# Patient Record
Sex: FEMALE | Race: WHITE | NOT HISPANIC OR LATINO | Employment: FULL TIME | URBAN - METROPOLITAN AREA
[De-identification: names, ages, dates, MRNs, and addresses within clinical notes are randomized per-mention and may not be internally consistent; named-entity substitution may affect disease eponyms.]

---

## 2017-01-06 ENCOUNTER — OFFICE VISIT (OUTPATIENT)
Dept: URGENT CARE | Facility: CLINIC | Age: 37
End: 2017-01-06
Payer: COMMERCIAL

## 2017-01-06 PROCEDURE — G0382 LEV 3 HOSP TYPE B ED VISIT: HCPCS

## 2017-07-14 ENCOUNTER — ALLSCRIPTS OFFICE VISIT (OUTPATIENT)
Dept: OTHER | Facility: OTHER | Age: 37
End: 2017-07-14

## 2017-07-24 ENCOUNTER — ALLSCRIPTS OFFICE VISIT (OUTPATIENT)
Dept: OTHER | Facility: OTHER | Age: 37
End: 2017-07-24

## 2017-07-24 PROCEDURE — 87624 HPV HI-RISK TYP POOLED RSLT: CPT | Performed by: OBSTETRICS & GYNECOLOGY

## 2017-07-24 PROCEDURE — G0145 SCR C/V CYTO,THINLAYER,RESCR: HCPCS | Performed by: OBSTETRICS & GYNECOLOGY

## 2017-07-25 ENCOUNTER — LAB REQUISITION (OUTPATIENT)
Dept: LAB | Facility: HOSPITAL | Age: 37
End: 2017-07-25
Payer: COMMERCIAL

## 2017-07-25 DIAGNOSIS — Z11.51 ENCOUNTER FOR SCREENING FOR HUMAN PAPILLOMAVIRUS (HPV): ICD-10-CM

## 2017-07-25 DIAGNOSIS — Z12.4 ENCOUNTER FOR SCREENING FOR MALIGNANT NEOPLASM OF CERVIX: ICD-10-CM

## 2017-07-27 LAB — HPV RRNA GENITAL QL NAA+PROBE: ABNORMAL

## 2017-08-01 LAB
LAB AP GYN PRIMARY INTERPRETATION: NORMAL
Lab: NORMAL
PATH INTERP SPEC-IMP: NORMAL

## 2017-08-23 ENCOUNTER — ALLSCRIPTS OFFICE VISIT (OUTPATIENT)
Dept: OTHER | Facility: OTHER | Age: 37
End: 2017-08-23

## 2017-08-23 PROCEDURE — 88305 TISSUE EXAM BY PATHOLOGIST: CPT | Performed by: OBSTETRICS & GYNECOLOGY

## 2017-08-23 PROCEDURE — 88341 IMHCHEM/IMCYTCHM EA ADD ANTB: CPT | Performed by: OBSTETRICS & GYNECOLOGY

## 2017-08-23 PROCEDURE — 88342 IMHCHEM/IMCYTCHM 1ST ANTB: CPT | Performed by: OBSTETRICS & GYNECOLOGY

## 2017-08-24 ENCOUNTER — LAB REQUISITION (OUTPATIENT)
Dept: LAB | Facility: HOSPITAL | Age: 37
End: 2017-08-24
Payer: COMMERCIAL

## 2017-08-24 DIAGNOSIS — R87.610 ATYPICAL SQUAMOUS CELLS OF UNDETERMINED SIGNIFICANCE ON CYTOLOGIC SMEAR OF CERVIX (ASC-US): ICD-10-CM

## 2017-09-01 ENCOUNTER — GENERIC CONVERSION - ENCOUNTER (OUTPATIENT)
Dept: OTHER | Facility: OTHER | Age: 37
End: 2017-09-01

## 2017-09-06 ENCOUNTER — LAB REQUISITION (OUTPATIENT)
Dept: LAB | Facility: HOSPITAL | Age: 37
End: 2017-09-06
Payer: COMMERCIAL

## 2017-09-06 ENCOUNTER — GENERIC CONVERSION - ENCOUNTER (OUTPATIENT)
Dept: OTHER | Facility: OTHER | Age: 37
End: 2017-09-06

## 2017-09-06 DIAGNOSIS — R87.610 ATYPICAL SQUAMOUS CELLS OF UNDETERMINED SIGNIFICANCE ON CYTOLOGIC SMEAR OF CERVIX (ASC-US): ICD-10-CM

## 2017-09-06 PROCEDURE — 88344 IMHCHEM/IMCYTCHM EA MLT ANTB: CPT | Performed by: OBSTETRICS & GYNECOLOGY

## 2017-09-06 PROCEDURE — 88307 TISSUE EXAM BY PATHOLOGIST: CPT | Performed by: OBSTETRICS & GYNECOLOGY

## 2017-09-20 ENCOUNTER — GENERIC CONVERSION - ENCOUNTER (OUTPATIENT)
Dept: OTHER | Facility: OTHER | Age: 37
End: 2017-09-20

## 2017-10-02 ENCOUNTER — GENERIC CONVERSION - ENCOUNTER (OUTPATIENT)
Dept: OTHER | Facility: OTHER | Age: 37
End: 2017-10-02

## 2017-10-02 DIAGNOSIS — N92.6 IRREGULAR MENSTRUATION: ICD-10-CM

## 2017-10-07 ENCOUNTER — LAB CONVERSION - ENCOUNTER (OUTPATIENT)
Dept: OTHER | Facility: OTHER | Age: 37
End: 2017-10-07

## 2017-10-07 LAB — TSH SERPL DL<=0.05 MIU/L-ACNC: 1.28 MIU/L

## 2017-10-23 ENCOUNTER — LAB REQUISITION (OUTPATIENT)
Dept: LAB | Facility: HOSPITAL | Age: 37
End: 2017-10-23
Payer: COMMERCIAL

## 2017-10-23 ENCOUNTER — GENERIC CONVERSION - ENCOUNTER (OUTPATIENT)
Dept: OTHER | Facility: OTHER | Age: 37
End: 2017-10-23

## 2017-10-23 DIAGNOSIS — N92.6 IRREGULAR MENSTRUATION: ICD-10-CM

## 2017-10-23 PROCEDURE — 88305 TISSUE EXAM BY PATHOLOGIST: CPT | Performed by: OBSTETRICS & GYNECOLOGY

## 2017-10-23 PROCEDURE — 88342 IMHCHEM/IMCYTCHM 1ST ANTB: CPT | Performed by: OBSTETRICS & GYNECOLOGY

## 2017-10-31 ENCOUNTER — ALLSCRIPTS OFFICE VISIT (OUTPATIENT)
Dept: OTHER | Facility: OTHER | Age: 37
End: 2017-10-31

## 2017-11-01 ENCOUNTER — GENERIC CONVERSION - ENCOUNTER (OUTPATIENT)
Dept: OTHER | Facility: OTHER | Age: 37
End: 2017-11-01

## 2017-11-01 NOTE — PROGRESS NOTES
Assessment  1  Contact dermatitis (692 9) (L25 9)   2  Paronychia of finger of right hand (681 02) (L03 011)   3  Acne, unspecified acne type (706 1) (L70 9)   4  Allergy to other foods (V15 05) (Z91 018)    Plan  Acne, unspecified acne type    · Clindamycin Phos-Benzoyl Perox 1-5 % External Gel; apply twice daily after  washing  Allergy to other foods    · Clark Kid  (Allergy/Immunology) Co-Management  *  Status: Active  Requested  for: 00VPA4024  Care Summary provided  : Yes  Contact dermatitis    · PredniSONE 10 MG Oral Tablet; TAKE 3 TABLETS FOR 3 DAYS, 2 TABLETS FOR 3  DAYS, 1 TABLET FOR 4 DAYS AND THEN STOP  Paronychia of finger of right hand    · Cephalexin 500 MG Oral Capsule; TAKE 1 CAPSULE 3 TIMES DAILY UNTIL  GONE    Discussion/Summary    Patient needs to keep a symptom and food diary and see allergist For her rash on her hands she will take the prednisone I explained the area by her right index finger is an infection likely from scrathinc She will take the antibitoic Her acne responded well in the past to the topical ane ointment and so we will continue with that  Chief Complaint  1  Rash  rash b/l hands ? allergies and also acne      History of Present Illness  HPI: Alfonso made appt for rash on her hands for last 4-5 days She has 4 raised red spots on her right hand and also 2 on her left On her right hand near the index finger the area is also red and swollen into the nail bed Patient is having a lot of itch She also relates that after eating at times she gets a cough and an itching in her throat Patient states the types of foods she is not sure about and she wants to see an allergist Lastly she wants to go back on [facility] sof hre acne She was on it in the past and did well   Rash:   Abran Flaherty presents with complaints of sudden onset of constant episodes of moderate bilateral hand rash  Episodes started about 5 days ago   Symptoms are improved by antihistamines and cold compresses, but not by barrier creams, proper skin care and topical medications, OTC  Symptoms are made worse by itch-scratch cycle  Symptoms are unchanged  Risk Factors: environmental exposure  Pertinent Medical History: allergic rhinitis, but not bronchial asthma, eczema, psychogenic factors, skin allergies, coagulopathy, collagen vascular disease and impaired immunity  Family History: allergic rhinitis and eczema, but not asthma, psoriasis, food allergies, hay fever and skin reactions  Associated symptoms include pain,-- pruritus-- and-- skin redness, but-- no skin blistering,-- no skin bumps,-- no cracking,-- no crusting,-- no draining,-- no skin dryness,-- no skin oiliness,-- no skin scaling,-- no skin swelling,-- no skin ulceration,-- no skin weeping,-- no excoriations,-- no fever,-- no fissuring,-- no nausea,-- no pustules,-- no purulent drainage-- and-- no serous discharge  Review of Systems    Constitutional: No fever, no chills, feels well, no tiredness, no recent weight gain or loss  ENT: as noted in HPI  Cardiovascular: no chest pain-- and-- no palpitations  Respiratory: no complaints of shortness of breath, no wheezing, no dyspnea on exertion, no orthopnea or PND  Integumentary: as noted in HPI  Active Problems  1  Cervical cancer screening (V76 2) (Z12 4)   2  LEN III (cervical intraepithelial neoplasia grade III) with severe dysplasia (233 1) (D06 9)   3  Genital herpes simplex (054 10) (A60 00)   4  Denied: History of self breast exam   5  Irregular bleeding (626 4) (N92 6)   6  Screening for HPV (human papillomavirus) (V73 81) (Z11 51)    Past Medical History  1  History of ASCUS with positive high risk HPV cervical (795 01,795 05)   (R87 610,R87 810)   2  History of Bacterial vaginosis (616 10,041 9) (N76 0,B96 89)   3  History of Cellulitis of foot, right (682 7) (L03 115)   4  History of  5 (V22 2) (Z33 1)   5  History of Hirsutism (704 1) (L68 0)   6   Denied: History of self breast exam   7  History of spontaneous  (V13 29) (Z87 59)   8  History of urinary tract infection (V13 02) (Z87 440)   9  History of Pap smear abnormality of cervix with ASCUS favoring benign (795 01)   (R87 610)   10  History of Pelvic and perineal pain (625 9) (R10 2)   11  History of Right wrist pain (719 43) (M25 531)   12  History of Vaginal candidiasis (112 1) (B37 3)  Active Problems And Past Medical History Reviewed: The active problems and past medical history were reviewed and updated today  Family History  Mother    1  Family history of diabetes mellitus (V18 0) (Z83 3)   2  Family history of hypertension (V17 49) (Z82 49)  Father    3  No pertinent family history  Paternal Grandmother    3  Family history of Malignant neoplasm of breast, unspecified laterality  Paternal Grandfather    5  Family history of Alzheimer disease  Paternal Uncle    10  Family history of Colon cancer    Social History   · Denied: Alcohol Use (History)   · Caffeine Use   ·    · Never A Smoker   · [address] ([address]   · Three children   · Tubal sterilization (V26 51) (Z98 51)   · Uses Safety Equipment - Seatbelts  The social history was reviewed and is unchanged  Surgical History  1  History of Ovarian Cystectomy Right   2  History of Tubal Ligation    Current Meds   1  ValACYclovir HCl - 500 MG Oral Tablet; Take b i d  X 3 days with outbreaks; Therapy: 96CMH9344 to (Evaluate:48Uld9201)  Requested for: 93ZTM2215; Last   Rx:46Cfd6709 Ordered    The medication list was reviewed and updated today  Allergies  1  No Known Drug Allergies  2  No Known Environmental Allergies   3  No Known Food Allergies    Vitals   Recorded: 21NDX4055 15:46XM   Systolic 294   Diastolic 78   Height 5 ft 4 in   Weight 160 lb    BMI Calculated 27 46   BSA Calculated 1 78     Physical Exam    Constitutional   General appearance: No acute distress, well appearing and well nourished  Eyes   Conjunctiva and lids: No swelling, erythema or discharge  Ears, Nose, Mouth, and Throat   External inspection of ears and nose: Normal     Otoscopic examination: Tympanic membranes translucent with normal light reflex  Canals patent without erythema  Nasal mucosa, septum, and turbinates: Normal without edema or erythema  Oropharynx: Normal with no erythema, edema, exudate or lesions  Pulmonary   Respiratory effort: No increased work of breathing or signs of respiratory distress  Auscultation of lungs: Clear to auscultation  Cardiovascular   Auscultation of heart: Normal rate and rhythm, normal S1 and S2, without murmurs  Skin open and closed comedones on the face There are papules on the dorsal surface of both hands that brittani there is also right index paronychia noted  Neurologic   Cranial nerves: Cranial nerves 2-12 intact      Psychiatric   Orientation to person, place, and time: Normal     Mood and affect: Normal          Future Appointments    Date/Time Provider Specialty Site   11/28/2017 09:00 AM Zhen Orozco DO Family Medicine [id number]   12/18/2017 09:30 AM Migue Quiñonez [facility]     Signatures   Electronically signed by : Christa Underwood DO; Oct 31 2017  4:59PM EST                       (Author)

## 2017-12-01 ENCOUNTER — GENERIC CONVERSION - ENCOUNTER (OUTPATIENT)
Dept: FAMILY MEDICINE CLINIC | Facility: CLINIC | Age: 37
End: 2017-12-01

## 2017-12-04 ENCOUNTER — GENERIC CONVERSION - ENCOUNTER (OUTPATIENT)
Dept: OTHER | Facility: OTHER | Age: 37
End: 2017-12-04

## 2017-12-19 ENCOUNTER — ALLSCRIPTS OFFICE VISIT (OUTPATIENT)
Dept: OTHER | Facility: OTHER | Age: 37
End: 2017-12-19

## 2017-12-19 ENCOUNTER — GENERIC CONVERSION - ENCOUNTER (OUTPATIENT)
Dept: OTHER | Facility: OTHER | Age: 37
End: 2017-12-19

## 2017-12-20 NOTE — PROCEDURES
Active Problems  1  Acne, unspecified acne type (706 1) (L70 9)   2  Cervical cancer screening (V76 2) (Z12 4)   3  LEN III (cervical intraepithelial neoplasia grade III) with severe dysplasia (233 1) (D06 9)   4  Genital herpes simplex (054 10) (A60 00)   5  GERD without esophagitis (530 81) (K21 9)   6  Denied: History of self breast exam   7  Infestation by bed bug (134 8) (B88 8)   8  Irregular bleeding (626 4) (N92 6)   9  Screening for HPV (human papillomavirus) (V73 81) (Z11 51)    Current Meds  1  Benzoyl Peroxide-Erythromycin 5-3 % External Gel; APPLY  AND RUB  IN A THIN FILM TO AFFECTED AREAS TWICE DAILY  (AM AND PM); Therapy: 22REU9644 to (Last Rx:01Nov2017)  Requested for: 99NZU8593 Ordered  2  Ibuprofen 600 MG Oral Tablet; Therapy: 14MAY9408 to Recorded   3  Omeprazole 40 MG Oral Capsule Delayed Release; Therapy: 29TUO1470 to Recorded   4  Oxycodone-Acetaminophen 5-325 MG Oral Tablet; Therapy: 74TGE4136 to Recorded   5  Triamcinolone Acetonide 0 1 % External Cream; Therapy: 90EQZ9123 to Recorded    Allergies  1  No Known Drug Allergies  2  No Known Environmental Allergies   3  No Known Food Allergies    Results/Data  Transvaginal Ultrasound:  Procedure: Transvaginal Pelvic Sonogram -- The study was done today in the office  Indication: Ovarian Cyst   Procedure Note:  Patient placed in dorsal lithotomy position with legs in stirrups  Ultrasound probe was covered wtih a condom, lubricated with water soluable gel  The ultrasound study was then performed  Findings:  Uterus:  7 8 x 5 4 x 6 8cm  Ovaries:  RT 2 6 x 1 3 x 1 4cm LT 3 4 x 1 7 x 2 2cm  Endometrium:  7 8mm  Impression:  Multi-position uterus demonstrates fluid within the endometrial canal, otherwise uterus and bilateral ovaries appear within normal limits  No free fluid  Patient Status: the patient tolerated the procedure well  Complications:  there were no complications        Signatures   Electronically signed by : Sita Bhatt, ; Dec 19 2017 8:44AM EST                       (Author)    Electronically signed by :  Beth Montague MD; Dec 19 2017  9:07AM EST

## 2017-12-20 NOTE — PROGRESS NOTES
Assessment  1  Herpes simplex virus (HSV) infection (054 9) (B00 9)   2  Simple ovarian cyst (620 2) (N86 774)    Plan  Herpes simplex virus (HSV) infection    · ValACYclovir HCl - 500 MG Oral Tablet; TAKE 1 TABLET DAILY   Rx By: Rosita Titus; Dispense: 90 Days ; #:90 Tablet; Refill: 3;For: Herpes simplex virus (HSV) infection; CHELSEA = N; Sent To: [id number]   Discussion/Summary  Discussion Summary:   Patient advised to call us should she have any further pelvic pain or wishes to pursue an ablation procedure for her abnormal uterine bleeding  In the meantime she will restart daily valacyclovir treatment for herpes  History of Present Illness  HPI: Rj Fraire is seen today for follow-up ultrasound because of a cyst on the ovary noted 8 weeks ago  Today's ultrasound is within normal limits  The patient is currently having no problems  She is requesting daily valacyclovir treatment for herpes outbreaks  She has used this medication before without any problems  She did recently use a cephalosporin and noticed hives  We will note this under allergies  Review of Systems  Focused-Female:  Constitutional: No fever, no chills, feels well, no tiredness, no recent weight gain or loss  ENT: no ear ache, no loss of hearing, no nosebleeds or nasal discharge, no sore throat or hoarseness  Cardiovascular: no complaints of slow or fast heart rate, no chest pain, no palpitations, no leg claudication or lower extremity edema  Respiratory: no complaints of shortness of breath, no wheezing, no dyspnea on exertion, no orthopnea or PND  Breasts: no complaints of breast pain, breast lump or nipple discharge  Gastrointestinal: no complaints of abdominal pain, no constipation, no nausea or diarrhea, no vomiting, no bloody stools  Genitourinary: no complaints of dysuria, no incontinence, no pelvic pain, no dysmenorrhea, no vaginal discharge or abnormal vaginal bleeding    Musculoskeletal: no complaints of arthralgia, no myalgia, no joint swelling or stiffness, no limb pain or swelling  Integumentary: no complaints of skin rash or lesion, no itching or dry skin, no skin wounds  Neurological: no complaints of headache, no confusion, no numbness or tingling, no dizziness or fainting  Active Problems  1  Acne, unspecified acne type (706 1) (L70 9)   2  Cervical cancer screening (V76 2) (Z12 4)   3  LEN III (cervical intraepithelial neoplasia grade III) with severe dysplasia (233 1) (D06 9)   4  Genital herpes simplex (054 10) (A60 00)   5  GERD without esophagitis (530 81) (K21 9)   6  Denied: History of self breast exam   7  Infestation by bed bug (134 8) (B88 8)   8  Irregular bleeding (626 4) (N92 6)   9  Screening for HPV (human papillomavirus) (V73 81) (Z11 51)    Past Medical History  1  History of ASCUS with positive high risk HPV cervical (795 01,795 05) (R87 610,R87 810)   2  History of Bacterial vaginosis (616 10,041 9) (N76 0,B96 89)   3  History of Cellulitis of foot, right (682 7) (L03 115)   4  History of  5 (V22 2) (Z33 1)   5  History of Hirsutism (704 1) (L68 0)   6  History of allergy to other foods (V15 05) (Z91 018)   7  History of contact dermatitis (V13 3) (Z87 2)   8  Denied: History of self breast exam   9  History of spontaneous  (V13 29) (Z87 59)   10  History of urinary tract infection (V13 02) (Z87 440)   11  History of Pap smear abnormality of cervix with ASCUS favoring benign (795 01)  (R87 610)   12  History of Paronychia of finger of right hand (681 02) (L03 011)   13  History of Pelvic and perineal pain (625 9) (R10 2)   14  History of Right wrist pain (719 43) (M25 531)   15  History of Vaginal candidiasis (112 1) (B37 3)    Surgical History  1  History of Ovarian Cystectomy Right   2  History of Tubal Ligation    Family History  Mother    1  Family history of diabetes mellitus (V18 0) (Z83 3)   2  Family history of hypertension (V17 49) (Z82 49)  Father    3   No pertinent family history  Paternal Grandmother    4  Family history of Malignant neoplasm of breast, unspecified laterality  Paternal Grandfather    5  Family history of Alzheimer disease  Paternal Uncle    10  Family history of Colon cancer    Social History   · Denied: Alcohol Use (History)   · Caffeine Use   ·    · Never A Smoker   · [address] ([address]   · Three children   · Tubal sterilization (V26 51) (Z98 51)   · Uses Safety Equipment - Seatbelts    Current Meds   1  Benzoyl Peroxide-Erythromycin 5-3 % External Gel; APPLY  AND RUB  IN A THIN FILM TO AFFECTED AREAS TWICE DAILY  (AM AND PM); Therapy: 73VKQ8820 to (Last Rx:01Nov2017)  Requested for: 24SXJ1382 Ordered   2  Ibuprofen 600 MG Oral Tablet; Therapy: 51YVY5484 to Recorded   3  Omeprazole 40 MG Oral Capsule Delayed Release; Therapy: 73QGM2090 to Recorded   4  Oxycodone-Acetaminophen 5-325 MG Oral Tablet; Therapy: 10MYM6395 to Recorded   5  Triamcinolone Acetonide 0 1 % External Cream; Therapy: 15ORX1730 to Recorded    Allergies  1  Cephalosporins  2  No Known Environmental Allergies   3  No Known Food Allergies    Signatures   Electronically signed by :  Jacob Peoples MD; Dec 19 2017  8:58AM EST                       (Author)

## 2018-01-05 ENCOUNTER — GENERIC CONVERSION - ENCOUNTER (OUTPATIENT)
Dept: FAMILY MEDICINE CLINIC | Facility: CLINIC | Age: 38
End: 2018-01-05

## 2018-01-11 NOTE — MISCELLANEOUS
Message   Recorded as Task   Date: 09/01/2017 10:59 AM, Created By: Nataliya Benito   Task Name: Go to Result   Assigned To: Jus Higgins   Regarding Patient: Portia Kanner, Status: Active   Comment:    Nataliya Benito - 01 Sep 2017 10:59 AM     TASK CREATED  Patient will need to have a LEEP cone procedure because of severe dysplasia noted at the 12 o'clock position   Amanda Shipley - 01 Sep 2017 12:19 PM     TASK EDITED  pt will schedule appt           Active Problems    1  Acne (706 1) (L70 9)   2  ASCUS with positive high risk HPV cervical (795 01,795 05) (R87 610,R87 810)   3  Cellulitis of foot, right (682 7) (L03 115)   4  Cervical cancer screening (V76 2) (Z12 4)   5  Genital herpes simplex (054 10) (A60 00)   6  Hirsutism (704 1) (L68 0)   7  Denied: History of self breast exam   8  Pap smear abnormality of cervix with ASCUS favoring benign (795 01) (R87 610)   9  Screening for HPV (human papillomavirus) (V73 81) (Z11 51)    Current Meds   1  ValACYclovir HCl - 500 MG Oral Tablet; Take b i d  X 3 days with outbreaks; Therapy: 64TTP8736 to (Evaluate:99Sjs8251)  Requested for: 76JCL4115; Last   Rx:80Raq0569 Ordered    Allergies    1  No Known Drug Allergies    2  No Known Environmental Allergies   3   No Known Food Allergies    Signatures   Electronically signed by : Shilo Patel, ; Sep  1 2017 12:19PM EST                       (Author)

## 2018-01-12 VITALS
DIASTOLIC BLOOD PRESSURE: 68 MMHG | TEMPERATURE: 98 F | BODY MASS INDEX: 27.02 KG/M2 | WEIGHT: 158.25 LBS | SYSTOLIC BLOOD PRESSURE: 110 MMHG | HEIGHT: 64 IN

## 2018-01-12 VITALS
DIASTOLIC BLOOD PRESSURE: 70 MMHG | WEIGHT: 159.5 LBS | SYSTOLIC BLOOD PRESSURE: 118 MMHG | BODY MASS INDEX: 27.23 KG/M2 | HEIGHT: 64 IN

## 2018-01-12 NOTE — MISCELLANEOUS
Message   Recorded as Task   Date: 09/20/2017 11:09 AM, Created By: Earnest Rolon   Task Name: Go to Result   Assigned To: Lester Alvarado   Regarding Patient: Tish Moon, Status: Active   Comment:    XiomaraHermes - 20 Sep 2017 11:09 AM     TASK CREATED  Pt has mild dysplasia with negative margins  Will only need yearly followup PAP smears  Amanda Shipley - 20 Sep 2017 11:44 AM     TASK EDITED  pt informed        Active Problems    1  Acne (706 1) (L70 9)   2  ASCUS with positive high risk HPV cervical (795 01,795 05) (R87 610,R87 810)   3  Cellulitis of foot, right (682 7) (L03 115)   4  Cervical cancer screening (V76 2) (Z12 4)   5  LEN III (cervical intraepithelial neoplasia grade III) with severe dysplasia (233 1) (D06 9)   6  Genital herpes simplex (054 10) (A60 00)   7  Hirsutism (704 1) (L68 0)   8  Denied: History of self breast exam   9  Pap smear abnormality of cervix with ASCUS favoring benign (795 01) (R87 610)   10  Screening for HPV (human papillomavirus) (V73 81) (Z11 51)    Current Meds   1  ValACYclovir HCl - 500 MG Oral Tablet; Take b i d  X 3 days with outbreaks; Therapy: 32TNA7521 to (Evaluate:64Fyh8873)  Requested for: 87HZJ9770; Last   Rx:41Fqs7689 Ordered    Allergies    1  No Known Drug Allergies    2  No Known Environmental Allergies   3   No Known Food Allergies    Signatures   Electronically signed by : Renee Zuluaga, ; Sep 20 2017 11:44AM EST                       (Author)

## 2018-01-13 VITALS
HEIGHT: 64 IN | WEIGHT: 160 LBS | DIASTOLIC BLOOD PRESSURE: 78 MMHG | BODY MASS INDEX: 27.31 KG/M2 | SYSTOLIC BLOOD PRESSURE: 118 MMHG

## 2018-01-14 VITALS
WEIGHT: 158 LBS | BODY MASS INDEX: 26.98 KG/M2 | DIASTOLIC BLOOD PRESSURE: 56 MMHG | SYSTOLIC BLOOD PRESSURE: 116 MMHG | HEIGHT: 64 IN

## 2018-01-17 NOTE — MISCELLANEOUS
Message   Recorded as Task   Date: 10/31/2017 03:15 PM, Created By: Marlon Manzanares   Task Name: Go to Result   Assigned To: Hermes Solano   Regarding Patient: Jennifer Burdick, Status: Active   Comment:    Hermes Solano - 31 Oct 2017 3:15 PM     TASK CREATED  Please till Irma Najera her biopsy results are normal  Also, does she want to further discuss, or schedule, an ablation procedure? Amanda Shipley - 01 Nov 2017 8:55 AM     TASK REPLIED TO: Previously Assigned To Lawrence Mishra  pt is not interested at this time    she is having an insurance issue    she knows that if things change, she can revisit the thought           Active Problems    1  Acne, unspecified acne type (706 1) (L70 9)   2  Allergy to other foods (V15 05) (Z91 018)   3  Cervical cancer screening (V76 2) (Z12 4)   4  LEN III (cervical intraepithelial neoplasia grade III) with severe dysplasia (233 1) (D06 9)   5  Contact dermatitis (692 9) (L25 9)   6  Genital herpes simplex (054 10) (A60 00)   7  Denied: History of self breast exam   8  Irregular bleeding (626 4) (N92 6)   9  Paronychia of finger of right hand (681 02) (L03 011)   10  Screening for HPV (human papillomavirus) (V73 81) (Z11 51)    Current Meds   1  Cephalexin 500 MG Oral Capsule; TAKE 1 CAPSULE 3 TIMES DAILY UNTIL GONE;   Therapy: 34VBB8355 to (Joyce Graham)  Requested for: 31Oct2017; Last   Rx:31Oct2017 Ordered   2  Clindamycin Phos-Benzoyl Perox 1-5 % External Gel; apply twice daily after washing; Therapy: 19BUO6099 to (Last Rx:31Oct2017)  Requested for: 31Oct2017 Ordered   3  PredniSONE 10 MG Oral Tablet; TAKE 3 TABLETS FOR 3 DAYS, 2 TABLETS FOR 3   DAYS, 1 TABLET FOR 4 DAYS AND THEN STOP; Therapy: 08IDK2270 to (Last Rx:31Oct2017)  Requested for: 31Oct2017 Ordered   4  ValACYclovir HCl - 500 MG Oral Tablet; Take b i d  X 3 days with outbreaks; Therapy: 45ASH3086 to (Evaluate:33Vnc9815)  Requested for: 06SGT9023; Last   Rx:84Pug7712 Ordered    Allergies    1   No Known Drug Allergies    2  No Known Environmental Allergies   3   No Known Food Allergies    Signatures   Electronically signed by : Marysol Carlson, ; Nov 1 2017  8:56AM EST                       (Author)

## 2018-01-22 VITALS — HEIGHT: 64 IN | BODY MASS INDEX: 26.8 KG/M2 | WEIGHT: 157 LBS

## 2018-01-22 VITALS
WEIGHT: 159.13 LBS | BODY MASS INDEX: 27.17 KG/M2 | HEIGHT: 64 IN | DIASTOLIC BLOOD PRESSURE: 66 MMHG | SYSTOLIC BLOOD PRESSURE: 116 MMHG

## 2018-01-22 VITALS
HEIGHT: 64 IN | DIASTOLIC BLOOD PRESSURE: 64 MMHG | SYSTOLIC BLOOD PRESSURE: 116 MMHG | BODY MASS INDEX: 26.89 KG/M2 | WEIGHT: 157.5 LBS

## 2018-01-24 VITALS
BODY MASS INDEX: 26.98 KG/M2 | HEIGHT: 64 IN | WEIGHT: 158 LBS | SYSTOLIC BLOOD PRESSURE: 120 MMHG | DIASTOLIC BLOOD PRESSURE: 64 MMHG

## 2018-01-24 VITALS
WEIGHT: 158 LBS | HEIGHT: 64 IN | SYSTOLIC BLOOD PRESSURE: 118 MMHG | BODY MASS INDEX: 26.98 KG/M2 | DIASTOLIC BLOOD PRESSURE: 78 MMHG

## 2018-02-01 ENCOUNTER — HOSPITAL ENCOUNTER (EMERGENCY)
Facility: HOSPITAL | Age: 38
Discharge: HOME | End: 2018-02-01
Payer: COMMERCIAL

## 2018-02-01 VITALS
DIASTOLIC BLOOD PRESSURE: 63 MMHG | OXYGEN SATURATION: 100 % | HEART RATE: 76 BPM | BODY MASS INDEX: 27.46 KG/M2 | SYSTOLIC BLOOD PRESSURE: 122 MMHG | TEMPERATURE: 98 F | WEIGHT: 160 LBS | RESPIRATION RATE: 18 BRPM
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
## 2018-02-01 DIAGNOSIS — M54.9 MID BACK PAIN ON RIGHT SIDE: ICD-10-CM

## 2018-02-01 DIAGNOSIS — M54.2 NECK PAIN ON RIGHT SIDE: Primary | ICD-10-CM

## 2018-02-01 PROCEDURE — 99283 EMERGENCY DEPT VISIT LOW MDM: CPT

## 2018-02-01 RX ORDER — NAPROXEN 500 MG/1
500 TABLET ORAL 2 TIMES DAILY WITH MEALS
Qty: 10 TABLET | Refills: 0 | Status: ON HOLD | OUTPATIENT
Start: 2018-02-01 | End: 2019-01-22 | Stop reason: ALTCHOICE

## 2018-02-01 RX ORDER — LIDOCAINE 50 MG/G
1 PATCH TOPICAL ONCE
Status: DISCONTINUED | OUTPATIENT
Start: 2018-02-01 | End: 2018-02-01 | Stop reason: HOSPADM

## 2018-02-01 RX ORDER — METHOCARBAMOL 500 MG/1
500 TABLET, FILM COATED ORAL
Qty: 20 TABLET | Refills: 0 | Status: ON HOLD | OUTPATIENT
Start: 2018-02-01 | End: 2019-01-22 | Stop reason: ALTCHOICE

## 2018-02-01 RX ADMIN — LIDOCAINE 1 PATCH: 50 PATCH CUTANEOUS at 18:11
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
## 2018-02-01 NOTE — ED NOTES
Pt tearful during triage  Multiple stressors at home  Hx anxiety        Gillian Hunter, TANI  02/01/18 8331

## 2018-07-23 DIAGNOSIS — L70.0 ACNE VULGARIS: Primary | ICD-10-CM

## 2018-07-24 RX ORDER — ERYTHROMYCIN AND BENZOYL PEROXIDE 30; 50 MG/G; MG/G
GEL TOPICAL
Qty: 23.3 G | Refills: 3 | Status: SHIPPED | OUTPATIENT
Start: 2018-07-24 | End: 2018-10-08

## 2018-10-08 RX ORDER — VALACYCLOVIR HYDROCHLORIDE 500 MG/1
500 TABLET, FILM COATED ORAL DAILY
Refills: 3 | COMMUNITY
Start: 2018-08-31 | End: 2019-01-28 | Stop reason: SDUPTHER

## 2018-10-08 RX ORDER — OMEPRAZOLE 40 MG/1
CAPSULE, DELAYED RELEASE ORAL
COMMUNITY
Start: 2017-12-01 | End: 2018-10-10

## 2018-10-08 RX ORDER — ERYTHROMYCIN AND BENZOYL PEROXIDE 30; 50 MG/G; MG/G
GEL TOPICAL 2 TIMES DAILY
COMMUNITY
Start: 2017-11-01 | End: 2021-06-14

## 2018-10-08 RX ORDER — OXYCODONE HYDROCHLORIDE AND ACETAMINOPHEN 5; 325 MG/1; MG/1
TABLET ORAL
COMMUNITY
Start: 2017-11-17 | End: 2018-10-10

## 2018-10-08 RX ORDER — IBUPROFEN 600 MG/1
TABLET ORAL
COMMUNITY
Start: 2017-11-30 | End: 2018-10-10

## 2018-10-08 RX ORDER — FAMOTIDINE 40 MG/1
TABLET, FILM COATED ORAL
Refills: 5 | COMMUNITY
Start: 2018-09-10 | End: 2021-10-15

## 2018-10-08 RX ORDER — TRIAMCINOLONE ACETONIDE 1 MG/G
CREAM TOPICAL
COMMUNITY
Start: 2017-12-01 | End: 2019-01-07 | Stop reason: ALTCHOICE

## 2018-10-08 RX ORDER — VALACYCLOVIR HYDROCHLORIDE 500 MG/1
1 TABLET, FILM COATED ORAL DAILY
COMMUNITY
Start: 2017-12-19 | End: 2018-10-08

## 2018-10-10 ENCOUNTER — ANNUAL EXAM (OUTPATIENT)
Dept: OBGYN CLINIC | Facility: CLINIC | Age: 38
End: 2018-10-10
Payer: COMMERCIAL

## 2018-10-10 VITALS
SYSTOLIC BLOOD PRESSURE: 118 MMHG | DIASTOLIC BLOOD PRESSURE: 72 MMHG | HEIGHT: 64 IN | WEIGHT: 148.6 LBS | BODY MASS INDEX: 25.37 KG/M2

## 2018-10-10 DIAGNOSIS — Z12.4 SCREENING FOR CERVICAL CANCER: ICD-10-CM

## 2018-10-10 DIAGNOSIS — Z11.51 SCREENING FOR HUMAN PAPILLOMAVIRUS (HPV): Primary | ICD-10-CM

## 2018-10-10 PROBLEM — N92.6 IRREGULAR BLEEDING: Status: ACTIVE | Noted: 2017-10-02

## 2018-10-10 PROBLEM — D06.9 CIN III (CERVICAL INTRAEPITHELIAL NEOPLASIA GRADE III) WITH SEVERE DYSPLASIA: Status: ACTIVE | Noted: 2017-09-06

## 2018-10-10 PROCEDURE — G0145 SCR C/V CYTO,THINLAYER,RESCR: HCPCS | Performed by: OBSTETRICS & GYNECOLOGY

## 2018-10-10 PROCEDURE — 99395 PREV VISIT EST AGE 18-39: CPT | Performed by: OBSTETRICS & GYNECOLOGY

## 2018-10-10 PROCEDURE — 87624 HPV HI-RISK TYP POOLED RSLT: CPT | Performed by: OBSTETRICS & GYNECOLOGY

## 2018-10-10 NOTE — PROGRESS NOTES
CC:  Annual exam    HPI: Olya Higgins presents for routine annual visit  She is doing well with the exception of having very heavy, sometimes prolonged, irregular menstrual cycles  She did have an evaluation last year with normal endometrial sampling and ultrasound results  At the time last year we did discuss the ablation procedure  The patient and I again briefly discussed some of her options  Past Medical History:  Past Medical History:   Diagnosis Date    Abnormal Pap smear of cervix     Cancer (HCC)     pre-cancerous, cervical     GERD (gastroesophageal reflux disease)     Herpes     HPV (human papilloma virus) infection     Psychiatric disorder     anxiety       Past Surgical History:  Past Surgical History:   Procedure Laterality Date    OVARIAN CYST REMOVAL      TUBAL LIGATION         Past OB/Gyn History:  Menstrual cycles as discussed under HPI  Denies any history of sexually transmitted infection  No history of abnormal pap smears  Her last pap smear was 2017 with the finding of LEN 3 eventually found to have LEN 1 on cone biopsy  ALLERGIES:   Allergies   Allergen Reactions    Cephalosporins Hives       MEDS:   Current Outpatient Prescriptions:     benzoyl peroxide-erythromycin (BENZAMYCIN) gel    famotidine (PEPCID) 40 MG tablet    valACYclovir (VALTREX) 500 mg tablet    methocarbamol (ROBAXIN) 500 mg tablet    naproxen (NAPROSYN) 500 mg tablet    triamcinolone (KENALOG) 0 1 % cream    Family History:  Family History   Problem Relation Age of Onset    Hyperlipidemia Mother     Hypertension Mother        Social History:  Social History     Social History    Marital status: Single     Spouse name: N/A    Number of children: N/A    Years of education: N/A     Occupational History    Not on file       Social History Main Topics    Smoking status: Never Smoker    Smokeless tobacco: Never Used    Alcohol use No    Drug use: No    Sexual activity: Not Currently     Other Topics Concern    Not on file     Social History Narrative    No narrative on file         Review of Systems:  Gen:   Denies fatigue, chills, nausea, vomiting, fever  Skin: No rashes or discolorations of any concern  RESP: Denies SOB, no cough  CV: Denies chest pain or palpitations  Breasts: Denies masses, pain, skin changes and nipple discharge  GI: Denies abdominal pain, heartburn, nausea, vomiting, changes in bowel habits  : Denies dysuria, frequency, CVA tenderness, incontinence and hematuria  Genitalia: Denies abnormal vaginal discharge, external lesions, rashes, pelvic pain, pressure, but positive for abnormal bleeding  Rectal:  Denies pain, bleeding, hemorrhoids,    Physical Exam:  /72 (BP Location: Left arm, Patient Position: Sitting, Cuff Size: Standard)   Ht 5' 3 5" (1 613 m)   Wt 67 4 kg (148 lb 9 6 oz)   LMP 08/27/2018 (Exact Date)   Breastfeeding? No   BMI 25 91 kg/m²    Gen: The patient was alert and oriented x3, pleasant well-appearing female in no acute distress  Neck:  Unremarkable, no anterior or posterior lymphadenopathy, no thyromegaly  Breasts: Symmetric  No dominant, discrete, fixed  or suspicious masses are noted  No skin or nipple changes  No palpable axillary nodes  Abd:  Soft, nontender, nondistended, no masses or organomegaly  Back:  No CVA tenderness, no tenderness to palpation along spine  Pelvic  Normal appearing external female genitalia, no visible lesions, no rashes  Vagina is free of discharge, normal vaginal epithelium, no abnormal  lesions, no evidence of prolapse anteriorly or posteriorly  Normal appearing cervix, mobile and nontender  A thin prep pap smear was obtained  Uterus is normal size, mobile and, nontender  No palpable adnexal masses or tenderness  No anoperineal lesions  Skin:  No concerning lesions  Extremeties: No edema      Assessment & Plan:   1  Routine annual exam      RTO one year orPRN      2    History of LEN 3, Pap smear with viral markers obtained this year  3   Menometrorrhagia, encouraged patient to consider an ablation procedure

## 2018-10-15 LAB
HPV HR 12 DNA CVX QL NAA+PROBE: NEGATIVE
HPV16 DNA CVX QL NAA+PROBE: NEGATIVE
HPV18 DNA CVX QL NAA+PROBE: NEGATIVE
LAB AP GYN PRIMARY INTERPRETATION: NORMAL
Lab: NORMAL

## 2018-11-15 ENCOUNTER — OFFICE VISIT (OUTPATIENT)
Dept: FAMILY MEDICINE CLINIC | Facility: CLINIC | Age: 38
End: 2018-11-15
Payer: COMMERCIAL

## 2018-11-15 VITALS
SYSTOLIC BLOOD PRESSURE: 118 MMHG | WEIGHT: 145 LBS | DIASTOLIC BLOOD PRESSURE: 78 MMHG | HEIGHT: 64 IN | BODY MASS INDEX: 24.75 KG/M2

## 2018-11-15 DIAGNOSIS — J35.8 TONSIL STONE: ICD-10-CM

## 2018-11-15 DIAGNOSIS — L70.0 ACNE VULGARIS: ICD-10-CM

## 2018-11-15 DIAGNOSIS — K59.04 CHRONIC IDIOPATHIC CONSTIPATION: Primary | ICD-10-CM

## 2018-11-15 DIAGNOSIS — K21.9 GASTROESOPHAGEAL REFLUX DISEASE WITHOUT ESOPHAGITIS: ICD-10-CM

## 2018-11-15 DIAGNOSIS — K64.4 EXTERNAL HEMORRHOIDS: ICD-10-CM

## 2018-11-15 PROBLEM — A60.04 HERPES SIMPLEX VULVOVAGINITIS: Status: ACTIVE | Noted: 2018-11-15

## 2018-11-15 PROCEDURE — 3008F BODY MASS INDEX DOCD: CPT | Performed by: FAMILY MEDICINE

## 2018-11-15 PROCEDURE — 1036F TOBACCO NON-USER: CPT | Performed by: FAMILY MEDICINE

## 2018-11-15 PROCEDURE — 99214 OFFICE O/P EST MOD 30 MIN: CPT | Performed by: FAMILY MEDICINE

## 2018-11-15 RX ORDER — POLYETHYLENE GLYCOL 3350 17 G/17G
POWDER, FOR SOLUTION ORAL
Qty: 500 G | Refills: 1 | Status: SHIPPED | OUTPATIENT
Start: 2018-11-15

## 2018-11-15 RX ORDER — DOCUSATE SODIUM 100 MG/1
100 CAPSULE, LIQUID FILLED ORAL 2 TIMES DAILY
Qty: 30 CAPSULE | Refills: 3 | Status: SHIPPED | OUTPATIENT
Start: 2018-11-15 | End: 2019-03-05

## 2018-11-15 NOTE — PROGRESS NOTES
Assessment/Plan:    Gastroesophageal reflux disease without esophagitis  Continue with the famotidine Patient will continue to watch diet    Acne vulgaris  Will refer to dermatology and continue current care    Chronic idiopathic constipation  Patient to use the colace and also the miralax Patient will see dr Vicky Lopez    External hemorrhoids  Refer dr Robert jauregui  Refer to ent       Diagnoses and all orders for this visit:    Chronic idiopathic constipation  -     docusate sodium (COLACE) 100 mg capsule; Take 1 capsule (100 mg total) by mouth 2 (two) times a day  -     Ambulatory referral to General Surgery; Future  -     polyethylene glycol (GLYCOLAX) powder; 2 tablespoons in 8 oz water daily    Gastroesophageal reflux disease without esophagitis    Acne vulgaris  -     Ambulatory referral to Dermatology; Future    External hemorrhoids  -     Ambulatory referral to General Surgery; Future    Tonsil stone  -     Ambulatory Referral to Otolaryngology; Future          Subjective:   Chief Complaint   Patient presents with    Constipation    Rash     face           Patient ID: Radha Munoz is a 41 y o  female      Patient is here for issues with constipation, tonsillar stones, acne and hemorrhoids Patient notes she has constipation all the time She will have BM's once or twice per week Patient has had bulging of hemorrhoids and having bleeding  Patient is not taking anything for this Patient was referred to Dr Rohini Arellano but did not get there patient is having tonsillar stones She notes there are large stones and issues  with the tonsils Patient has not seen ENT yet Patient acne is still an issue She is taking benzamycin and is still having greasy skin and oily areas patient is interested in seeing dermatology         The following portions of the patient's history were reviewed and updated as appropriate: allergies, current medications, past social history and problem list     Review of Systems Constitutional: Negative for fatigue, fever and unexpected weight change  HENT: Negative for congestion, sinus pain and trouble swallowing  Eyes: Negative for discharge and visual disturbance  Respiratory: Negative for cough, chest tightness, shortness of breath and wheezing  Cardiovascular: Negative for chest pain, palpitations and leg swelling  Gastrointestinal: Positive for anal bleeding and constipation  Negative for abdominal pain, blood in stool, diarrhea, nausea and vomiting  Hemorrhoids   Genitourinary: Negative for difficulty urinating, dysuria, frequency and hematuria  Musculoskeletal: Negative for arthralgias, gait problem and joint swelling  Skin: Negative for rash and wound  Allergic/Immunologic: Negative for environmental allergies and food allergies  Neurological: Negative for dizziness, syncope, weakness, numbness and headaches  Hematological: Negative for adenopathy  Does not bruise/bleed easily  Psychiatric/Behavioral: Negative for confusion, decreased concentration and sleep disturbance  The patient is not nervous/anxious  Objective:      /78   Ht 5' 3 5" (1 613 m)   Wt 65 8 kg (145 lb)   BMI 25 28 kg/m²          Physical Exam   Constitutional: She is oriented to person, place, and time  She appears well-developed and well-nourished  HENT:   Head: Normocephalic and atraumatic  Right Ear: External ear normal    Left Ear: External ear normal    Nose: Nose normal    Mouth/Throat: Oropharynx is clear and moist    Eyes: Pupils are equal, round, and reactive to light  Conjunctivae and EOM are normal  Right eye exhibits no discharge  Left eye exhibits no discharge  Neck: Normal range of motion  Neck supple  No JVD present  No tracheal deviation present  No thyromegaly present  Cardiovascular: Normal rate, normal heart sounds and intact distal pulses  Pulmonary/Chest: Effort normal and breath sounds normal  She has no wheezes  She has no rales  Abdominal: Soft  Bowel sounds are normal  She exhibits no distension and no mass  There is no tenderness  There is no rebound  Musculoskeletal: Normal range of motion  Lymphadenopathy:     She has no cervical adenopathy  Neurological: She is alert and oriented to person, place, and time  She has normal reflexes  No cranial nerve deficit  Coordination normal    Skin: Skin is warm and dry  Rash noted  Open comedones with greasy skin   Psychiatric: She has a normal mood and affect  Her behavior is normal  Judgment and thought content normal    Nursing note and vitals reviewed

## 2018-11-15 NOTE — PATIENT INSTRUCTIONS
Constipation   AMBULATORY CARE:   Constipation  is when you have hard, dry bowel movements, or you go longer than usual between bowel movements  Constipation may be caused by a lack of water or high-fiber foods  Medicines used to treat pain or depression, or a lack of physical activity may also cause constipation  Common symptoms include the following:   · Trouble pushing out your bowel movement    · Pain or bleeding during your bowel movement    · A feeling that you did not finish having your bowel movement    · Nausea    · Bloating    · Headache  Seek immediate care for the following symptoms:   · Blood in your bowel movement    · A fever and abdominal pain with the constipation  Contact your healthcare provider if:   · Your constipation gets worse  · You start to vomit  · You have questions or concerns about your condition or care  Medicines:   · Medicine or a fiber supplement  may help make your bowel movement softer  A laxative may help relax and loosen your intestines to help you have a bowel movement  You may also be given medicine to increase fluid in your intestines  The fluid may help move bowel movements through your intestines  · Take your medicine as directed  Contact your healthcare provider if you think your medicine is not helping or if you have side effects  Tell him of her if you are allergic to any medicine  Keep a list of the medicines, vitamins, and herbs you take  Include the amounts, and when and why you take them  Bring the list or the pill bottles to follow-up visits  Carry your medicine list with you in case of an emergency  Manage or prevent constipation:   · Drink liquids as directed  You may need to drink extra liquids to help soften and move your bowels  Ask how much liquid to drink each day and which liquids are best for you  · Eat high-fiber foods  This may help decrease constipation by adding bulk to your bowel movements   High-fiber foods include fruit, vegetables, whole-grain breads and cereals, and beans  Your healthcare provider or dietitian can help you create a high-fiber meal plan  · Exercise regularly  Regular physical activity can help stimulate your intestines  Ask which exercises are best for you  · Schedule a time each day to have a bowel movement  This may help train your body to have regular bowel movements  Bend forward while you are on the toilet to help move the bowel movement out  Sit on the toilet for at least 10 minutes, even if you do not have a bowel movement  Follow up with your healthcare provider as directed:  Write down your questions so you remember to ask them during your visits  © 2017 [facility] Information is for End User's use only and may not be sold, redistributed or otherwise used for commercial purposes  All illustrations and images included in CareNotes® are the copyrighted property of [employer]  or Jared Braun  The above information is an  only  It is not intended as medical advice for individual conditions or treatments  Talk to your doctor, nurse or pharmacist before following any medical regimen to see if it is safe and effective for you

## 2019-01-07 ENCOUNTER — OFFICE VISIT (OUTPATIENT)
Dept: SURGERY | Facility: CLINIC | Age: 39
End: 2019-01-07
Payer: COMMERCIAL

## 2019-01-07 VITALS
TEMPERATURE: 98.2 F | HEART RATE: 73 BPM | WEIGHT: 139 LBS | HEIGHT: 64 IN | SYSTOLIC BLOOD PRESSURE: 110 MMHG | DIASTOLIC BLOOD PRESSURE: 78 MMHG | BODY MASS INDEX: 23.73 KG/M2

## 2019-01-07 DIAGNOSIS — K64.4 EXTERNAL HEMORRHOIDS: ICD-10-CM

## 2019-01-07 DIAGNOSIS — K59.04 CHRONIC IDIOPATHIC CONSTIPATION: Primary | ICD-10-CM

## 2019-01-07 PROCEDURE — 99243 OFF/OP CNSLTJ NEW/EST LOW 30: CPT | Performed by: SURGERY

## 2019-01-07 NOTE — LETTER
January 7, 2019     [facility]  [address]    Patient: Caroline Tabor   YOB: 1980   Date of Visit: 1/7/2019       Dear Dr Crystal Wisdom:    Thank you for referring Caroline Tabor to me for evaluation  Below are my notes for this consultation  If you have questions, please do not hesitate to call me  I look forward to following your patient along with you  Sincerely,        Rakesh Flaherty MD        CC: No Recipients  Rakesh Flaherty MD  1/7/2019  3:29 PM  Sign at close encounter  Assessment/Plan:   Caroline Tabor is a 41 y  o female who is here for a:  Diagnostic colonoscopy for bright red blood per rectum 3 to 4 times a month  Significant constipation moving her bowels anywhere from 3-7 days at a time which are hard  She was started on Colace and MiraLax a month ago by her primary care physician, but has not started these yet  She does not take anything else except occasional steroid cream for the hemorrhoids  Plan: Colonoscopy for: Blood in stools     Also recommend a combination shot glass of mineral oil and prune juice daily in the evening prior to bedtime  This will usually produce a bowel movement by the morning after several days of use  She can also have a Colace or MiraLax as prescribed by her primary care physician  However we recommended trying the mineral oil/prune juice combination  She can also add Metamucil and more fiber to her diet  Recommend more exercises well to improve her bowel habits  Previous Colonoscopy and  Location of polyps if any:   none        Preoperative Clearance: None        ______________________________________________________    HPI:  Caroline Tabor is a 41 y  o female who was referred for evaluation of    Diagnostic   Currently:     Symptoms include:  no abdominal pain, but does have a occasional bright red blood per rectum      Also has significant abnormal bowel habits with bowel movement about every 3 days  No weight loss  Has not taken anything for this  Occasionally uses a steroid cream for her hemorrhoids  Family history of colon cancer: None reported             Anticoagulation: none    LABS:      Lab Results   Component Value Date    WBC 6 30 03/28/2014    HGB 13 3 03/28/2014    HCT 40 3 03/28/2014    MCV 89 03/28/2014     03/28/2014     Lab Results   Component Value Date     03/28/2014    K 3 6 03/28/2014     03/28/2014    CO2 28 03/28/2014    ANIONGAP 4 03/28/2014    BUN 8 03/28/2014    CREATININE 0 66 03/28/2014    GLUCOSE 90 03/28/2014    CALCIUM 9 0 03/28/2014    AST 21 03/28/2014    ALT 26 03/28/2014    ALKPHOS 66 03/28/2014    PROT 7 6 03/28/2014    BILITOT 0 8 03/28/2014     No results found for: HGBA1C  Lab Results   Component Value Date    INR 1 09 03/28/2014    PROTIME 13 6 03/28/2014         No orders to display           ROS:  General ROS: negative for - chills, fatigue, fever or night sweats, weight loss  Respiratory ROS: no cough, shortness of breath, or wheezing  Cardiovascular ROS: no chest pain or dyspnea on exertion  Genito-Urinary ROS: no dysuria, trouble voiding, or hematuria  Musculoskeletal ROS: negative for - gait disturbance, joint pain or muscle pain  Neurological ROS: no TIA or stroke symptoms  GI ROS: see HPI  Skin ROS: no new rashes or lesions   Lymphatic ROS: no new adenopathy noted by pt  GYN ROS: see HPI, no new GYN history or bleeding noted  Psy ROS: no new mental or behavioral disturbances           Imaging: I personally reviewed the radiology studies, my impression is as follows:  None          Patient Active Problem List   Diagnosis    LEN III (cervical intraepithelial neoplasia grade III) with severe dysplasia    Irregular bleeding    Chronic idiopathic constipation    Gastroesophageal reflux disease without esophagitis    Acne vulgaris    Herpes simplex vulvovaginitis    External hemorrhoids    Tonsil stone         Allergies:  Cephalosporins      Current Outpatient Prescriptions:     benzoyl peroxide-erythromycin (BENZAMYCIN) gel, Apply topically Twice daily, Disp: , Rfl:     famotidine (PEPCID) 40 MG tablet, TAKE 1 TABLET PRIOR TO BREAKFAST AND PRIOR TO DINNER, Disp: , Rfl: 5    valACYclovir (VALTREX) 500 mg tablet, Take 500 mg by mouth daily, Disp: , Rfl: 3    docusate sodium (COLACE) 100 mg capsule, Take 1 capsule (100 mg total) by mouth 2 (two) times a day (Patient not taking: Reported on 1/7/2019 ), Disp: 30 capsule, Rfl: 3    methocarbamol (ROBAXIN) 500 mg tablet, Take 1 tablet (500 mg total) by mouth daily at bedtime as needed for muscle spasms for up to 5 days, Disp: 20 tablet, Rfl: 0    naproxen (NAPROSYN) 500 mg tablet, Take 1 tablet (500 mg total) by mouth 2 (two) times a day with meals for 5 days, Disp: 10 tablet, Rfl: 0    polyethylene glycol (GLYCOLAX) powder, 2 tablespoons in 8 oz water daily (Patient not taking: Reported on 1/7/2019 ), Disp: 500 g, Rfl: 1    Past Medical History:   Diagnosis Date    Abnormal Pap smear of cervix     ASCUS with positive high risk HPV cervical     Resolved: 10/23/2017    Cancer (HCC)     pre-cancerous, cervical     Cellulitis of right foot 2017    GERD (gastroesophageal reflux disease)     Herpes     Hirsutism     HPV (human papilloma virus) infection     Psychiatric disorder     anxiety    Urinary tract infection        Past Surgical History:   Procedure Laterality Date    OVARIAN CYST REMOVAL      TUBAL LIGATION         Family History   Problem Relation Age of Onset    Hyperlipidemia Mother     Hypertension Mother     Diabetes Mother     No Known Problems Father     Breast cancer Paternal Grandmother     Alzheimer's disease Paternal Grandfather     Colon cancer Paternal Uncle        Social History     Social History    Marital status: Single     Spouse name: N/A    Number of children: 3    Years of education: N/A     Social History Main Topics    Smoking status: Never Smoker    Smokeless tobacco: Never Used    Alcohol use No    Drug use: No    Sexual activity: Not Currently     Other Topics Concern    None     Social History Narrative    Caffeine Use    Uses Safety Equipment-seatbelts       Exam:   Vitals:    01/07/19 1510   BP: 110/78   Pulse: 73   Temp: 98 2 °F (36 8 °C)           ______________________________________________________    PHYSICAL EXAM  General Appearance:    Alert, cooperative, no distress,      Head:    Normocephalic without obvious abnormality   Eyes:    PERRL, conjunctiva/corneas clear, EOM's intact        Neck:   Supple, no adenopathy, no JVD   Back:     Symmetric, no spinal or CVA tenderness   Lungs:     Clear to auscultation bilaterally, no wheezing or rhonchi   Heart:    Regular rate and rhythm, S1 and S2 normal, no murmur   Abdomen:     Soft, nontender, nondistended  External rectal exam shows some hemorrhoidal skin tags of moderate size  Extremities:   Extremities normal  No clubbing, cyanosis or edema   Psych:   Normal Affect   Neurologic:   CNII-XII intact  Strength symmetric, speech intact                 Santos Arellano MD  Date: 1/7/2019 Time: 3:27 PM       This report has been generated by a voice recognition software system  Therefore, there may be syntax, spelling, and/or grammatical errors  Please call if you've any questions  This  encounter has been billed by a non certified Coder  Informed consent for procedure was personally discussed, reviewed, and signed by Dr Shahana Reeves  Discussion by Dr Shahana Reeves was carried out regarding risks, benefits, and alternatives with the patient  Risks include but are not limited to:  bleeding, infection, and delayed wound healing or an open wound, pulmonary embolus, leaks from bowel or bile ducts or other viscus, transfusions, death  Discussed in further detail the more common complications and their rates of occurrence     was used if necessary  Patient expressed understanding of the issues discussed and wished/consented to proceed  All questions were answered by Dr Debbi Mascorro  Discussion performed between patient and the provider signing below  Signature:   Yanelis Naylor MD  Date: 1/7/2019 Time: 3:27 PM                                                                                                                                        Informed consent for procedure was personally discussed, reviewed, and signed by Dr Debbi Mascorro  Discussion by Dr Debbi Mascorro was carried out regarding risks, benefits, and alternatives with the patient  Risks include but are not limited to:  bleeding, infection, and delayed wound healing or an open wound, pulmonary embolus, leaks from bowel or bile ducts or other viscus, transfusions, death  Discussed in further detail the more common complications and their rates of occurrence   was used if necessary  Patient expressed understanding of the issues discussed and wished/consented to proceed  All questions were answered by Dr Debbi Mascorro  Discussion performed between patient and the provider signing below  Signature:   Yanelis Naylor MD    Date: 1/7/2019 Time: 3:27 PM           Some portions of this record may have been generated with voice recognition software  There may be translation, syntax,  or grammatical errors  Occasional wrong word or "sound-a-like" substitutions may have occurred due to the inherent limitations of the voice recognition software  Read the chart carefully and recognize, using context, where substitutions may have occurred  If you have any questions, please contact the dictating provider for clarification or correction, as needed  This encounter has been coded by a non-certified coder

## 2019-01-07 NOTE — LETTER
January 7, 2019     Patient: Radha Munoz   YOB: 1980   Date of Visit: 1/7/2019       To Whom it May Concern:    Radha Munoz is under my professional care  She was seen in my office on 1/7/2019  She is schedule for a     procedure on Tuesday 1/22/2019  If you have any questions or concerns, please don't hesitate to call           Sincerely,          Terra Bcekett MD        CC: Radha Espinozao

## 2019-01-07 NOTE — PROGRESS NOTES
Assessment/Plan:   Olya Higgins is a 41 y  o female who is here for a:  Diagnostic colonoscopy for bright red blood per rectum 3 to 4 times a month  Significant constipation moving her bowels anywhere from 3-7 days at a time which are hard  She was started on Colace and MiraLax a month ago by her primary care physician, but has not started these yet  She does not take anything else except occasional steroid cream for the hemorrhoids  Plan: Colonoscopy for: Blood in stools     Also recommend a combination shot glass of mineral oil and prune juice daily in the evening prior to bedtime  This will usually produce a bowel movement by the morning after several days of use  She can also have a Colace or MiraLax as prescribed by her primary care physician  However we recommended trying the mineral oil/prune juice combination  She can also add Metamucil and more fiber to her diet  Recommend more exercises well to improve her bowel habits  Previous Colonoscopy and  Location of polyps if any:   none        Preoperative Clearance: None        ______________________________________________________    HPI:  Olya Higgins is a 41 y  o female who was referred for evaluation of    Diagnostic   Currently:     Symptoms include:  no abdominal pain, but does have a occasional bright red blood per rectum  Also has significant abnormal bowel habits with bowel movement about every 3 days  No weight loss  Has not taken anything for this  Occasionally uses a steroid cream for her hemorrhoids            Family history of colon cancer: None reported             Anticoagulation: none    LABS:      Lab Results   Component Value Date    WBC 6 30 03/28/2014    HGB 13 3 03/28/2014    HCT 40 3 03/28/2014    MCV 89 03/28/2014     03/28/2014     Lab Results   Component Value Date     03/28/2014    K 3 6 03/28/2014     03/28/2014    CO2 28 03/28/2014    ANIONGAP 4 03/28/2014    BUN 8 03/28/2014 CREATININE 0 66 03/28/2014    GLUCOSE 90 03/28/2014    CALCIUM 9 0 03/28/2014    AST 21 03/28/2014    ALT 26 03/28/2014    ALKPHOS 66 03/28/2014    PROT 7 6 03/28/2014    BILITOT 0 8 03/28/2014     No results found for: HGBA1C  Lab Results   Component Value Date    INR 1 09 03/28/2014    PROTIME 13 6 03/28/2014         No orders to display           ROS:  General ROS: negative for - chills, fatigue, fever or night sweats, weight loss  Respiratory ROS: no cough, shortness of breath, or wheezing  Cardiovascular ROS: no chest pain or dyspnea on exertion  Genito-Urinary ROS: no dysuria, trouble voiding, or hematuria  Musculoskeletal ROS: negative for - gait disturbance, joint pain or muscle pain  Neurological ROS: no TIA or stroke symptoms  GI ROS: see HPI  Skin ROS: no new rashes or lesions   Lymphatic ROS: no new adenopathy noted by pt  GYN ROS: see HPI, no new GYN history or bleeding noted  Psy ROS: no new mental or behavioral disturbances           Imaging: I personally reviewed the radiology studies, my impression is as follows:  None          Patient Active Problem List   Diagnosis    LEN III (cervical intraepithelial neoplasia grade III) with severe dysplasia    Irregular bleeding    Chronic idiopathic constipation    Gastroesophageal reflux disease without esophagitis    Acne vulgaris    Herpes simplex vulvovaginitis    External hemorrhoids    Tonsil stone         Allergies:  Cephalosporins      Current Outpatient Prescriptions:     benzoyl peroxide-erythromycin (BENZAMYCIN) gel, Apply topically Twice daily, Disp: , Rfl:     famotidine (PEPCID) 40 MG tablet, TAKE 1 TABLET PRIOR TO BREAKFAST AND PRIOR TO DINNER, Disp: , Rfl: 5    valACYclovir (VALTREX) 500 mg tablet, Take 500 mg by mouth daily, Disp: , Rfl: 3    docusate sodium (COLACE) 100 mg capsule, Take 1 capsule (100 mg total) by mouth 2 (two) times a day (Patient not taking: Reported on 1/7/2019 ), Disp: 30 capsule, Rfl: 3    methocarbamol (ROBAXIN) 500 mg tablet, Take 1 tablet (500 mg total) by mouth daily at bedtime as needed for muscle spasms for up to 5 days, Disp: 20 tablet, Rfl: 0    naproxen (NAPROSYN) 500 mg tablet, Take 1 tablet (500 mg total) by mouth 2 (two) times a day with meals for 5 days, Disp: 10 tablet, Rfl: 0    polyethylene glycol (GLYCOLAX) powder, 2 tablespoons in 8 oz water daily (Patient not taking: Reported on 1/7/2019 ), Disp: 500 g, Rfl: 1    Past Medical History:   Diagnosis Date    Abnormal Pap smear of cervix     ASCUS with positive high risk HPV cervical     Resolved: 10/23/2017    Cancer (HCC)     pre-cancerous, cervical     Cellulitis of right foot 2017    GERD (gastroesophageal reflux disease)     Herpes     Hirsutism     HPV (human papilloma virus) infection     Psychiatric disorder     anxiety    Urinary tract infection        Past Surgical History:   Procedure Laterality Date    OVARIAN CYST REMOVAL      TUBAL LIGATION         Family History   Problem Relation Age of Onset    Hyperlipidemia Mother     Hypertension Mother     Diabetes Mother     No Known Problems Father     Breast cancer Paternal Grandmother     Alzheimer's disease Paternal Grandfather     Colon cancer Paternal Uncle        Social History     Social History    Marital status: Single     Spouse name: N/A    Number of children: 3    Years of education: N/A     Social History Main Topics    Smoking status: Never Smoker    Smokeless tobacco: Never Used    Alcohol use No    Drug use: No    Sexual activity: Not Currently     Other Topics Concern    None     Social History Narrative    Caffeine Use    Uses Safety Equipment-seatbelts       Exam:   Vitals:    01/07/19 1510   BP: 110/78   Pulse: 73   Temp: 98 2 °F (36 8 °C)           ______________________________________________________    PHYSICAL EXAM  General Appearance:    Alert, cooperative, no distress,      Head:    Normocephalic without obvious abnormality   Eyes: PERRL, conjunctiva/corneas clear, EOM's intact        Neck:   Supple, no adenopathy, no JVD   Back:     Symmetric, no spinal or CVA tenderness   Lungs:     Clear to auscultation bilaterally, no wheezing or rhonchi   Heart:    Regular rate and rhythm, S1 and S2 normal, no murmur   Abdomen:     Soft, nontender, nondistended  External rectal exam shows some hemorrhoidal skin tags of moderate size  Extremities:   Extremities normal  No clubbing, cyanosis or edema   Psych:   Normal Affect   Neurologic:   CNII-XII intact  Strength symmetric, speech intact                 Caro Gutierrez MD  Date: 1/7/2019 Time: 3:27 PM       This report has been generated by a voice recognition software system  Therefore, there may be syntax, spelling, and/or grammatical errors  Please call if you've any questions  This  encounter has been billed by a non certified Coder  Informed consent for procedure was personally discussed, reviewed, and signed by Dr Goyo Carbajal  Discussion by Dr Goyo Carbajal was carried out regarding risks, benefits, and alternatives with the patient  Risks include but are not limited to:  bleeding, infection, and delayed wound healing or an open wound, pulmonary embolus, leaks from bowel or bile ducts or other viscus, transfusions, death  Discussed in further detail the more common complications and their rates of occurrence   was used if necessary  Patient expressed understanding of the issues discussed and wished/consented to proceed  All questions were answered by Dr Goyo Carbajal  Discussion performed between patient and the provider signing below  Signature:   Caro Gutierrez MD  Date: 1/7/2019 Time: 3:27 PM                                                                                                                                        Informed consent for procedure was personally discussed, reviewed, and signed by Dr Goyo Carbajal   Discussion by Dr Goyo Carbajal was carried out regarding risks, benefits, and alternatives with the patient  Risks include but are not limited to:  bleeding, infection, and delayed wound healing or an open wound, pulmonary embolus, leaks from bowel or bile ducts or other viscus, transfusions, death  Discussed in further detail the more common complications and their rates of occurrence   was used if necessary  Patient expressed understanding of the issues discussed and wished/consented to proceed  All questions were answered by Dr Nupur Shannon  Discussion performed between patient and the provider signing below  Signature:   Neeraj Jasso MD    Date: 1/7/2019 Time: 3:27 PM           Some portions of this record may have been generated with voice recognition software  There may be translation, syntax,  or grammatical errors  Occasional wrong word or "sound-a-like" substitutions may have occurred due to the inherent limitations of the voice recognition software  Read the chart carefully and recognize, using context, where substitutions may have occurred  If you have any questions, please contact the dictating provider for clarification or correction, as needed  This encounter has been coded by a non-certified coder

## 2019-01-09 PROBLEM — R19.5 OCCULT BLOOD IN STOOLS: Status: ACTIVE | Noted: 2019-01-09

## 2019-01-21 ENCOUNTER — ANESTHESIA EVENT (OUTPATIENT)
Dept: GASTROENTEROLOGY | Facility: HOSPITAL | Age: 39
End: 2019-01-21
Payer: COMMERCIAL

## 2019-01-22 ENCOUNTER — HOSPITAL ENCOUNTER (OUTPATIENT)
Facility: HOSPITAL | Age: 39
Setting detail: OUTPATIENT SURGERY
Discharge: HOME | End: 2019-01-22
Attending: SURGERY | Admitting: SURGERY
Payer: COMMERCIAL

## 2019-01-22 ENCOUNTER — ANESTHESIA (OUTPATIENT)
Dept: GASTROENTEROLOGY | Facility: HOSPITAL | Age: 39
End: 2019-01-22
Payer: COMMERCIAL

## 2019-01-22 VITALS
RESPIRATION RATE: 20 BRPM | SYSTOLIC BLOOD PRESSURE: 95 MMHG | DIASTOLIC BLOOD PRESSURE: 50 MMHG | HEIGHT: 64 IN | OXYGEN SATURATION: 100 % | TEMPERATURE: 98.9 F | WEIGHT: 139 LBS | BODY MASS INDEX: 23.73 KG/M2 | HEART RATE: 67 BPM

## 2019-01-22 DIAGNOSIS — R19.5 OCCULT BLOOD IN STOOLS: ICD-10-CM

## 2019-01-22 LAB — EXT PREGNANCY TEST URINE: NEGATIVE

## 2019-01-22 PROCEDURE — 88305 TISSUE EXAM BY PATHOLOGIST: CPT | Performed by: PATHOLOGY

## 2019-01-22 PROCEDURE — 81025 URINE PREGNANCY TEST: CPT | Performed by: ANESTHESIOLOGY

## 2019-01-22 PROCEDURE — 45380 COLONOSCOPY AND BIOPSY: CPT | Performed by: SURGERY

## 2019-01-22 RX ORDER — SODIUM CHLORIDE 9 MG/ML
125 INJECTION, SOLUTION INTRAVENOUS CONTINUOUS
Status: DISCONTINUED | OUTPATIENT
Start: 2019-01-22 | End: 2019-01-22 | Stop reason: HOSPADM

## 2019-01-22 RX ORDER — PROPOFOL 10 MG/ML
INJECTION, EMULSION INTRAVENOUS AS NEEDED
Status: DISCONTINUED | OUTPATIENT
Start: 2019-01-22 | End: 2019-01-22 | Stop reason: SURG

## 2019-01-22 RX ADMIN — PROPOFOL 20 MG: 10 INJECTION, EMULSION INTRAVENOUS at 10:40

## 2019-01-22 RX ADMIN — PROPOFOL 100 MG: 10 INJECTION, EMULSION INTRAVENOUS at 10:31

## 2019-01-22 RX ADMIN — PROPOFOL 30 MG: 10 INJECTION, EMULSION INTRAVENOUS at 10:34

## 2019-01-22 RX ADMIN — PROPOFOL 20 MG: 10 INJECTION, EMULSION INTRAVENOUS at 10:38

## 2019-01-22 RX ADMIN — PROPOFOL 20 MG: 10 INJECTION, EMULSION INTRAVENOUS at 10:35

## 2019-01-22 RX ADMIN — PROPOFOL 20 MG: 10 INJECTION, EMULSION INTRAVENOUS at 10:37

## 2019-01-22 RX ADMIN — PROPOFOL 30 MG: 10 INJECTION, EMULSION INTRAVENOUS at 10:36

## 2019-01-22 RX ADMIN — SODIUM CHLORIDE 125 ML/HR: 0.9 INJECTION, SOLUTION INTRAVENOUS at 09:23

## 2019-01-22 RX ADMIN — LIDOCAINE HYDROCHLORIDE 20 MG: 20 INJECTION, SOLUTION INTRAVENOUS at 10:31

## 2019-01-22 RX ADMIN — PROPOFOL 20 MG: 10 INJECTION, EMULSION INTRAVENOUS at 10:42

## 2019-01-22 RX ADMIN — PROPOFOL 10 MG: 10 INJECTION, EMULSION INTRAVENOUS at 10:43

## 2019-01-22 RX ADMIN — PROPOFOL 20 MG: 10 INJECTION, EMULSION INTRAVENOUS at 10:44

## 2019-01-22 NOTE — H&P (VIEW-ONLY)
Assessment/Plan:   Yong Adames is a 41 y  o female who is here for a:  Diagnostic colonoscopy for bright red blood per rectum 3 to 4 times a month  Significant constipation moving her bowels anywhere from 3-7 days at a time which are hard  She was started on Colace and MiraLax a month ago by her primary care physician, but has not started these yet  She does not take anything else except occasional steroid cream for the hemorrhoids  Plan: Colonoscopy for: Blood in stools     Also recommend a combination shot glass of mineral oil and prune juice daily in the evening prior to bedtime  This will usually produce a bowel movement by the morning after several days of use  She can also have a Colace or MiraLax as prescribed by her primary care physician  However we recommended trying the mineral oil/prune juice combination  She can also add Metamucil and more fiber to her diet  Recommend more exercises well to improve her bowel habits  Previous Colonoscopy and  Location of polyps if any:   none        Preoperative Clearance: None        ______________________________________________________    HPI:  Yong Adames is a 41 y  o female who was referred for evaluation of    Diagnostic   Currently:     Symptoms include:  no abdominal pain, but does have a occasional bright red blood per rectum  Also has significant abnormal bowel habits with bowel movement about every 3 days  No weight loss  Has not taken anything for this  Occasionally uses a steroid cream for her hemorrhoids            Family history of colon cancer: None reported             Anticoagulation: none    LABS:      Lab Results   Component Value Date    WBC 6 30 03/28/2014    HGB 13 3 03/28/2014    HCT 40 3 03/28/2014    MCV 89 03/28/2014     03/28/2014     Lab Results   Component Value Date     03/28/2014    K 3 6 03/28/2014     03/28/2014    CO2 28 03/28/2014    ANIONGAP 4 03/28/2014    BUN 8 03/28/2014 CREATININE 0 66 03/28/2014    GLUCOSE 90 03/28/2014    CALCIUM 9 0 03/28/2014    AST 21 03/28/2014    ALT 26 03/28/2014    ALKPHOS 66 03/28/2014    PROT 7 6 03/28/2014    BILITOT 0 8 03/28/2014     No results found for: HGBA1C  Lab Results   Component Value Date    INR 1 09 03/28/2014    PROTIME 13 6 03/28/2014         No orders to display           ROS:  General ROS: negative for - chills, fatigue, fever or night sweats, weight loss  Respiratory ROS: no cough, shortness of breath, or wheezing  Cardiovascular ROS: no chest pain or dyspnea on exertion  Genito-Urinary ROS: no dysuria, trouble voiding, or hematuria  Musculoskeletal ROS: negative for - gait disturbance, joint pain or muscle pain  Neurological ROS: no TIA or stroke symptoms  GI ROS: see HPI  Skin ROS: no new rashes or lesions   Lymphatic ROS: no new adenopathy noted by pt  GYN ROS: see HPI, no new GYN history or bleeding noted  Psy ROS: no new mental or behavioral disturbances           Imaging: I personally reviewed the radiology studies, my impression is as follows:  None          Patient Active Problem List   Diagnosis    LEN III (cervical intraepithelial neoplasia grade III) with severe dysplasia    Irregular bleeding    Chronic idiopathic constipation    Gastroesophageal reflux disease without esophagitis    Acne vulgaris    Herpes simplex vulvovaginitis    External hemorrhoids    Tonsil stone         Allergies:  Cephalosporins      Current Outpatient Prescriptions:     benzoyl peroxide-erythromycin (BENZAMYCIN) gel, Apply topically Twice daily, Disp: , Rfl:     famotidine (PEPCID) 40 MG tablet, TAKE 1 TABLET PRIOR TO BREAKFAST AND PRIOR TO DINNER, Disp: , Rfl: 5    valACYclovir (VALTREX) 500 mg tablet, Take 500 mg by mouth daily, Disp: , Rfl: 3    docusate sodium (COLACE) 100 mg capsule, Take 1 capsule (100 mg total) by mouth 2 (two) times a day (Patient not taking: Reported on 1/7/2019 ), Disp: 30 capsule, Rfl: 3    methocarbamol (ROBAXIN) 500 mg tablet, Take 1 tablet (500 mg total) by mouth daily at bedtime as needed for muscle spasms for up to 5 days, Disp: 20 tablet, Rfl: 0    naproxen (NAPROSYN) 500 mg tablet, Take 1 tablet (500 mg total) by mouth 2 (two) times a day with meals for 5 days, Disp: 10 tablet, Rfl: 0    polyethylene glycol (GLYCOLAX) powder, 2 tablespoons in 8 oz water daily (Patient not taking: Reported on 1/7/2019 ), Disp: 500 g, Rfl: 1    Past Medical History:   Diagnosis Date    Abnormal Pap smear of cervix     ASCUS with positive high risk HPV cervical     Resolved: 10/23/2017    Cancer (HCC)     pre-cancerous, cervical     Cellulitis of right foot 2017    GERD (gastroesophageal reflux disease)     Herpes     Hirsutism     HPV (human papilloma virus) infection     Psychiatric disorder     anxiety    Urinary tract infection        Past Surgical History:   Procedure Laterality Date    OVARIAN CYST REMOVAL      TUBAL LIGATION         Family History   Problem Relation Age of Onset    Hyperlipidemia Mother     Hypertension Mother     Diabetes Mother     No Known Problems Father     Breast cancer Paternal Grandmother     Alzheimer's disease Paternal Grandfather     Colon cancer Paternal Uncle        Social History     Social History    Marital status: Single     Spouse name: N/A    Number of children: 3    Years of education: N/A     Social History Main Topics    Smoking status: Never Smoker    Smokeless tobacco: Never Used    Alcohol use No    Drug use: No    Sexual activity: Not Currently     Other Topics Concern    None     Social History Narrative    Caffeine Use    Uses Safety Equipment-seatbelts       Exam:   Vitals:    01/07/19 1510   BP: 110/78   Pulse: 73   Temp: 98 2 °F (36 8 °C)           ______________________________________________________    PHYSICAL EXAM  General Appearance:    Alert, cooperative, no distress,      Head:    Normocephalic without obvious abnormality   Eyes: PERRL, conjunctiva/corneas clear, EOM's intact        Neck:   Supple, no adenopathy, no JVD   Back:     Symmetric, no spinal or CVA tenderness   Lungs:     Clear to auscultation bilaterally, no wheezing or rhonchi   Heart:    Regular rate and rhythm, S1 and S2 normal, no murmur   Abdomen:     Soft, nontender, nondistended  External rectal exam shows some hemorrhoidal skin tags of moderate size  Extremities:   Extremities normal  No clubbing, cyanosis or edema   Psych:   Normal Affect   Neurologic:   CNII-XII intact  Strength symmetric, speech intact                 Rhonda Cleveland MD  Date: 1/7/2019 Time: 3:27 PM       This report has been generated by a voice recognition software system  Therefore, there may be syntax, spelling, and/or grammatical errors  Please call if you've any questions  This  encounter has been billed by a non certified Coder  Informed consent for procedure was personally discussed, reviewed, and signed by Dr Amy Joyce  Discussion by Dr Amy Joyce was carried out regarding risks, benefits, and alternatives with the patient  Risks include but are not limited to:  bleeding, infection, and delayed wound healing or an open wound, pulmonary embolus, leaks from bowel or bile ducts or other viscus, transfusions, death  Discussed in further detail the more common complications and their rates of occurrence   was used if necessary  Patient expressed understanding of the issues discussed and wished/consented to proceed  All questions were answered by Dr Amy Joyce  Discussion performed between patient and the provider signing below  Signature:   Rhonda Cleveland MD  Date: 1/7/2019 Time: 3:27 PM                                                                                                                                        Informed consent for procedure was personally discussed, reviewed, and signed by Dr Amy Joyce   Discussion by Dr Amy Joyce was carried out regarding risks, benefits, and alternatives with the patient  Risks include but are not limited to:  bleeding, infection, and delayed wound healing or an open wound, pulmonary embolus, leaks from bowel or bile ducts or other viscus, transfusions, death  Discussed in further detail the more common complications and their rates of occurrence   was used if necessary  Patient expressed understanding of the issues discussed and wished/consented to proceed  All questions were answered by Dr Debbi Mascorro  Discussion performed between patient and the provider signing below  Signature:   Yanelis Naylor MD    Date: 1/7/2019 Time: 3:27 PM           Some portions of this record may have been generated with voice recognition software  There may be translation, syntax,  or grammatical errors  Occasional wrong word or "sound-a-like" substitutions may have occurred due to the inherent limitations of the voice recognition software  Read the chart carefully and recognize, using context, where substitutions may have occurred  If you have any questions, please contact the dictating provider for clarification or correction, as needed  This encounter has been coded by a non-certified coder

## 2019-01-22 NOTE — OP NOTE
COLONOSCOPY  Postoperative Note  PATIENT NAME: Paul Lopes  : 1980  MRN: [id number]  [facility] ROOM 01    Surgery Date: 2019      Pre operative diagnosis:   Occult blood in stools [R19 5]    Operative Indications:  Due for Colonoscopy    Previous Colonoscopy and  Location of polyps if any:   none      Post-Operative Diagnosis and Findings:     Diverticulosis and Hemorrhoids     Single polyp seen in Cecum        Consent:  The risks, benefits, and alternatives to the surgery were discussed with the patient and with the family prior to surgery if available, personally by Dr Josefa Traylor  If the consent was obtained by the physician assistant or other representative, the consent was reviewed once again personally by the operating physician  Common complications particular for this procedure as well as unusual complications were discussed, including but not limited to:  bleeding, wound infection, prolonged wound healing, open wounds, reoperation, leak from the bowel or viscus, leak from the bile duct or injury to adjacent or other organs or blood vessels in the abdomen, and possible surgery or reoperation  A  was used if necessary  The patient expressed understanding of the issues discussed and wished and consented to the procedure to proceed  All questions were answered  Dr Josefa Traylor personally discussed the informed consent with this patient  Procedure:    Procedure(s):  COLONOSCOPY with Cold forcep polypectomy      Surgeon(s) and Role:     * Husam Koroam MD - Primary    I was present for the entire procedure  Specimens:  ID Type Source Tests Collected by Time Destination   1 : 4mm polyp cecum Tissue Colon TISSUE EXAM Husam Koroma MD 2019 1038        Estimated Blood Loss:   Minimal    Anesthesia Type:   Choice     Procedure: The patient was seen in the Holding Room   The risks, benefits, complications, treatment options, and expected outcomes were discussed with the patient  The possibilities of reaction to medication, pulmonary aspiration, perforation of viscus, bleeding, recurrent infection, the need for additional procedures, failure to diagnose a condition, missed polyps, a complication requiring transfusion or operation were discussed with the patient  The patient concurred with the proposed plan, giving informed consent  The patient was taken to Endoscopy Suite, identified as [facility]  Staff confirmed patient name, , and procedure  A Time Out was held and the above information confirmed  A visual and digital exam was carried out of the anus and anal canal   Findings were normal unless specified below  The colonoscope was passed per anus and traversed to the cecum  The cecum was clearly visualized in the right lower quadrant by light reflex and palpation  The scope was withdrawn  There were mucosal lesion(s) and/or polyp(s) seen in the colon  These polyps were located at: cecum  The polyp(s) were addressed using polypectomy technique described above  There were diverticula scattered throughout the sigmoid colon and grade 1 and 2 hemorrhoids  A photograph was taken  The scope was retroflexed  There were no anorectal lesions other than the hemorrhoids  The scope was removed  The patient tolerated the procedure well  Withdrawal time was greater than 8 minutes  The bowel preparation was rated all according to the [facility] Bowel Preparation grading:     Cecum/Right colon: 3  Most /entire mucosa seen well  Transverse/Hepatic flexure/Splenic Flexure: 2  Minor residual staining or residual fragments but muscoa seen well  Left/Sigmoid/Rectum: 2  Minor residual staining or residual fragments but muscoa seen well      The bowel preparation was judged to be:  Good/Adequate    Complications: None    EBL:   None or < 0 5 cc cc    Condition: Stable to PACU    SIGNATURE: Neeraj Jasso MD   DATE: 2019   TIME: 10:48 AM            Follow-up endoscopy: Follow-up colonoscopy timing is difficult to predict without pathology and is not an exact science  Patients are told to follow up earlier than recommended if they have any symptoms or GI changes  However it is required that we predict possible endoscopy follow-up at the time of this procedure  Follow-up endoscopy is estimated to be recommended in:   10 years for normal screening follow-up, or very small hyperplastic appearing polyps  Some portions of this record may have been generated with voice recognition software  There may be translation, syntax,  or grammatical errors  Occasional wrong word or "sound-a-like" substitutions may have occurred due to the inherent limitations of the voice recognition software  Read the chart carefully and recognize, using context, where substations may have occurred  If you have any questions, please contact the dictating provider for clarification or correction, as needed

## 2019-01-22 NOTE — DISCHARGE SUMMARY
Discharge Summary - Paul Lopes 41 y o  female MRN: [id number]    Unit/Bed#: [facility] Encounter: [id number]      Pre-Operative Diagnosis: Pre-Op Diagnosis Codes:     * Occult blood in stools [R19 5]    Post-Operative Diagnosis: Post-Op Diagnosis Codes:     * Occult blood in stools [R19 5]     * Adenomatous polyps [D36 9]     * Hemorrhoids [455]    Procedures Performed:  Procedure(s):  COLONOSCOPY with polypectomy    Surgeon: Husam Koroma MD    See H & P for full details of admission and Operative Note for full details of operations performed  Patient was seen and examined prior to discharge  Provisions for Follow-Up Care:  See After Visit Summary for information related to follow-up care and home orders  Disposition: Home, in stable condition  Planned Readmission: No    Discharge Medications:  See after visit summary for reconciled discharge medications provided to patient and family  Post Operative instructions: Reviewed with patient and/or family  Some portions of this record may have been generated with voice recognition software  There may be translation, syntax,  or grammatical errors  Occasional wrong word or "sound-a-like" substitutions may have occurred due to the inherent limitations of the voice recognition software  Read the chart carefully and recognize, using context, where substitutions may have occurred  If you have any questions, please contact the dictating provider for clarification or correction, as needed  This encounter has been coded by non certified Coder      Signature:   Husam Koroma MD  Date: 1/22/2019 Time: 10:49 AM

## 2019-01-22 NOTE — ANESTHESIA PREPROCEDURE EVALUATION
Review of Systems/Medical History  Patient summary reviewed  Chart reviewed      Cardiovascular  Negative cardio ROS    Pulmonary  Negative pulmonary ROS        GI/Hepatic    GERD well controlled, Bowel prep       Negative  ROS        Endo/Other  Negative endo/other ROS      GYN  Negative gynecology ROS          Hematology  Negative hematology ROS      Musculoskeletal  Negative musculoskeletal ROS        Neurology  Negative neurology ROS      Psychology   Negative psychology ROS              Physical Exam    Airway    Mallampati score: II  TM Distance: >3 FB  Neck ROM: full     Dental   No notable dental hx     Cardiovascular  Comment: Negative ROS, Rhythm: regular, Rate: normal, Cardiovascular exam normal    Pulmonary  Pulmonary exam normal Breath sounds clear to auscultation,     Other Findings        Anesthesia Plan  ASA Score- 2     Anesthesia Type- IV sedation with anesthesia and general with ASA Monitors  Additional Monitors:   Airway Plan:         Plan Factors-    Induction- intravenous  Postoperative Plan-     Informed Consent- Anesthetic plan and risks discussed with patient

## 2019-01-22 NOTE — DISCHARGE INSTRUCTIONS
[facility]  Endoscopy Post-Operative Instructions  Dr Raleigh Haley MD, FACS    Procedure: Colonoscopy    Findings:  Hemorrhoids and Colon Polyp(s)    Follow-Up: You will need a repeat Endoscopy in (generally)10 years  Will await final pathology report for final determination of number of years until your follow up endoscopy, if you had polyps on this exam   Different types of polyps require different lengths of follow up surveillance  Please call our office or your primary doctor's office if you have any questions, once the report is returned  You should have an endoscopy sooner than recommended if you have any symptoms of bleeding or change in stools or other concerns  You will receive a call from our office with your results, in addition to the the preliminary results you received today  You will usually receive a follow-up letter from our office in 1-2 weeks  Call the office if you do not hear from us  You are welcome to also schedule an office visit if desired to discuss the results further  It is your responsibility to contact our office for results in 1- 2 weeks if you do not hear from us  If a follow up endoscopy is needed, you are responsible for arranging that follow up appointment at the appropriate time  The office may or may not issue a reminder at that future time  Please take responsibility for your own follow up healthcare  Diet: Eat a light snack first, and then resume your previous diet  Discharge Medications:  See after visit summary for reconciled discharge medications provided to patient and family        Current Facility-Administered Medications:     sodium chloride 0 9 % infusion, 125 mL/hr, Intravenous, Continuous, Zachariah Smiley DO, Stopped at 01/22/19 1046    Facility-Administered Medications Ordered in Other Encounters:     lidocaine (cardiac) injection, , Intravenous, PRN, Marilin Best CRNA, 20 mg at 01/22/19 1031   propofol (DIPRIVAN) 200 MG/20ML bolus injection, , Intravenous, PRN, Marilin Best CRNA, 20 mg at 01/22/19 1044  Do not take aspirin or blood thinning medications for 2 days following procedure  Tylenol is okay  You may have been given a new medication  Please take this (usually an anti-ulcer -type medication) and see your primary care doctor for refills and follow up medications if needed       After the test: Notify physician for arm swelling or pain at the intravenous site  You may have some abdominal discomfort following the procedure  Belching or passing flatus will help relieve it  Following upper endoscopy, you may experience a temporary sore throat  Saline gargles or lozenges can be taken to relieve sore throat Following lower endoscopy, minor rectal bleeding is not uncommon      Activity: Do not drive a car, operate machinery, or sign legal documents for 24 hours after your procedure  Normal activity may be resumed on the day following the procedure      Call the office at [phone] for any of the following: Severe abdominal pain, significant rectal bleeding, chills, or fever above 100°, new onset of persistent cough or persistent vomiting      [address] [facility]  Phone: [phone]                        Colorectal Polyps   WHAT YOU NEED TO KNOW:   Colorectal polyps are small growths of tissue in the lining of the colon and rectum  Most polyps are hyperplastic polyps and are usually benign (noncancerous)  Certain types of polyps, called adenomatous polyps, may turn into cancer  DISCHARGE INSTRUCTIONS:   Follow up with your healthcare provider or gastroenterologist as directed: You may need to return for more tests, such as another colonoscopy  Write down your questions so you remember to ask them during your visits    Reduce your risk for colorectal polyps:   · Eat a variety of healthy foods:  Healthy foods include fruit, vegetables, whole-grain breads, low-fat dairy products, beans, lean meat, and fish  Ask if you need to be on a special diet  · Maintain a healthy weight:  Ask your healthcare provider if you need to lose weight and how much you need to lose  Ask for help with a weight loss program     · Exercise:  Begin to exercise slowly and do more as you get stronger  Talk with your healthcare provider before you start an exercise program      · Limit alcohol:  Your risk for polyps increases the more you drink  · Do not smoke: If you smoke, it is never too late to quit  Ask for information about how to stop  For support and more information:   · Dhruv Chacko ([facility])  [address] [address]  Phone: [phone]  Web Address: www digestive  niddk nih gov  Contact your healthcare provider or gastroenterologist if:   · You have a fever  · You have chills, a cough, or feel weak and achy  · You have abdominal pain that does not go away or gets worse after you take medicine  · Your abdomen is swollen  · You are losing weight without trying  · You have questions or concerns about your condition or care  Seek care immediately or call 911 if:   · You have sudden shortness of breath  · You have a fast heart rate, fast breathing, or are too dizzy to stand up  · You have severe abdominal pain  · You see blood in your bowel movement  © 2017 [address] Information is for End User's use only and may not be sold, redistributed or otherwise used for commercial purposes  All illustrations and images included in CareNotes® are the copyrighted property of [employer]  or Jared Braun  The above information is an  only  It is not intended as medical advice for individual conditions or treatments   Talk to your doctor, nurse or pharmacist before following any medical regimen to see if it is safe and effective for you  Colonoscopy   WHAT YOU NEED TO KNOW:   A colonoscopy is a procedure to examine the inside of your colon (intestine) with a scope  Polyps or tissue growths may have been removed during your colonoscopy  It is normal to feel bloated and to have some abdominal discomfort  You should be passing gas  If you have hemorrhoids or you had polyps removed, you may have a small amount of bleeding  DISCHARGE INSTRUCTIONS:   Seek care immediately if:   · You have a large amount of bright red blood in your bowel movements  · Your abdomen is hard and firm and you have severe pain  · You have sudden trouble breathing  Contact your healthcare provider if:   · You develop a rash or hives  · You have a fever within 24 hours of your procedure  · You have not had a bowel movement for 3 days after your procedure  · You have questions or concerns about your condition or care  Activity:   · Do not lift, strain, or run  for 3 days after your procedure  · Rest after your procedure  You have been given medicine to relax you  Do not  drive or make important decisions until the day after your procedure  Return to your normal activity as directed  · Relieve gas and discomfort from bloating  by lying on your right side with a heating pad on your abdomen  You may need to take short walks to help the gas move out  Eat small meals until bloating is relieved  If you had polyps removed: For 7 days after your procedure:  · Do not  take aspirin  · Do not  go on long car rides  Help prevent constipation:   · Eat a variety of healthy foods  Healthy foods include fruit, vegetables, whole-grain breads, low-fat dairy products, beans, lean meat, and fish  Ask if you need to be on a special diet  Your healthcare provider may recommend that you eat high-fiber foods such as cooked beans  Fiber helps you have regular bowel movements  · Drink liquids as directed    Adults should drink between 9 and 13 eight-ounce cups of liquid every day  Ask what amount is best for you  For most people, good liquids to drink are water, juice, and milk  · Exercise as directed  Talk to your healthcare provider about the best exercise plan for you  Exercise can help prevent constipation, decrease your blood pressure and improve your health  Follow up with your healthcare provider as directed:  Write down your questions so you remember to ask them during your visits  © 2017 [facility] Information is for End User's use only and may not be sold, redistributed or otherwise used for commercial purposes  All illustrations and images included in CareNotes® are the copyrighted property of A D A M , Inc  or Jared Braun  The above information is an  only  It is not intended as medical advice for individual conditions or treatments  Talk to your doctor, nurse or pharmacist before following any medical regimen to see if it is safe and effective for you  Diverticulosis   WHAT YOU NEED TO KNOW:   Diverticulosis is a condition that causes small pockets called diverticula to form in your intestine  These pockets make it difficult for bowel movements to pass through your digestive system  DISCHARGE INSTRUCTIONS:   Seek care immediately if:   · You have severe pain on the left side of your lower abdomen  · Your bowel movements are bright or dark red  Contact your healthcare provider if:   · You have a fever and chills  · You feel dizzy or lightheaded  · You have nausea, or you are vomiting  · You have a change in your bowel movements  · You have questions or concerns about your condition or care  Medicines:   · Medicines  to soften your bowel movements may be given  You may also need medicines to treat symptoms such as bloating and pain  · Take your medicine as directed    Contact your healthcare provider if you think your medicine is not helping or if you have side effects  Tell him or her if you are allergic to any medicine  Keep a list of the medicines, vitamins, and herbs you take  Include the amounts, and when and why you take them  Bring the list or the pill bottles to follow-up visits  Carry your medicine list with you in case of an emergency  Self-care: The goal of treatment is to manage any symptoms you have and prevent other problems such as diverticulitis  Diverticulitis is swelling or infection of the diverticula  Your healthcare provider may recommend any of the following:  · Eat a variety of high-fiber foods  High-fiber foods help you have regular bowel movements  High-fiber foods include cooked beans, fruits, vegetables, and some cereals  Most adults need 25 to 35 grams of fiber each day  Your healthcare provider may recommend that you have more  Ask your healthcare provider how much fiber you need  Increase fiber slowly  You may have abdominal discomfort, bloating, and gas if you add fiber to your diet too quickly  You may need to take a fiber supplement if you are not getting enough fiber from food  · Drink liquids as directed  You may need to drink 2 to 3 liters (8 to 12 cups) of liquids every day  Ask your healthcare provider how much liquid to drink each day and which liquids are best for you  · Apply heat  on your abdomen for 20 to 30 minutes every 2 hours for as many days as directed  Heat helps decrease pain and muscle spasms  Help prevent diverticulitis or other symptoms: The following may help decrease your risk for diverticulitis or symptoms, such as bleeding  Talk to your provider about these or other things you can do to prevent problems that may occur with diverticulosis  · Exercise regularly  Ask your healthcare provider about the best exercise plan for you  Exercise can help you have regular bowel movements  Get 30 minutes of exercise on most days of the week  · Maintain a healthy weight    Ask your healthcare provider how much you should weigh  Ask him or her to help you create a weight loss plan if you are overweight  · Do not smoke  Nicotine and other chemicals in cigarettes increase your risk for diverticulitis  Ask your healthcare provider for information if you currently smoke and need help to quit  E-cigarettes or smokeless tobacco still contain nicotine  Talk to your healthcare provider before you use these products  · Ask your healthcare provider if it is safe to take NSAIDs  NSAIDs may increase your risk of diverticulitis  Follow up with your healthcare provider as directed:  Write down your questions so you remember to ask them during your visits  © 2017 [address] Information is for End User's use only and may not be sold, redistributed or otherwise used for commercial purposes  All illustrations and images included in CareNotes® are the copyrighted property of [employer]  or Jared Braun  The above information is an  only  It is not intended as medical advice for individual conditions or treatments  Talk to your doctor, nurse or pharmacist before following any medical regimen to see if it is safe and effective for you  Diverticulosis Diet   WHAT YOU NEED TO KNOW:   What is a diverticulosis diet? A diverticulosis diet includes high-fiber foods  High-fiber foods help you have regular bowel movements  Extra fiber may decrease your risk of forming new diverticula (small pockets) in your intestine  A high-fiber diet may also help prevent diverticulitis  Diverticulitis is a painful condition that occurs when diverticula become inflamed or infected  You do not need to avoid nuts, seeds, corn, or popcorn while you are on a diverticulosis diet  How much fiber do I need? You may need 25 to 35 grams of fiber each day  Ask your dietitian or healthcare provider how much fiber you should have  Increase your intake of fiber slowly   When you eat more fiber, you may have gas and feel bloated  You may need to take a fiber supplement if you do not get enough fiber from food  Drink plenty of liquids as you increase the fiber in your diet  Your dietitian or healthcare provider may recommend 8 eight-ounce cups or more each day  Ask which liquids are best for you  Which foods are high in fiber? · Foods with at least 4 grams of fiber per serving:      ¨ ? to ½ cup of high-fiber cereal (check the nutrition label on the box)    ¨ ½ cup of blackberries or raspberries    ¨ 4 dried prunes    ¨ 1 cooked artichoke    ¨ ½ cup of cooked legumes, such as lentils, or red, kidney, and gonzales beans    · Foods with 1 to 3 grams of fiber per serving:      ¨ 1 slice of whole-wheat, pumpernickel, or rye bread    ¨ 4 whole-wheat crackers    ¨ ½ cup of cereal with 1 to 3 grams of fiber per serving (check the nutrition label on the box)    ¨ 1 piece of fruit, such as an apple, banana, pear, kiwi, or orange    ¨ 3 dates    ¨ ½ cup of canned apricots, fruit cocktail, peaches, or pears    ¨ ½ cup of raw or cooked vegetables, such as carrots, cauliflower, cabbage, spinach, squash, or corn  When should I contact my healthcare provider? · You have questions about a high-fiber diet  · You have a change in your bowel movements  · You have an upset stomach  · You have a fever  · You have pain in your lower abdomen on the left side  · You have questions about your condition or care  CARE AGREEMENT:   You have the right to help plan your care  Learn about your health condition and how it may be treated  Discuss treatment options with your caregivers to decide what care you want to receive  You always have the right to refuse treatment  The above information is an  only  It is not intended as medical advice for individual conditions or treatments  Talk to your doctor, nurse or pharmacist before following any medical regimen to see if it is safe and effective for you    © 2017 [facility] [address] is for End User's use only and may not be sold, redistributed or otherwise used for commercial purposes  All illustrations and images included in CareNotes® are the copyrighted property of A D A M , Inc  or Jared Braun  Hemorrhdyana   WHAT YOU NEED TO KNOW:   Hemorrhoids are swollen blood vessels inside your rectum (internal hemorrhoids) or on your anus (external hemorrhoids)  Sometimes a hemorrhoid may prolapse  This means it extends out of your anus  DISCHARGE INSTRUCTIONS:   Seek care immediately if:   · You have severe pain in your rectum or around your anus  · You have severe pain in your abdomen and you are vomiting  · You have bleeding from your anus that soaks through your underwear  Contact your healthcare provider if:   · You have frequent and painful bowel movements  · Your hemorrhoid looks or feels more swollen than usual      · You do not have a bowel movement for 2 days or more  · You see or feel tissue coming through your anus  · You have questions or concerns about your condition or care  Medicines: You may  need any of the following:  · Medicine  may be given to decrease pain, swelling, and itching  The medicine may come as a pad, cream, or ointment  · Stool softeners  help treat or prevent constipation  · NSAIDs , such as ibuprofen, help decrease swelling, pain, and fever  NSAIDs can cause stomach bleeding or kidney problems in certain people  If you take blood thinner medicine, always ask your healthcare provider if NSAIDs are safe for you  Always read the medicine label and follow directions  · Take your medicine as directed  Contact your healthcare provider if you think your medicine is not helping or if you have side effects  Tell him or her if you are allergic to any medicine  Keep a list of the medicines, vitamins, and herbs you take  Include the amounts, and when and why you take them   Bring the list or the pill bottles to follow-up visits  Carry your medicine list with you in case of an emergency  Manage your symptoms:   · Apply ice on your anus for 15 to 20 minutes every hour or as directed  Use an ice pack, or put crushed ice in a plastic bag  Cover it with a towel before you apply it to your anus  Ice helps prevent tissue damage and decreases swelling and pain  · Take a sitz bath  Fill a bathtub with 4 to 6 inches of warm water  You may also use a sitz bath pan that fits inside a toilet bowl  Sit in the sitz bath for 15 minutes  Do this 3 times a day, and after each bowel movement  The warm water can help decrease pain and swelling  · Keep your anal area clean  Gently wash the area with warm water daily  Soap may irritate the area  After a bowel movement, wipe with moist towelettes or wet toilet paper  Dry toilet paper can irritate the area  Prevent hemorrhoids:   · Do not strain to have a bowel movement  Do not sit on the toilet too long  These actions can increase pressure on the tissues in your rectum and anus  · Drink plenty of liquids  Liquids can help prevent constipation  Ask how much liquid to drink each day and which liquids are best for you  · Eat a variety of high-fiber foods  Examples include fruits, vegetables, and whole grains  Ask your healthcare provider how much fiber you need each day  You may need to take a fiber supplement  · Exercise as directed  Exercise, such as walking, may make it easier to have a bowel movement  Ask your healthcare provider to help you create an exercise plan  · Do not have anal sex  Anal sex can weaken the skin around your rectum and anus  · Avoid heavy lifting  This can cause straining and increase your risk for another hemorrhoid  Follow up with your healthcare provider as directed:  Write down your questions so you remember to ask them during your visits     © 2017 [address] Information is for End User's use only and may not be sold, redistributed or otherwise used for commercial purposes  All illustrations and images included in CareNotes® are the copyrighted property of A D A M , Inc  or Jared Braun  The above information is an  only  It is not intended as medical advice for individual conditions or treatments  Talk to your doctor, nurse or pharmacist before following any medical regimen to see if it is safe and effective for you

## 2019-01-22 NOTE — PROGRESS NOTES
Pt  Given warm blanket  Pt  Informed Dr, is 45 minutes behind  Pt  Agreeable, reading book  I told her we would keep her posted on the timeframe

## 2019-01-23 ENCOUNTER — TELEPHONE (OUTPATIENT)
Dept: SURGERY | Facility: CLINIC | Age: 39
End: 2019-01-23

## 2019-01-23 NOTE — TELEPHONE ENCOUNTER
Called patient post colonoscopy 1/22/19  Patient states she is feeling well  No F/C/N/V  States she has not been able to have a BM but is aware that this may occur after procedure  Patient also experienced chronic constipation prior to procedure  Aware that pathology has been sent out and that she will receive a call with results  Patient aware that she will receive a letter in the mail with recommended follow up  Will call office with questions or concerns  Path pending

## 2019-01-28 DIAGNOSIS — B00.9 HERPES: Primary | ICD-10-CM

## 2019-01-29 RX ORDER — VALACYCLOVIR HYDROCHLORIDE 500 MG/1
500 TABLET, FILM COATED ORAL DAILY
Qty: 90 TABLET | Refills: 0 | Status: SHIPPED | OUTPATIENT
Start: 2019-01-29 | End: 2019-04-19 | Stop reason: ALTCHOICE

## 2019-02-26 ENCOUNTER — TELEPHONE (OUTPATIENT)
Dept: OBGYN CLINIC | Facility: CLINIC | Age: 39
End: 2019-02-26

## 2019-02-26 NOTE — TELEPHONE ENCOUNTER
Patient states that Doctor Cornelius Sheppard and her discuss the ablation procedure  Patient would like to go ahead with the surgery  Patient was aware she would need to come in and sign consent forms with the doctor  Appointment was made

## 2019-03-05 ENCOUNTER — OFFICE VISIT (OUTPATIENT)
Dept: OBGYN CLINIC | Facility: CLINIC | Age: 39
End: 2019-03-05
Payer: COMMERCIAL

## 2019-03-05 VITALS
HEIGHT: 64 IN | WEIGHT: 140 LBS | DIASTOLIC BLOOD PRESSURE: 62 MMHG | BODY MASS INDEX: 23.9 KG/M2 | SYSTOLIC BLOOD PRESSURE: 98 MMHG

## 2019-03-05 DIAGNOSIS — N92.1 MENORRHAGIA WITH IRREGULAR CYCLE: Primary | ICD-10-CM

## 2019-03-05 PROCEDURE — 88305 TISSUE EXAM BY PATHOLOGIST: CPT | Performed by: PATHOLOGY

## 2019-03-05 PROCEDURE — 99214 OFFICE O/P EST MOD 30 MIN: CPT | Performed by: OBSTETRICS & GYNECOLOGY

## 2019-03-05 PROCEDURE — 58100 BIOPSY OF UTERUS LINING: CPT | Performed by: OBSTETRICS & GYNECOLOGY

## 2019-03-05 NOTE — PROGRESS NOTES
CC:  Menorrhagia    HPI: Dung Melton presents for discussion on ablation therapy for her ongoing menorrhagia issues  This patient who has cycles that very both in number of days and amount of bleeding would like to proceed with a an ablation procedure  She did have a endometrial biopsy that was normal October of 2017 and did have a biopsy today to ensure that she has an up-to-date sample  We reviewed the Carlita procedure  Pros and cons and the least the most common risks were discussed  She would like to proceed and elicit was personally consented by myself  Past Medical History:  Past Medical History:   Diagnosis Date    Abnormal Pap smear of cervix     ASCUS with positive high risk HPV cervical     Resolved: 10/23/2017    BRBPR (bright red blood per rectum)     Cancer (HCC)     pre-cancerous, cervical     Cellulitis of right foot 2017    Constipation     "significant"    Family hx of colon cancer     Paternal uncle    GERD (gastroesophageal reflux disease)     Herpes     Hirsutism     HPV (human papilloma virus) infection     Psychiatric disorder     anxiety    Urinary tract infection        Past Surgical History:  Past Surgical History:   Procedure Laterality Date    OVARIAN CYST REMOVAL      DE COLONOSCOPY FLX DX W/COLLJ SPEC WHEN PFRMD N/A 1/22/2019    Procedure: COLONOSCOPY with polypectomy;  Surgeon: Russell Aguilar MD;  Location[facility]; Service: General    TUBAL LIGATION      WISDOM TOOTH EXTRACTION         Past OB/Gyn History:  Menstrual cycles as noted under the HPI  Denies any history of sexually transmitted infection  No history of abnormal pap smears   Her last pap smear was 2017 and normal     ALLERGIES:   Allergies   Allergen Reactions    Cephalosporins Hives       MEDS:   Current Outpatient Medications:     benzoyl peroxide-erythromycin (BENZAMYCIN) gel    famotidine (PEPCID) 40 MG tablet    polyethylene glycol (GLYCOLAX) powder    valACYclovir (VALTREX) 500 mg tablet    Family History:  Family History   Problem Relation Age of Onset   Ulicesna Marce Hyperlipidemia Mother     Hypertension Mother     Diabetes Mother     No Known Problems Father     Breast cancer Paternal Grandmother     Alzheimer's disease Paternal Grandfather     Colon cancer Paternal Uncle        Social History:  Social History     Socioeconomic History    Marital status: Single     Spouse name: Not on file    Number of children: 3    Years of education: Not on file    Highest education level: Not on file   Occupational History    Not on file   Social Needs    Financial resource strain: Not on file    Food insecurity:     Worry: Not on file     Inability: Not on file    Transportation needs:     Medical: Not on file     Non-medical: Not on file   Tobacco Use    Smoking status: Never Smoker    Smokeless tobacco: Never Used   Substance and Sexual Activity    Alcohol use: No    Drug use: No    Sexual activity: Not Currently   Lifestyle    Physical activity:     Days per week: Not on file     Minutes per session: Not on file    Stress: Not on file   Relationships    Social connections:     Talks on phone: Not on file     Gets together: Not on file     Attends Advent service: Not on file     Active member of club or organization: Not on file     Attends meetings of clubs or organizations: Not on file     Relationship status: Not on file    Intimate partner violence:     Fear of current or ex partner: Not on file     Emotionally abused: Not on file     Physically abused: Not on file     Forced sexual activity: Not on file   Other Topics Concern    Not on file   Social History Narrative    Caffeine Use    Uses Safety Equipment-seatbelts         Review of Systems:  Gen:   Denies fatigue, chills, nausea, vomiting, fever  Skin: No rashes or discolorations of any concern  RESP: Denies SOB, no cough  CV: Denies chest pain or palpitations     Breasts: Denies masses, pain, skin changes and nipple discharge  GI: Denies abdominal pain, heartburn, nausea, vomiting, changes in bowel habits  : Denies dysuria, frequency, CVA tenderness, incontinence and hematuria  Genitalia: Denies abnormal vaginal discharge, external lesions, rashes, pelvic pain, pressure, but positive for abnormal bleeding  Rectal:  Denies pain, bleeding, hemorrhoids,    Physical Exam:  BP 98/62 (BP Location: Left arm, Patient Position: Sitting, Cuff Size: Standard)   Ht 5' 3 5" (1 613 m)   Wt 63 5 kg (140 lb)   LMP 03/05/2019 (Exact Date)   BMI 24 41 kg/m²    Gen: The patient was alert and oriented x3, pleasant well-appearing female in no acute distress  Neck:  Unremarkable, no lymphadenopathy, no thyromegaly, or tenderness  CV:  RRR, no murmurs  Resp:  Clear to auscultation bilaterally, no wheezing  Abd:  Soft, nontender, nondistended, no masses or organomegaly  Back:  No CVA tenderness, no tenderness to palpation along spine  Pelvic  Normal appearing external female genitalia, no visible lesions, no rashes  Vagina is free of discharge, normal vaginal epithelium, no abnormal  lesions, no evidence of prolapse anteriorly or posteriorly  Normal appearing cervix, mobile and nontender  Uterus is normal size, mobile and, nontender  No palpable adnexal masses or tenderness  No anoperineal lesions  Skin:  No concerning lesions  Extremeties: No edema    Under aseptic technique, an aspiration endometrial sampling was performed with the uterus noted to be anteverted and sounded to 8 cm  A moderate amount of tissue was obtained and the patient tolerated the procedure well  The tissue will be submitted for surgical pathology  Assessment & Plan:   1  Menorrhagia, to proceed with hysteroscopy a Carlita ablation

## 2019-03-07 PROBLEM — N92.0 MENORRHAGIA WITH REGULAR CYCLE: Status: ACTIVE | Noted: 2019-03-07

## 2019-03-08 ENCOUNTER — TELEPHONE (OUTPATIENT)
Dept: OBGYN CLINIC | Facility: CLINIC | Age: 39
End: 2019-03-08

## 2019-03-08 NOTE — TELEPHONE ENCOUNTER
----- Message from Mera Cavanaugh MD sent at 3/7/2019 12:56 PM EST -----  Please inform patient her results are normal

## 2019-04-13 DIAGNOSIS — B00.9 HERPES: ICD-10-CM

## 2019-04-13 DIAGNOSIS — K59.04 CHRONIC IDIOPATHIC CONSTIPATION: ICD-10-CM

## 2019-04-15 RX ORDER — DOCUSATE SODIUM 100 MG/1
CAPSULE, LIQUID FILLED ORAL
Qty: 30 CAPSULE | Refills: 0 | Status: SHIPPED | OUTPATIENT
Start: 2019-04-15

## 2019-04-16 ENCOUNTER — TELEPHONE (OUTPATIENT)
Dept: OBGYN CLINIC | Facility: CLINIC | Age: 39
End: 2019-04-16

## 2019-04-16 ENCOUNTER — APPOINTMENT (OUTPATIENT)
Dept: LAB | Facility: HOSPITAL | Age: 39
End: 2019-04-16
Attending: OBSTETRICS & GYNECOLOGY
Payer: COMMERCIAL

## 2019-04-16 DIAGNOSIS — Z01.818 PRE-OP TESTING: ICD-10-CM

## 2019-04-16 LAB
ANION GAP SERPL CALCULATED.3IONS-SCNC: 8 MMOL/L (ref 4–13)
BUN SERPL-MCNC: 11 MG/DL (ref 5–25)
CALCIUM SERPL-MCNC: 9.2 MG/DL (ref 8.3–10.1)
CHLORIDE SERPL-SCNC: 105 MMOL/L (ref 100–108)
CO2 SERPL-SCNC: 29 MMOL/L (ref 21–32)
CREAT SERPL-MCNC: 0.7 MG/DL (ref 0.6–1.3)
ERYTHROCYTE [DISTWIDTH] IN BLOOD BY AUTOMATED COUNT: 16.4 % (ref 11.6–15.1)
GFR SERPL CREATININE-BSD FRML MDRD: 112 ML/MIN/1.73SQ M
GLUCOSE SERPL-MCNC: 85 MG/DL (ref 65–140)
HCT VFR BLD AUTO: 31.9 % (ref 34.8–46.1)
HGB BLD-MCNC: 9.2 G/DL (ref 11.5–15.4)
MCH RBC QN AUTO: 22.9 PG (ref 26.8–34.3)
MCHC RBC AUTO-ENTMCNC: 28.8 G/DL (ref 31.4–37.4)
MCV RBC AUTO: 80 FL (ref 82–98)
PLATELET # BLD AUTO: 431 THOUSANDS/UL (ref 149–390)
PMV BLD AUTO: 9.4 FL (ref 8.9–12.7)
POTASSIUM SERPL-SCNC: 3.8 MMOL/L (ref 3.5–5.3)
RBC # BLD AUTO: 4.01 MILLION/UL (ref 3.81–5.12)
SODIUM SERPL-SCNC: 142 MMOL/L (ref 136–145)
WBC # BLD AUTO: 5.82 THOUSAND/UL (ref 4.31–10.16)

## 2019-04-16 PROCEDURE — 85027 COMPLETE CBC AUTOMATED: CPT

## 2019-04-16 PROCEDURE — 36415 COLL VENOUS BLD VENIPUNCTURE: CPT

## 2019-04-16 PROCEDURE — 80048 BASIC METABOLIC PNL TOTAL CA: CPT

## 2019-04-19 ENCOUNTER — OFFICE VISIT (OUTPATIENT)
Dept: OBGYN CLINIC | Facility: CLINIC | Age: 39
End: 2019-04-19
Payer: COMMERCIAL

## 2019-04-19 VITALS — SYSTOLIC BLOOD PRESSURE: 102 MMHG | BODY MASS INDEX: 24.24 KG/M2 | WEIGHT: 139 LBS | DIASTOLIC BLOOD PRESSURE: 80 MMHG

## 2019-04-19 DIAGNOSIS — A60.04 HERPES SIMPLEX VULVOVAGINITIS: ICD-10-CM

## 2019-04-19 DIAGNOSIS — Z11.3 SCREENING FOR STD (SEXUALLY TRANSMITTED DISEASE): Primary | ICD-10-CM

## 2019-04-19 DIAGNOSIS — B37.3 VAGINAL CANDIDIASIS: ICD-10-CM

## 2019-04-19 DIAGNOSIS — B96.89 BACTERIAL VAGINOSIS: ICD-10-CM

## 2019-04-19 DIAGNOSIS — N92.0 MENORRHAGIA WITH REGULAR CYCLE: ICD-10-CM

## 2019-04-19 DIAGNOSIS — N76.0 BACTERIAL VAGINOSIS: ICD-10-CM

## 2019-04-19 LAB — SL AMB POCT WET MOUNT: 5

## 2019-04-19 PROCEDURE — 99214 OFFICE O/P EST MOD 30 MIN: CPT | Performed by: OBSTETRICS & GYNECOLOGY

## 2019-04-19 PROCEDURE — 87591 N.GONORRHOEAE DNA AMP PROB: CPT | Performed by: OBSTETRICS & GYNECOLOGY

## 2019-04-19 PROCEDURE — 87491 CHLMYD TRACH DNA AMP PROBE: CPT | Performed by: OBSTETRICS & GYNECOLOGY

## 2019-04-19 PROCEDURE — 87210 SMEAR WET MOUNT SALINE/INK: CPT | Performed by: OBSTETRICS & GYNECOLOGY

## 2019-04-19 RX ORDER — VALACYCLOVIR HYDROCHLORIDE 1 G/1
1000 TABLET, FILM COATED ORAL DAILY
Qty: 30 TABLET | Refills: 12 | Status: SHIPPED | OUTPATIENT
Start: 2019-04-19 | End: 2020-04-27

## 2019-04-19 RX ORDER — METRONIDAZOLE 500 MG/1
500 TABLET ORAL 2 TIMES DAILY
Qty: 14 TABLET | Refills: 0 | Status: SHIPPED | OUTPATIENT
Start: 2019-04-19 | End: 2019-04-26

## 2019-04-19 RX ORDER — FLUCONAZOLE 150 MG/1
150 TABLET ORAL ONCE
Qty: 2 TABLET | Refills: 0 | Status: SHIPPED | OUTPATIENT
Start: 2019-04-19 | End: 2019-04-19

## 2019-04-20 LAB
C TRACH DNA SPEC QL NAA+PROBE: NEGATIVE
N GONORRHOEA DNA SPEC QL NAA+PROBE: NEGATIVE

## 2019-04-22 ENCOUNTER — ANESTHESIA EVENT (OUTPATIENT)
Dept: PERIOP | Facility: HOSPITAL | Age: 39
End: 2019-04-22
Payer: COMMERCIAL

## 2019-04-22 ENCOUNTER — OFFICE VISIT (OUTPATIENT)
Dept: FAMILY MEDICINE CLINIC | Facility: CLINIC | Age: 39
End: 2019-04-22
Payer: COMMERCIAL

## 2019-04-22 VITALS
BODY MASS INDEX: 23.99 KG/M2 | HEIGHT: 64 IN | DIASTOLIC BLOOD PRESSURE: 60 MMHG | WEIGHT: 140.5 LBS | SYSTOLIC BLOOD PRESSURE: 102 MMHG

## 2019-04-22 DIAGNOSIS — B35.1 ONYCHOMYCOSIS OF TOENAIL: ICD-10-CM

## 2019-04-22 DIAGNOSIS — L20.84 INTRINSIC ECZEMA: Primary | ICD-10-CM

## 2019-04-22 PROBLEM — J35.8 TONSIL STONE: Status: RESOLVED | Noted: 2018-11-15 | Resolved: 2019-04-22

## 2019-04-22 PROCEDURE — 99213 OFFICE O/P EST LOW 20 MIN: CPT | Performed by: FAMILY MEDICINE

## 2019-04-22 PROCEDURE — NC001 PR NO CHARGE: Performed by: OBSTETRICS & GYNECOLOGY

## 2019-04-23 ENCOUNTER — ANESTHESIA (OUTPATIENT)
Dept: PERIOP | Facility: HOSPITAL | Age: 39
End: 2019-04-23
Payer: COMMERCIAL

## 2019-04-23 ENCOUNTER — HOSPITAL ENCOUNTER (OUTPATIENT)
Facility: HOSPITAL | Age: 39
Setting detail: OUTPATIENT SURGERY
Discharge: HOME | End: 2019-04-23
Attending: OBSTETRICS & GYNECOLOGY | Admitting: OBSTETRICS & GYNECOLOGY
Payer: COMMERCIAL

## 2019-04-23 VITALS
OXYGEN SATURATION: 100 % | RESPIRATION RATE: 13 BRPM | BODY MASS INDEX: 23.9 KG/M2 | HEART RATE: 58 BPM | TEMPERATURE: 97.6 F | WEIGHT: 140 LBS | DIASTOLIC BLOOD PRESSURE: 73 MMHG | HEIGHT: 64 IN | SYSTOLIC BLOOD PRESSURE: 124 MMHG

## 2019-04-23 DIAGNOSIS — Z98.890 S/P ENDOMETRIAL ABLATION: Primary | ICD-10-CM

## 2019-04-23 LAB — EXT PREGNANCY TEST URINE: NEGATIVE

## 2019-04-23 PROCEDURE — 81025 URINE PREGNANCY TEST: CPT | Performed by: ANESTHESIOLOGY

## 2019-04-23 PROCEDURE — 58563 HYSTEROSCOPY ABLATION: CPT | Performed by: OBSTETRICS & GYNECOLOGY

## 2019-04-23 RX ORDER — ONDANSETRON 2 MG/ML
INJECTION INTRAMUSCULAR; INTRAVENOUS AS NEEDED
Status: DISCONTINUED | OUTPATIENT
Start: 2019-04-23 | End: 2019-04-23 | Stop reason: SURG

## 2019-04-23 RX ORDER — ONDANSETRON 2 MG/ML
4 INJECTION INTRAMUSCULAR; INTRAVENOUS EVERY 6 HOURS PRN
Status: DISCONTINUED | OUTPATIENT
Start: 2019-04-23 | End: 2019-04-23 | Stop reason: HOSPADM

## 2019-04-23 RX ORDER — ONDANSETRON 2 MG/ML
4 INJECTION INTRAMUSCULAR; INTRAVENOUS ONCE AS NEEDED
Status: DISCONTINUED | OUTPATIENT
Start: 2019-04-23 | End: 2019-04-23 | Stop reason: HOSPADM

## 2019-04-23 RX ORDER — MAGNESIUM HYDROXIDE 1200 MG/15ML
LIQUID ORAL AS NEEDED
Status: DISCONTINUED | OUTPATIENT
Start: 2019-04-23 | End: 2019-04-23 | Stop reason: HOSPADM

## 2019-04-23 RX ORDER — FENTANYL CITRATE/PF 50 MCG/ML
50 SYRINGE (ML) INJECTION
Status: DISCONTINUED | OUTPATIENT
Start: 2019-04-23 | End: 2019-04-23 | Stop reason: HOSPADM

## 2019-04-23 RX ORDER — IBUPROFEN 600 MG/1
600 TABLET ORAL EVERY 6 HOURS PRN
Status: DISCONTINUED | OUTPATIENT
Start: 2019-04-23 | End: 2019-04-23 | Stop reason: HOSPADM

## 2019-04-23 RX ORDER — HYDROMORPHONE HCL/PF 1 MG/ML
0.5 SYRINGE (ML) INJECTION
Status: DISCONTINUED | OUTPATIENT
Start: 2019-04-23 | End: 2019-04-23 | Stop reason: HOSPADM

## 2019-04-23 RX ORDER — MIDAZOLAM HYDROCHLORIDE 1 MG/ML
INJECTION INTRAMUSCULAR; INTRAVENOUS AS NEEDED
Status: DISCONTINUED | OUTPATIENT
Start: 2019-04-23 | End: 2019-04-23 | Stop reason: SURG

## 2019-04-23 RX ORDER — PROPOFOL 10 MG/ML
INJECTION, EMULSION INTRAVENOUS AS NEEDED
Status: DISCONTINUED | OUTPATIENT
Start: 2019-04-23 | End: 2019-04-23 | Stop reason: SURG

## 2019-04-23 RX ORDER — MEPERIDINE HYDROCHLORIDE 50 MG/ML
12.5 INJECTION INTRAMUSCULAR; INTRAVENOUS; SUBCUTANEOUS ONCE AS NEEDED
Status: DISCONTINUED | OUTPATIENT
Start: 2019-04-23 | End: 2019-04-23 | Stop reason: HOSPADM

## 2019-04-23 RX ORDER — KETOROLAC TROMETHAMINE 10 MG/1
10 TABLET, FILM COATED ORAL EVERY 6 HOURS PRN
Qty: 20 TABLET | Refills: 0 | Status: SHIPPED | OUTPATIENT
Start: 2019-04-23 | End: 2021-06-14

## 2019-04-23 RX ORDER — KETOROLAC TROMETHAMINE 30 MG/ML
INJECTION, SOLUTION INTRAMUSCULAR; INTRAVENOUS AS NEEDED
Status: DISCONTINUED | OUTPATIENT
Start: 2019-04-23 | End: 2019-04-23 | Stop reason: SURG

## 2019-04-23 RX ORDER — FENTANYL CITRATE 50 UG/ML
INJECTION, SOLUTION INTRAMUSCULAR; INTRAVENOUS AS NEEDED
Status: DISCONTINUED | OUTPATIENT
Start: 2019-04-23 | End: 2019-04-23 | Stop reason: SURG

## 2019-04-23 RX ORDER — OXYCODONE HYDROCHLORIDE AND ACETAMINOPHEN 5; 325 MG/1; MG/1
1 TABLET ORAL EVERY 4 HOURS PRN
Status: DISCONTINUED | OUTPATIENT
Start: 2019-04-23 | End: 2019-04-23 | Stop reason: HOSPADM

## 2019-04-23 RX ORDER — ACETAMINOPHEN 325 MG/1
650 TABLET ORAL EVERY 6 HOURS PRN
Status: DISCONTINUED | OUTPATIENT
Start: 2019-04-23 | End: 2019-04-23 | Stop reason: HOSPADM

## 2019-04-23 RX ORDER — SODIUM CHLORIDE 9 MG/ML
125 INJECTION, SOLUTION INTRAVENOUS CONTINUOUS
Status: DISCONTINUED | OUTPATIENT
Start: 2019-04-23 | End: 2019-04-23 | Stop reason: HOSPADM

## 2019-04-23 RX ADMIN — PROPOFOL 200 MG: 10 INJECTION, EMULSION INTRAVENOUS at 07:25

## 2019-04-23 RX ADMIN — KETOROLAC TROMETHAMINE 30 MG: 30 INJECTION, SOLUTION INTRAMUSCULAR; INTRAVENOUS at 07:50

## 2019-04-23 RX ADMIN — MIDAZOLAM 2 MG: 1 INJECTION INTRAMUSCULAR; INTRAVENOUS at 07:23

## 2019-04-23 RX ADMIN — FENTANYL CITRATE 50 MCG: 50 INJECTION, SOLUTION INTRAMUSCULAR; INTRAVENOUS at 07:50

## 2019-04-23 RX ADMIN — SODIUM CHLORIDE 125 ML/HR: 0.9 INJECTION, SOLUTION INTRAVENOUS at 06:06

## 2019-04-23 RX ADMIN — SODIUM CHLORIDE: 0.9 INJECTION, SOLUTION INTRAVENOUS at 07:55

## 2019-04-23 RX ADMIN — ONDANSETRON 4 MG: 2 INJECTION INTRAMUSCULAR; INTRAVENOUS at 07:34

## 2019-04-23 RX ADMIN — FENTANYL CITRATE 50 MCG: 50 INJECTION, SOLUTION INTRAMUSCULAR; INTRAVENOUS at 07:25

## 2019-05-02 ENCOUNTER — TELEPHONE (OUTPATIENT)
Dept: OBGYN CLINIC | Facility: CLINIC | Age: 39
End: 2019-05-02

## 2019-05-02 ENCOUNTER — OFFICE VISIT (OUTPATIENT)
Dept: OBGYN CLINIC | Facility: CLINIC | Age: 39
End: 2019-05-02
Payer: COMMERCIAL

## 2019-05-02 VITALS
HEIGHT: 64 IN | DIASTOLIC BLOOD PRESSURE: 78 MMHG | SYSTOLIC BLOOD PRESSURE: 122 MMHG | BODY MASS INDEX: 24.01 KG/M2 | WEIGHT: 140.6 LBS

## 2019-05-02 DIAGNOSIS — N92.0 MENORRHAGIA WITH REGULAR CYCLE: Primary | ICD-10-CM

## 2019-05-02 PROCEDURE — 99213 OFFICE O/P EST LOW 20 MIN: CPT | Performed by: OBSTETRICS & GYNECOLOGY

## 2019-05-15 ENCOUNTER — TELEPHONE (OUTPATIENT)
Dept: OBGYN CLINIC | Facility: CLINIC | Age: 39
End: 2019-05-15

## 2019-05-21 RX ORDER — VALACYCLOVIR HYDROCHLORIDE 500 MG/1
500 TABLET, FILM COATED ORAL DAILY
Qty: 90 TABLET | Refills: 0 | OUTPATIENT
Start: 2019-05-21 | End: 2019-08-19

## 2019-07-29 ENCOUNTER — DOCUMENTATION (OUTPATIENT)
Dept: OBGYN CLINIC | Facility: CLINIC | Age: 39
End: 2019-07-29

## 2019-07-29 ENCOUNTER — OFFICE VISIT (OUTPATIENT)
Dept: OBGYN CLINIC | Facility: CLINIC | Age: 39
End: 2019-07-29
Payer: COMMERCIAL

## 2019-07-29 VITALS
WEIGHT: 144 LBS | BODY MASS INDEX: 24.59 KG/M2 | SYSTOLIC BLOOD PRESSURE: 104 MMHG | DIASTOLIC BLOOD PRESSURE: 70 MMHG | HEIGHT: 64 IN

## 2019-07-29 DIAGNOSIS — N92.1 MENORRHAGIA WITH IRREGULAR CYCLE: Primary | ICD-10-CM

## 2019-07-29 PROCEDURE — 99213 OFFICE O/P EST LOW 20 MIN: CPT | Performed by: OBSTETRICS & GYNECOLOGY

## 2019-07-29 RX ORDER — VALACYCLOVIR HYDROCHLORIDE 1 G/1
1000 TABLET, FILM COATED ORAL DAILY
Refills: 12 | COMMUNITY
Start: 2019-07-10 | End: 2020-10-20 | Stop reason: SDUPTHER

## 2019-07-29 RX ORDER — TRANEXAMIC ACID 650 1/1
TABLET ORAL
Qty: 15 TABLET | Refills: 0 | Status: SHIPPED | OUTPATIENT
Start: 2019-07-29 | End: 2021-06-14

## 2019-07-29 NOTE — PROGRESS NOTES
Milo Maurice is seen today to discuss her abnormal bleeding that has occurred ever since her ablation procedure  Apparently she has been having very irregular and at times very heavy bleeding episodes  She does feel only slightly lightheaded today but is requesting something due to the fact that her bleeding over the past several days has increased dramatically  She did have a Carlita ablation  And normal endometrial sampling  At the time of hysteroscopy no abnormalities were seen  I discussed with Milo Maurice that I will start her on Lysteda to help with the current ongoing bleeding at this time  Once the bleeding has ceased, she will be transitioned to a progesterone only pill so as to further suppress the bleeding  With respect to the long-term, I did suggest to Milo Maurice that she try the progesterone medication for at least 6 months  If she continued bleeding through this or afterwards then a hysterectomy would be recommended

## 2019-07-29 NOTE — PROGRESS NOTES
Patient called on 7/28/19 with episode heavy bleeding  She is status post endometrial ablation April 2019  She states that she has episodes where blood just pours out of her  She has used about 10 pads over the day  She denies any lightheadedness, dizziness, orthostatic symptoms  Suggested rest and ibuprofen  If the bleeding persists, would recommend follow-up with Dr Juan R Estrada in the near future

## 2019-07-30 ENCOUNTER — HOSPITAL ENCOUNTER (EMERGENCY)
Facility: HOSPITAL | Age: 39
Discharge: HOME | End: 2019-07-30
Attending: EMERGENCY MEDICINE | Admitting: EMERGENCY MEDICINE
Payer: COMMERCIAL

## 2019-07-30 VITALS
SYSTOLIC BLOOD PRESSURE: 118 MMHG | RESPIRATION RATE: 18 BRPM | WEIGHT: 146.83 LBS | HEART RATE: 71 BPM | BODY MASS INDEX: 25.6 KG/M2 | DIASTOLIC BLOOD PRESSURE: 70 MMHG | OXYGEN SATURATION: 100 % | TEMPERATURE: 98.3 F

## 2019-07-30 DIAGNOSIS — S13.4XXA WHIPLASH INJURIES, INITIAL ENCOUNTER: ICD-10-CM

## 2019-07-30 DIAGNOSIS — S39.012A STRAIN OF LUMBAR REGION, INITIAL ENCOUNTER: ICD-10-CM
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
## 2019-07-30 PROCEDURE — 99283 EMERGENCY DEPT VISIT LOW MDM: CPT

## 2019-07-30 PROCEDURE — 99283 EMERGENCY DEPT VISIT LOW MDM: CPT | Performed by: NURSE PRACTITIONER

## 2019-07-30 RX ORDER — ACETAMINOPHEN 325 MG/1
975 TABLET ORAL ONCE
Status: COMPLETED | OUTPATIENT
Start: 2019-07-30 | End: 2019-07-30

## 2019-07-30 RX ORDER — METHOCARBAMOL 500 MG/1
500 TABLET, FILM COATED ORAL 3 TIMES DAILY PRN
Qty: 20 TABLET | Refills: 0 | Status: SHIPPED | OUTPATIENT
Start: 2019-07-30 | End: 2021-06-14

## 2019-07-30 RX ORDER — LIDOCAINE 50 MG/G
1 PATCH TOPICAL ONCE
Status: DISCONTINUED | OUTPATIENT
Start: 2019-07-30 | End: 2019-07-31 | Stop reason: HOSPADM

## 2019-07-30 RX ADMIN — ACETAMINOPHEN 975 MG: 325 TABLET ORAL at 23:21

## 2019-07-30 RX ADMIN — LIDOCAINE 1 PATCH: 50 PATCH TOPICAL at 23:22
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
## 2019-07-31 NOTE — DISCHARGE INSTRUCTIONS
You are to take tylenol for pain  Alternate ice and heat for pain  You may take the Robaxin for muscle spasms as needed   Use over the counter lidoderm patches for pain  Follow up with your PCP

## 2019-08-13 DIAGNOSIS — N92.0 MENORRHAGIA WITH REGULAR CYCLE: Primary | ICD-10-CM

## 2019-08-13 RX ORDER — ACETAMINOPHEN AND CODEINE PHOSPHATE 120; 12 MG/5ML; MG/5ML
1 SOLUTION ORAL DAILY
Qty: 84 TABLET | Refills: 0 | Status: SHIPPED | OUTPATIENT
Start: 2019-08-13 | End: 2019-10-15 | Stop reason: SDUPTHER

## 2019-08-13 NOTE — TELEPHONE ENCOUNTER
Patient called to report her bleeding stopped, was taking Lysteda  She now wants the Rx Progesterone pill that Dr Mckenzie Guerra discussed with her at her appointment

## 2019-10-15 ENCOUNTER — ANNUAL EXAM (OUTPATIENT)
Dept: OBGYN CLINIC | Facility: CLINIC | Age: 39
End: 2019-10-15
Payer: COMMERCIAL

## 2019-10-15 VITALS
SYSTOLIC BLOOD PRESSURE: 120 MMHG | BODY MASS INDEX: 25.61 KG/M2 | HEIGHT: 64 IN | DIASTOLIC BLOOD PRESSURE: 70 MMHG | WEIGHT: 150 LBS

## 2019-10-15 DIAGNOSIS — Z01.419 ENCOUNTER FOR GYNECOLOGICAL EXAMINATION WITHOUT ABNORMAL FINDING: Primary | ICD-10-CM

## 2019-10-15 DIAGNOSIS — N92.0 MENORRHAGIA WITH REGULAR CYCLE: ICD-10-CM

## 2019-10-15 DIAGNOSIS — N92.6 IRREGULAR BLEEDING: ICD-10-CM

## 2019-10-15 PROCEDURE — 99395 PREV VISIT EST AGE 18-39: CPT | Performed by: OBSTETRICS & GYNECOLOGY

## 2019-10-15 RX ORDER — ACETAMINOPHEN AND CODEINE PHOSPHATE 120; 12 MG/5ML; MG/5ML
1 SOLUTION ORAL DAILY
Qty: 84 TABLET | Refills: 3 | Status: SHIPPED | OUTPATIENT
Start: 2019-10-15 | End: 2020-09-14

## 2019-10-15 NOTE — PROGRESS NOTES
CC:  Annual exam    HPI: Jose E Boswell presents for routine yearly visit  She is doing well but continues to have abnormal uterine bleeding despite Micronor and this following at a failed uterine ablation  The patient states she is still having prolonged bleeding and we briefly discussed the virtue use of her proceeding with a supracervical hysterectomy at this time  Past Medical History:  Past Medical History:   Diagnosis Date    Abnormal Pap smear of cervix     Acne     Anxiety     ASCUS with positive high risk HPV cervical     Resolved: 10/23/2017    BRBPR (bright red blood per rectum)     Cancer (HCC)     pre-cancerous, cervical     Cellulitis of right foot 2017    Constipation     "significant"    Diverticulosis     Family hx of colon cancer     Paternal uncle    GERD (gastroesophageal reflux disease)     Herpes     Hirsutism     HPV (human papilloma virus) infection     Hx of bleeding disorder     DUB    Psychiatric disorder     anxiety    Urinary tract infection        Past Surgical History:  Past Surgical History:   Procedure Laterality Date    COLPOSCOPY      OVARIAN CYST REMOVAL      MS COLONOSCOPY FLX DX W/COLLJ SPEC WHEN PFRMD N/A 1/22/2019    Procedure: COLONOSCOPY with polypectomy;  Surgeon: Jerry Choudhary MD;  [facility]; Service: General    MS HYSTEROSCOPY,DX,SEP [address] [facility] N/A 4/23/2019    Procedure: HYSTEROSCOPY;  Surgeon: Lamar Garcia MD;  [facility];  Service: Gynecology    MS HYSTEROSCOPY,W/ENDOMETRIAL ABLATION N/A 4/23/2019    Procedure: ABLATION ENDOMETRIAL;  Surgeon: Lamar Garcia MD;  [facility];  Service: Gynecology    TUBAL LIGATION      WISDOM TOOTH EXTRACTION         Past OB/Gyn History:    Menstrual cycles as discussed under the HPI  Ann Love Denies any history of sexually transmitted infection  No history of abnormal pap smears   Her last pap smear was  2018 and normal     ALLERGIES:   Allergies   Allergen Reactions    Cephalosporins Hives       MEDS:   Current Outpatient Medications:     benzoyl peroxide-erythromycin (BENZAMYCIN) gel    docusate sodium (COLACE) 100 mg capsule    famotidine (PEPCID) 40 MG tablet    Multiple Vitamins-Minerals (MULTIVITAMIN GUMMIES ADULT PO)    norethindrone (MICRONOR) 0 35 MG tablet    polyethylene glycol (GLYCOLAX) powder    valACYclovir (VALTREX) 1,000 mg tablet    hydrocortisone (WESTCORT) 0 2 % cream    ketorolac (TORADOL) 10 mg tablet    methocarbamol (ROBAXIN) 500 mg tablet    Tranexamic Acid 650 MG TABS    valACYclovir (VALTREX) 1,000 mg tablet    Family History:  Family History   Problem Relation Age of Onset    Hyperlipidemia Mother     Hypertension Mother     Diabetes Mother     No Known Problems Father     Breast cancer Paternal Grandmother     Alzheimer's disease Paternal Grandfather     Colon cancer Paternal Uncle        Social History:  Social History     Socioeconomic History    Marital status:      Spouse name: Not on file    Number of children: 3    Years of education: Not on file    Highest education level: Not on file   Occupational History    Not on file   Social Needs    Financial resource strain: Not on file    Food insecurity:     Worry: Not on file     Inability: Not on file    Transportation needs:     Medical: Not on file     Non-medical: Not on file   Tobacco Use    Smoking status: Never Smoker    Smokeless tobacco: Never Used   Substance and Sexual Activity    Alcohol use: Yes     Frequency: Monthly or less     Drinks per session: 1 or 2    Drug use: No    Sexual activity: Yes     Partners: Male   Lifestyle    Physical activity:     Days per week: Not on file     Minutes per session: Not on file    Stress: Not on file   Relationships    Social connections:     Talks on phone: Not on file     Gets together: Not on file     Attends Religion service: Not on file     Active member of club or organization: Not on file     Attends meetings of clubs or organizations: Not on file     Relationship status: Not on file    Intimate partner violence:     Fear of current or ex partner: Not on file     Emotionally abused: Not on file     Physically abused: Not on file     Forced sexual activity: Not on file   Other Topics Concern    Not on file   Social History Narrative    Caffeine Use    Uses Safety Equipment-seatbelts         Review of Systems:  Gen:   Denies fatigue, chills, nausea, vomiting, fever  Skin: No rashes or discolorations of any concern  RESP: Denies SOB, no cough  CV: Denies chest pain or palpitations  Breasts: Denies masses, pain, skin changes and nipple discharge  GI: Denies abdominal pain, heartburn, nausea, vomiting, changes in bowel habits  : Denies dysuria, frequency, CVA tenderness, incontinence and hematuria  Genitalia: Denies abnormal vaginal discharge, external lesions, rashes, pelvic pain, pressure, abnormal bleeding  Rectal:  Denies pain, bleeding, hemorrhoids,    Physical Exam:  /70 (BP Location: Left arm, Patient Position: Sitting, Cuff Size: Standard)   Ht 5' 3 5" (1 613 m)   Wt 68 kg (150 lb)   LMP 10/03/2019   BMI 26 15 kg/m²    Gen: The patient was alert and oriented x3, pleasant well-appearing female in no acute distress  Neck:  Unremarkable, no lymphadenopathy, no thyromegaly, or tenderness  Breasts: Symmetric  No dominant, discrete, fixed  or suspicious masses are noted  No skin or nipple changes  No palpable axillary nodes  No supraclavicular adenopathy  Abd:  Soft, nontender, nondistended, no masses or organomegaly  Back:  No CVA tenderness, no tenderness to palpation along spine  Pelvic  Normal appearing external female genitalia, no visible lesions, no rashes  Vagina is free of discharge, normal vaginal epithelium, no abnormal  lesions, no evidence of prolapse anteriorly or posteriorly  Normal appearing cervix, mobile and nontender  A thin prep pap smear was not obtained     Uterus is normal size, mobile and, nontender  No palpable adnexal masses or tenderness  No anoperineal lesions  Skin:  No concerning lesions  Extremeties: No edema      Assessment & Plan:   1  Routine annual exam      RTO one year orPRN  2   Continued heavy menstrual bleeding, patient wishes to continue Micronor for an additional 1-3 months pending her tolerance level  Patient knows she can call at any time to come in and schedule hysterectomy procedure

## 2020-03-20 ENCOUNTER — OFFICE VISIT (OUTPATIENT)
Dept: FAMILY MEDICINE CLINIC | Facility: CLINIC | Age: 40
End: 2020-03-20
Payer: COMMERCIAL

## 2020-03-20 VITALS
WEIGHT: 155.13 LBS | SYSTOLIC BLOOD PRESSURE: 114 MMHG | DIASTOLIC BLOOD PRESSURE: 80 MMHG | BODY MASS INDEX: 26.49 KG/M2 | HEIGHT: 64 IN

## 2020-03-20 DIAGNOSIS — M25.552 ACUTE HIP PAIN, LEFT: ICD-10-CM

## 2020-03-20 DIAGNOSIS — M67.432 GANGLION CYST OF DORSUM OF LEFT WRIST: Primary | ICD-10-CM

## 2020-03-20 DIAGNOSIS — D22.9 ATYPICAL NEVI: ICD-10-CM

## 2020-03-20 PROCEDURE — 3008F BODY MASS INDEX DOCD: CPT | Performed by: FAMILY MEDICINE

## 2020-03-20 PROCEDURE — 1036F TOBACCO NON-USER: CPT | Performed by: FAMILY MEDICINE

## 2020-03-20 PROCEDURE — 99214 OFFICE O/P EST MOD 30 MIN: CPT | Performed by: FAMILY MEDICINE

## 2020-03-20 RX ORDER — NAPROXEN 500 MG/1
TABLET ORAL
Qty: 30 TABLET | Refills: 0 | Status: SHIPPED | OUTPATIENT
Start: 2020-03-20 | End: 2021-06-14

## 2020-03-20 NOTE — PATIENT INSTRUCTIONS
Ganglion Cysts   WHAT YOU NEED TO KNOW:   What is a ganglion cyst?  A ganglion cyst is an abnormal buildup of fluid under the skin  They are most common on the wrists, feet, or ankles, but can be found anywhere on the body  The cause is not known  You may have a higher risk for a ganglion cyst if you injure your joint  What are the signs and symptoms of a ganglion cyst?   · A round, firm lump    · A lump that changes size and may disappear or reappear    · Numbness, swelling, or muscle weakness around the joint where you have the cyst    · Pain in a joint that has the cyst  How is a ganglion cyst diagnosed? Your healthcare provider will examine the cyst and ask how long you have had it  He will move your joint around to feel the cyst and see if it moves  Tell him if the cyst interferes with your daily activities  You may need any of the following:  · An ultrasound  uses sound waves to show pictures of your cyst on a monitor  · An MRI  takes pictures of your joint to show the cyst  You may be given dye to help the joint show up better  Tell the healthcare provider if you have ever had an allergic reaction to contrast dye  Do not enter the MRI room with anything metal  Metal can cause serious injury  Tell the healthcare provider if you have any metal in or on your body  How is a ganglion cyst treated? A ganglion cyst will usually go away on its own  Do not try to pop or break the cyst yourself  This can cause it to return  You may need any of the following:  · Steroid medicine  may be injected into the cyst to decrease inflammation  · Aspiration  may be done to drain the cyst with a needle  · Surgery  may be needed to remove the cyst   How can I manage my symptoms? · Go to hand therapy  A hand therapist teaches you exercises to help improve movement and strength, and to decrease pain      · Wear a splint as directed  to support and protect the joint that has the cyst  This will limit movement and help your cyst get smaller  · Care for your wound after aspiration or surgery  Care for the cyst area as directed  Rest your joint for 48 hours  Place ice on your wound for 15 to 20 minutes every hour or as directed  Use an ice pack, or put crushed ice in a plastic bag  Cover it with a towel  Ice helps prevent tissue damage and decreases swelling and pain  When should I contact my healthcare provider? · You continue to have pain, even after treatment  · Your cyst returns or gets larger  · Your limb that has the cyst gets weak, numb, stiff, or unstable  · You have questions or concerns about your condition or care  CARE AGREEMENT:   You have the right to help plan your care  Learn about your health condition and how it may be treated  Discuss treatment options with your caregivers to decide what care you want to receive  You always have the right to refuse treatment  The above information is an  only  It is not intended as medical advice for individual conditions or treatments  Talk to your doctor, nurse or pharmacist before following any medical regimen to see if it is safe and effective for you  © 2017 [facility] Information is for End User's use only and may not be sold, Hip Sprain, Ambulatory Care   GENERAL INFORMATION:   A hip sprain  is when a ligament in your hip is stretched or torn  Ligaments (tough tissue that connects bones) surround your hip and hold it in place  Seek immediate care for the following symptoms:   · Trouble standing on the leg on your injured side    · New or increased stiffness or trouble moving your injured hip    · New or increased numbness in the leg on your injured side    · Increased swelling and pain in your hip    · Bluish or pale skin on the leg on your injured side  Treatment for a hip sprain  may include any of the following:  · NSAIDs  help decrease swelling and pain or fever   This medicine is available with or without a doctor's order  NSAIDs can cause stomach bleeding or kidney problems in certain people  If you take blood thinner medicine, always ask your healthcare provider if NSAIDs are safe for you  Always read the medicine label and follow directions  · Hip exercises  help decrease stiffness  Your healthcare provider may want you to start hip exercises after you rest your hip for about 3 days  He may also have you start physical therapy  A physical therapist teaches you light exercises to help decrease pain and swelling and improve hip movement  Once you are able to move your hip without pain, you will be taught exercises to improve your strength  If you have a severe sprain, you may need to wait 1 to 3 weeks before you exercise your hip  Manage your hip sprain:   · Rest your hip for 2 to 3 days after your injury  This will help decrease the risk of more damage to your hip  · Apply ice on your hip for 15 to 20 minutes every hour or as directed  Use an ice pack, or put crushed ice in a plastic bag  Cover it with a towel  Ice helps prevent tissue damage and decreases swelling and pain  You may need to apply ice to your hip at least 4 to 8 times each day  Prevent another hip sprain:   · Do not exercise when you feel pain or you are tired  · Exercise daily  Make sure you warm up and stretch before you exercise  · Wear equipment to protect yourself when you play sports  · Wear shoes that fit well to decrease your risk for falls  · Stop exercising and playing sports if your symptoms from a past injury return  Follow up with your healthcare provider as directed:  Write down your questions so you remember to ask them during your visits  CARE AGREEMENT:   You have the right to help plan your care  Learn about your health condition and how it may be treated  Discuss treatment options with your caregivers to decide what care you want to receive  You always have the right to refuse treatment   The above information is an  only  It is not intended as medical advice for individual conditions or treatments  Talk to your doctor, nurse or pharmacist before following any medical regimen to see if it is safe and effective for you  © 2014 [address] is for End User's use only and may not be sold, redistributed or otherwise used for commercial purposes  All illustrations and images included in CareNotes® are the copyrighted property of A D A M , Inc  or [employer]  redistributed or otherwise used for commercial purposes  All illustrations and images included in CareNotes® are the copyrighted property of A D A M , Inc  or [employer]

## 2020-03-20 NOTE — PROGRESS NOTES
Assessment/Plan:    Ganglion cyst of dorsum of left wrist  otho referral Patient to use ACE wrap    Atypical nevi  Monitor If things change notify me Otherwise I will recheck it in 3 months    Acute hip pain, left  Naprosyn 500mg BID for 10 days with food exercises given       Diagnoses and all orders for this visit:    Ganglion cyst of dorsum of left wrist    Acute hip pain, left    Atypical nevi          Subjective:   Chief Complaint   Patient presents with    Mass     left wrist    Skin Lesion     left thigh        Patient ID: Candice Hernández is a 42 y o  female  Patient is here with multiple problms Patient has a lump on her left wrist for last 3 months It is on the dorsal side It is non tender It is freely mobile She has a lesion on her left thigh for about 6 months About 3 months ago it scabbed over and then became a slightly different color It has not grown in size or changed shape Patient has had hip pain on the left since 2/29/2020 Patient has taken nothing for hte pain Patient pain is about 3-4 out of 10 Patient ntoes it is worse with movement Patient does no exercise       The following portions of the patient's history were reviewed and updated as appropriate: allergies, current medications, past medical history, past social history and problem list     Review of Systems   Constitutional: Negative for activity change and appetite change  Eyes: Negative  Musculoskeletal:        Left hip pain and left wrist lesion   Skin:        Skin lesion per HPI   Psychiatric/Behavioral: Negative for decreased concentration, dysphoric mood and suicidal ideas  The patient is not nervous/anxious  Objective:      /80   Ht 5' 3 5" (1 613 m)   Wt 70 4 kg (155 lb 2 oz)   BMI 27 05 kg/m²          Physical Exam   Constitutional: She is oriented to person, place, and time  She appears well-developed and well-nourished  HENT:   Head: Normocephalic and atraumatic     Right Ear: External ear normal  Left Ear: External ear normal    Nose: Nose normal    Mouth/Throat: Oropharynx is clear and moist    Eyes: Pupils are equal, round, and reactive to light  EOM are normal    Cardiovascular: Normal rate and regular rhythm  Pulmonary/Chest: Effort normal and breath sounds normal    Abdominal: Soft  Bowel sounds are normal    Musculoskeletal:   Left wrist nodule is mobile non tender and is about 1 cm in size Left hip pain to palpation lateral bursa of hip Full wROM of the hip and normal strenght   Neurological: She is alert and oriented to person, place, and time  Skin:   2 5 mm lesion on the anterior thigh Non crusted non tender   Psychiatric: She has a normal mood and affect  Her behavior is normal  Judgment and thought content normal    Nursing note and vitals reviewed

## 2020-04-27 DIAGNOSIS — A60.04 HERPES SIMPLEX VULVOVAGINITIS: ICD-10-CM

## 2020-04-27 RX ORDER — VALACYCLOVIR HYDROCHLORIDE 1 G/1
1000 TABLET, FILM COATED ORAL DAILY
Qty: 90 TABLET | Refills: 2 | Status: SHIPPED | OUTPATIENT
Start: 2020-04-27 | End: 2020-05-27

## 2020-09-11 DIAGNOSIS — N92.0 MENORRHAGIA WITH REGULAR CYCLE: ICD-10-CM

## 2020-09-14 RX ORDER — ACETAMINOPHEN AND CODEINE PHOSPHATE 120; 12 MG/5ML; MG/5ML
SOLUTION ORAL
Qty: 84 TABLET | Refills: 0 | Status: SHIPPED | OUTPATIENT
Start: 2020-09-14 | End: 2020-10-20 | Stop reason: SDUPTHER

## 2020-10-20 ENCOUNTER — ANNUAL EXAM (OUTPATIENT)
Dept: OBGYN CLINIC | Facility: CLINIC | Age: 40
End: 2020-10-20
Payer: COMMERCIAL

## 2020-10-20 VITALS
WEIGHT: 162 LBS | SYSTOLIC BLOOD PRESSURE: 120 MMHG | BODY MASS INDEX: 27.66 KG/M2 | DIASTOLIC BLOOD PRESSURE: 80 MMHG | TEMPERATURE: 97.8 F | HEIGHT: 64 IN

## 2020-10-20 DIAGNOSIS — Z01.419 ENCOUNTER FOR GYNECOLOGICAL EXAMINATION WITHOUT ABNORMAL FINDING: Primary | ICD-10-CM

## 2020-10-20 DIAGNOSIS — A60.04 HERPES SIMPLEX VULVOVAGINITIS: ICD-10-CM

## 2020-10-20 DIAGNOSIS — N92.0 MENORRHAGIA WITH REGULAR CYCLE: ICD-10-CM

## 2020-10-20 PROCEDURE — 99395 PREV VISIT EST AGE 18-39: CPT | Performed by: OBSTETRICS & GYNECOLOGY

## 2020-10-20 PROCEDURE — 1036F TOBACCO NON-USER: CPT | Performed by: OBSTETRICS & GYNECOLOGY

## 2020-10-20 RX ORDER — ACETAMINOPHEN AND CODEINE PHOSPHATE 120; 12 MG/5ML; MG/5ML
1 SOLUTION ORAL DAILY
Qty: 84 TABLET | Refills: 3 | Status: SHIPPED | OUTPATIENT
Start: 2020-10-20 | End: 2021-10-25 | Stop reason: SDUPTHER

## 2020-10-20 RX ORDER — VALACYCLOVIR HYDROCHLORIDE 1 G/1
1000 TABLET, FILM COATED ORAL DAILY
Qty: 90 TABLET | Refills: 3 | Status: SHIPPED | OUTPATIENT
Start: 2020-10-20 | End: 2021-10-25 | Stop reason: SDUPTHER

## 2020-11-17 ENCOUNTER — OFFICE VISIT (OUTPATIENT)
Dept: FAMILY MEDICINE CLINIC | Facility: CLINIC | Age: 40
End: 2020-11-17
Payer: COMMERCIAL

## 2020-11-17 VITALS
BODY MASS INDEX: 27.49 KG/M2 | HEIGHT: 64 IN | TEMPERATURE: 96.2 F | SYSTOLIC BLOOD PRESSURE: 120 MMHG | DIASTOLIC BLOOD PRESSURE: 70 MMHG | WEIGHT: 161 LBS

## 2020-11-17 DIAGNOSIS — M25.552 CHRONIC HIP PAIN, LEFT: ICD-10-CM

## 2020-11-17 DIAGNOSIS — L81.4 SOLAR LENTIGO: ICD-10-CM

## 2020-11-17 DIAGNOSIS — G89.29 CHRONIC HIP PAIN, LEFT: ICD-10-CM

## 2020-11-17 DIAGNOSIS — M25.552 ACUTE HIP PAIN, LEFT: ICD-10-CM

## 2020-11-17 DIAGNOSIS — L65.9 ALOPECIA: ICD-10-CM

## 2020-11-17 DIAGNOSIS — D22.9 ATYPICAL NEVI: Primary | ICD-10-CM

## 2020-11-17 PROCEDURE — 99214 OFFICE O/P EST MOD 30 MIN: CPT | Performed by: FAMILY MEDICINE

## 2020-11-17 PROCEDURE — 3008F BODY MASS INDEX DOCD: CPT | Performed by: OBSTETRICS & GYNECOLOGY

## 2021-06-14 ENCOUNTER — OFFICE VISIT (OUTPATIENT)
Dept: URGENT CARE | Facility: MEDICAL CENTER | Age: 41
End: 2021-06-14
Payer: COMMERCIAL

## 2021-06-14 VITALS
DIASTOLIC BLOOD PRESSURE: 80 MMHG | HEART RATE: 79 BPM | TEMPERATURE: 97.2 F | BODY MASS INDEX: 28.35 KG/M2 | RESPIRATION RATE: 16 BRPM | WEIGHT: 160 LBS | OXYGEN SATURATION: 99 % | SYSTOLIC BLOOD PRESSURE: 120 MMHG | HEIGHT: 63 IN

## 2021-06-14 DIAGNOSIS — N39.0 URINARY TRACT INFECTION WITH HEMATURIA, SITE UNSPECIFIED: Primary | ICD-10-CM

## 2021-06-14 DIAGNOSIS — R31.9 URINARY TRACT INFECTION WITH HEMATURIA, SITE UNSPECIFIED: Primary | ICD-10-CM

## 2021-06-14 LAB
SL AMB  POCT GLUCOSE, UA: NEGATIVE
SL AMB LEUKOCYTE ESTERASE,UA: ABNORMAL
SL AMB POCT BILIRUBIN,UA: NEGATIVE
SL AMB POCT BLOOD,UA: ABNORMAL
SL AMB POCT CLARITY,UA: ABNORMAL
SL AMB POCT COLOR,UA: ABNORMAL
SL AMB POCT KETONES,UA: NEGATIVE
SL AMB POCT NITRITE,UA: NEGATIVE
SL AMB POCT PH,UA: 5
SL AMB POCT SPECIFIC GRAVITY,UA: 1.02
SL AMB POCT URINE HCG: NEGATIVE
SL AMB POCT URINE PROTEIN: ABNORMAL
SL AMB POCT UROBILINOGEN: NORMAL

## 2021-06-14 PROCEDURE — 81002 URINALYSIS NONAUTO W/O SCOPE: CPT | Performed by: PHYSICIAN ASSISTANT

## 2021-06-14 PROCEDURE — 87086 URINE CULTURE/COLONY COUNT: CPT | Performed by: PHYSICIAN ASSISTANT

## 2021-06-14 PROCEDURE — 99213 OFFICE O/P EST LOW 20 MIN: CPT | Performed by: PHYSICIAN ASSISTANT

## 2021-06-14 PROCEDURE — 81025 URINE PREGNANCY TEST: CPT | Performed by: PHYSICIAN ASSISTANT

## 2021-06-14 PROCEDURE — 87186 SC STD MICRODIL/AGAR DIL: CPT | Performed by: PHYSICIAN ASSISTANT

## 2021-06-14 PROCEDURE — 87077 CULTURE AEROBIC IDENTIFY: CPT | Performed by: PHYSICIAN ASSISTANT

## 2021-06-14 RX ORDER — NITROFURANTOIN 25; 75 MG/1; MG/1
100 CAPSULE ORAL 2 TIMES DAILY
Qty: 14 CAPSULE | Refills: 0 | Status: SHIPPED | OUTPATIENT
Start: 2021-06-14 | End: 2021-06-21

## 2021-06-14 RX ORDER — CLINDAMYCIN PHOSPHATE 10 MG/ML
SOLUTION TOPICAL
COMMUNITY
Start: 2021-05-26

## 2021-06-14 NOTE — PROGRESS NOTES
[facility]        NAME: Marisa Foley is a 47 y o  female  : 1980    MRN: [id number]  DATE: 2021  TIME: 8:47 AM    /80   Pulse 79   Temp (!) 97 2 °F (36 2 °C)   Resp 16   Ht 5' 3" (1 6 m)   Wt 72 6 kg (160 lb)   LMP 2021 (Approximate)   SpO2 99%   BMI 28 34 kg/m²     Assessment and Plan   Urinary tract infection with hematuria, site unspecified [N39 0, R31 9]  1  Urinary tract infection with hematuria, site unspecified  POCT urine HCG    POCT urine dip    Urine culture    nitrofurantoin (MACROBID) 100 mg capsule         Patient Instructions       Follow up with PCP in 3-5 days  Proceed to  ER if symptoms worsen  Chief Complaint     Chief Complaint   Patient presents with    Possible UTI     Pt c/o dysuria since last night  History of Present Illness       Pt with urine frequency and burning since last payal       Review of Systems   Review of Systems   Constitutional: Negative  HENT: Negative  Eyes: Negative  Respiratory: Negative  Cardiovascular: Negative  Gastrointestinal: Negative  Endocrine: Negative  Genitourinary: Positive for dysuria and frequency  Musculoskeletal: Negative  Skin: Negative  Allergic/Immunologic: Negative  Neurological: Negative  Hematological: Negative  Psychiatric/Behavioral: Negative  All other systems reviewed and are negative          Current Medications       Current Outpatient Medications:     Ivermectin (SOOLANTRA EX), Apply topically, Disp: , Rfl:     clindamycin (CLEOCIN T) 1 %, APPLY TO AREAS OF ACNE ONCE DAILY AS NEEDED, Disp: , Rfl:     docusate sodium (COLACE) 100 mg capsule, TAKE 1 CAPSULE BY MOUTH TWICE A DAY, Disp: 30 capsule, Rfl: 0    famotidine (PEPCID) 40 MG tablet, TAKE 1 TABLET PRIOR TO BREAKFAST AND PRIOR TO DINNER, Disp: , Rfl: 5    hydrocortisone (WESTCORT) 0 2 % cream, Apply topically 2 (two) times a day (Patient not taking: Reported on 2020), Disp: 45 g, Rfl: 0    Multiple Vitamins-Minerals (MULTIVITAMIN GUMMIES ADULT PO), Take 2 tablets by mouth daily, Disp: , Rfl:     nitrofurantoin (MACROBID) 100 mg capsule, Take 1 capsule (100 mg total) by mouth 2 (two) times a day for 7 days, Disp: 14 capsule, Rfl: 0    norethindrone (MICRONOR) 0 35 MG tablet, Take 1 tablet (0 35 mg total) by mouth daily, Disp: 84 tablet, Rfl: 3    polyethylene glycol (GLYCOLAX) powder, 2 tablespoons in 8 oz water daily, Disp: 500 g, Rfl: 1    valACYclovir (VALTREX) 1,000 mg tablet, Take 1 tablet (1,000 mg total) by mouth daily, Disp: 90 tablet, Rfl: 3    Current Allergies     Allergies as of 06/14/2021 - Reviewed 06/14/2021   Allergen Reaction Noted    Cephalosporins Hives 12/19/2017            The following portions of the patient's history were reviewed and updated as appropriate: allergies, current medications, past family history, past medical history, past social history, past surgical history and problem list      Past Medical History:   Diagnosis Date    Abnormal Pap smear of cervix     Acne     Anxiety     ASCUS with positive high risk HPV cervical     Resolved: 10/23/2017    BRBPR (bright red blood per rectum)     Cancer (HCC)     pre-cancerous, cervical     Cellulitis of right foot 2017    Constipation     "significant"    Diverticulosis     Family hx of colon cancer     Paternal uncle    GERD (gastroesophageal reflux disease)     Herpes     Hirsutism     HPV (human papilloma virus) infection     Hx of bleeding disorder     DUB    Psychiatric disorder     anxiety    Urinary tract infection        Past Surgical History:   Procedure Laterality Date    COLPOSCOPY      OVARIAN CYST REMOVAL      MI COLONOSCOPY FLX DX W/COLLJ SPEC WHEN PFRMD N/A 1/22/2019    Procedure: COLONOSCOPY with polypectomy;  Surgeon: Buddy Mckeon MD;  Location: [facility];   Service: General    MI HYSTEROSCOPY,DX,SEP [employer] N/A 4/23/2019    Procedure: HYSTEROSCOPY;  Surgeon: Davey Green MD; Location: [facility];  Service: Gynecology    RI HYSTEROSCOPY,W/ENDOMETRIAL ABLATION N/A 4/23/2019    Procedure: ABLATION ENDOMETRIAL;  Surgeon: Ismael Baltazar MD;  Location: [facility];  Service: Gynecology    TUBAL LIGATION      WISDOM TOOTH EXTRACTION         Family History   Problem Relation Age of Onset    Hyperlipidemia Mother     Hypertension Mother     Diabetes Mother     No Known Problems Father     Breast cancer Paternal Grandmother     Alzheimer's disease Paternal Grandfather     Colon cancer Paternal Uncle          Medications have been verified  Objective   /80   Pulse 79   Temp (!) 97 2 °F (36 2 °C)   Resp 16   Ht 5' 3" (1 6 m)   Wt 72 6 kg (160 lb)   LMP 04/14/2021 (Approximate)   SpO2 99%   BMI 28 34 kg/m²        Physical Exam     Physical Exam  Vitals and nursing note reviewed  Constitutional:       Appearance: Normal appearance  She is normal weight  HENT:      Head: Normocephalic and atraumatic  Right Ear: Tympanic membrane, ear canal and external ear normal       Left Ear: Tympanic membrane, ear canal and external ear normal       Nose: Nose normal       Mouth/Throat:      Mouth: Mucous membranes are moist       Pharynx: Oropharynx is clear  Eyes:      Extraocular Movements: Extraocular movements intact  Conjunctiva/sclera: Conjunctivae normal       Pupils: Pupils are equal, round, and reactive to light  Cardiovascular:      Rate and Rhythm: Normal rate and regular rhythm  Pulses: Normal pulses  Heart sounds: Normal heart sounds  Pulmonary:      Effort: Pulmonary effort is normal       Breath sounds: Normal breath sounds  Abdominal:      General: Abdomen is flat  Bowel sounds are normal       Palpations: Abdomen is soft  Musculoskeletal:         General: Normal range of motion  Cervical back: Normal range of motion and neck supple  Skin:     General: Skin is warm        Capillary Refill: Capillary refill takes less than 2 seconds  Neurological:      General: No focal deficit present  Mental Status: She is alert and oriented to person, place, and time     Psychiatric:         Mood and Affect: Mood normal

## 2021-06-14 NOTE — PATIENT INSTRUCTIONS
Urinary Tract Infection in Older Adults   WHAT YOU NEED TO KNOW:   A urinary tract infection (UTI) is caused by bacteria that get inside your urinary tract  Your urinary tract includes your kidneys, ureters, bladder, and urethra  Urine is made in your kidneys, and it flows from the ureters to the bladder  Urine leaves the bladder through the urethra  A UTI is more common in your lower urinary tract, which includes your bladder and urethra  DISCHARGE INSTRUCTIONS:   Return to the emergency department if:   · You are urinating very little or not at all  · You are vomiting  · You have a high fever with shaking chills  · You have side or back pain that gets worse  Call your doctor if:   · You have a fever  · You are a woman and you have increased white or yellow discharge from your vagina  · You do not feel better after 2 days of taking antibiotics  · You have questions or concerns about your condition or care  Medicines:   · Medicines  help treat the bacterial infection or decrease pain and burning when you urinate  You may also need medicines to decrease the urge to urinate often  If you have UTIs often (called recurrent UTIs), you may be given antibiotics to take regularly  You will be given directions for when and how to use antibiotics  The goal is to prevent UTIs but not cause antibiotic resistance by using antibiotics too often  · Take your medicine as directed  Contact your healthcare provider if you think your medicine is not helping or if you have side effects  Tell him or her if you are allergic to any medicine  Keep a list of the medicines, vitamins, and herbs you take  Include the amounts, and when and why you take them  Bring the list or the pill bottles to follow-up visits  Carry your medicine list with you in case of an emergency  Self-care:   · Drink liquids as directed  Liquids can help flush bacteria from your urinary tract   Ask how much liquid to drink each day and which liquids are best for you  You may need to drink more liquids than usual to help flush out the bacteria  Do not drink alcohol, caffeine, and citrus juices  These can irritate your bladder and increase your symptoms  · Apply heat  on your abdomen for 20 to 30 minutes every 2 hours for as many days as directed  Heat helps decrease discomfort and pressure in your bladder  Prevent a UTI:   · Urinate when you feel the urge  Do not hold your urine  Bacteria can grow if urine stays in the bladder too long  It may be helpful to urinate at least every 3 to 4 hours  · Urinate after you have sex  to flush away bacteria that can enter your urinary tract during sex  · Wear cotton underwear and clothes that are loose  Tight pants and nylon underwear can trap moisture and cause bacteria to grow  · Cranberry juice or cranberry supplements  may help prevent UTIs  Your healthcare provider can recommend the right juice or supplement for you  · Women should wipe front to back  after urinating or having a bowel movement  This may prevent germs from getting into the urinary tract  Do not douche or use feminine deodorants  These can change the chemical balance in your vagina  You may also be given vaginal estrogen medicine  This medicine helps prevent recurrent UTIs in women who have gone through menopause or are in karson-menopause  Follow up with your healthcare provider as directed:  Write down your questions so you remember to ask them during your visits  © Copyright [address] Information is for End User's use only and may not be sold, redistributed or otherwise used for commercial purposes  All illustrations and images included in CareNotes® are the copyrighted property of A D A M , Inc  or [facility] [place] Giulia Bauer   The above information is an  only  It is not intended as medical advice for individual conditions or treatments   Talk to your doctor, nurse or pharmacist before following any medical regimen to see if it is safe and effective for you

## 2021-06-16 LAB
BACTERIA UR CULT: ABNORMAL
BACTERIA UR CULT: ABNORMAL

## 2021-09-23 ENCOUNTER — OFFICE VISIT (OUTPATIENT)
Dept: FAMILY MEDICINE CLINIC | Facility: CLINIC | Age: 41
End: 2021-09-23
Payer: COMMERCIAL

## 2021-09-23 VITALS
SYSTOLIC BLOOD PRESSURE: 120 MMHG | DIASTOLIC BLOOD PRESSURE: 78 MMHG | HEIGHT: 63 IN | WEIGHT: 164.2 LBS | TEMPERATURE: 97.9 F | BODY MASS INDEX: 29.09 KG/M2

## 2021-09-23 DIAGNOSIS — N30.00 ACUTE CYSTITIS WITHOUT HEMATURIA: Primary | ICD-10-CM

## 2021-09-23 PROCEDURE — 99213 OFFICE O/P EST LOW 20 MIN: CPT | Performed by: FAMILY MEDICINE

## 2021-09-23 PROCEDURE — 3008F BODY MASS INDEX DOCD: CPT | Performed by: FAMILY MEDICINE

## 2021-09-23 PROCEDURE — 1036F TOBACCO NON-USER: CPT | Performed by: FAMILY MEDICINE

## 2021-09-23 RX ORDER — NITROFURANTOIN 25; 75 MG/1; MG/1
100 CAPSULE ORAL 2 TIMES DAILY
Qty: 10 CAPSULE | Refills: 0 | Status: SHIPPED | OUTPATIENT
Start: 2021-09-23 | End: 2021-09-28

## 2021-09-23 NOTE — PROGRESS NOTES
Assessment/Plan:    No problem-specific Assessment & Plan notes found for this encounter  {Assess/PlanSmartLinks:50604}      Subjective:   Chief Complaint   Patient presents with    Urinary Frequency          Patient ID: Lucius Hightower is a 46 y o  female      HPI    {Common ambulatory SmartLinks:05577}    Review of Systems      Objective:      /78   Temp 97 9 °F (36 6 °C)   Ht 5' 3" (1 6 m)   Wt 74 5 kg (164 lb 3 2 oz)   BMI 29 09 kg/m²          Physical Exam

## 2021-09-23 NOTE — PROGRESS NOTES
Assessment/Plan:    Acute cystitis without hematuria  Antibiotic as directed urine culture sent       Diagnoses and all orders for this visit:    Acute cystitis without hematuria  -     nitrofurantoin (MACROBID) 100 mg capsule; Take 1 capsule (100 mg total) by mouth 2 (two) times a day for 5 days  -     Urine culture          Subjective:   Chief Complaint   Patient presents with    Urinary Frequency     pt has taken AZO          Patient ID: Christiano Landa is a 46 y o  female  FDLMP was 8/20/2021  Urinary Frequency   This is a new problem  The current episode started yesterday  The problem occurs every urination  The problem has been gradually worsening  The quality of the pain is described as burning  The pain is at a severity of 5/10  The pain is moderate  There has been no fever  She is sexually active  There is no history of pyelonephritis  Associated symptoms include frequency and urgency  Pertinent negatives include no chills, discharge, flank pain, hematuria, hesitancy, nausea, possible pregnancy, sweats or vomiting  She has tried increased fluids for the symptoms  The treatment provided no relief  There is no history of catheterization, kidney stones, recurrent UTIs, a single kidney, urinary stasis or a urological procedure  The following portions of the patient's history were reviewed and updated as appropriate: allergies, current medications, past family history, past medical history, past social history, past surgical history and problem list     Review of Systems   Constitutional: Negative for chills and fever  Gastrointestinal: Negative for nausea and vomiting  Genitourinary: Positive for frequency and urgency  Negative for flank pain, hematuria and hesitancy  Objective:      /78   Temp 97 9 °F (36 6 °C)   Ht 5' 3" (1 6 m)   Wt 74 5 kg (164 lb 3 2 oz)   BMI 29 09 kg/m²          Physical Exam  Vitals and nursing note reviewed     Constitutional:       Appearance: Normal appearance  HENT:      Right Ear: External ear normal       Left Ear: External ear normal    Eyes:      Extraocular Movements: Extraocular movements intact  Conjunctiva/sclera: Conjunctivae normal       Pupils: Pupils are equal, round, and reactive to light  Cardiovascular:      Rate and Rhythm: Normal rate and regular rhythm  Pulmonary:      Effort: Pulmonary effort is normal       Breath sounds: Normal breath sounds  Abdominal:      General: Abdomen is flat  Bowel sounds are normal       Palpations: Abdomen is soft  Neurological:      General: No focal deficit present  Mental Status: She is alert and oriented to person, place, and time  Psychiatric:         Mood and Affect: Mood normal          Behavior: Behavior normal          Thought Content:  Thought content normal          Judgment: Judgment normal

## 2021-09-24 NOTE — PATIENT INSTRUCTIONS
Urinary Tract Infection in Women   AMBULATORY CARE:   A urinary tract infection (UTI)  is caused by bacteria that get inside your urinary tract  Most bacteria that enter your urinary tract come out when you urinate  If the bacteria stay in your urinary tract, you may get an infection  Your urinary tract includes your kidneys, ureters, bladder, and urethra  Urine is made in your kidneys, and it flows from the ureters to the bladder  Urine leaves the bladder through the urethra  A UTI is more common in your lower urinary tract, which includes your bladder and urethra  Common symptoms include the following:   · Urinating more often or waking from sleep to urinate    · Pain or burning when you urinate    · Pain or pressure in your lower abdomen     · Urine that smells bad    · Blood in your urine    · Leaking urine    Seek care immediately if:   · You are urinating very little or not at all  · You have a high fever with shaking chills  · You have side or back pain that gets worse  Call your doctor if:   · You have a fever  · You do not feel better after 2 days of taking antibiotics  · You are vomiting  · You have questions or concerns about your condition or care  Treatment for a UTI  may include antibiotics to treat a bacterial infection  You may also need medicines to decrease pain and burning, or decrease the urge to urinate often  If you have UTIs often (called recurrent UTIs), you may be given antibiotics to take regularly  You will be given directions for when and how to use antibiotics  The goal is to prevent UTIs but not cause antibiotic resistance by using antibiotics too often  Prevent a UTI:   · Empty your bladder often  Urinate and empty your bladder as soon as you feel the need  Do not hold your urine for long periods of time  · Wipe from front to back after you urinate or have a bowel movement    This will help prevent germs from getting into your urinary tract through your urethra  · Drink liquids as directed  Ask how much liquid to drink each day and which liquids are best for you  You may need to drink more liquids than usual to help flush out the bacteria  Do not drink alcohol, caffeine, or citrus juices  These can irritate your bladder and increase your symptoms  Your healthcare provider may recommend cranberry juice to help prevent a UTI  · Urinate after you have sex  This can help flush out bacteria passed during sex  · Do not douche or use feminine deodorants  These can change the chemical balance in your vagina  · Change sanitary pads or tampons often  This will help prevent germs from getting into your urinary tract  · Talk to your healthcare provider about your birth control method  You may need to change your method if it is increasing your risk for UTIs  · Wear cotton underwear and clothes that are loose  Tight pants and nylon underwear can trap moisture and cause bacteria to grow  · Vaginal estrogen may be recommended  This medicine helps prevent UTIs in women who have gone through menopause or are in karson-menopause  · Do pelvic muscle exercises often  Pelvic muscle exercises may help you start and stop urinating  Strong pelvic muscles may help you empty your bladder easier  Squeeze these muscles tightly for 5 seconds like you are trying to hold back urine  Then relax for 5 seconds  Gradually work up to squeezing for 10 seconds  Do 3 sets of 15 repetitions a day, or as directed  Follow up with your healthcare provider as directed:  Write down your questions so you remember to ask them during your visits  © [employer] 2021 Information is for End User's use only and may not be sold, redistributed or otherwise used for commercial purposes  All illustrations and images included in CareNotes® are the copyrighted property of [employer]  or Kathy Bauer   The above information is an  only   It is not intended as medical advice for individual conditions or treatments  Talk to your doctor, nurse or pharmacist before following any medical regimen to see if it is safe and effective for you

## 2021-10-15 ENCOUNTER — OFFICE VISIT (OUTPATIENT)
Dept: FAMILY MEDICINE CLINIC | Facility: CLINIC | Age: 41
End: 2021-10-15
Payer: COMMERCIAL

## 2021-10-15 VITALS
WEIGHT: 162.6 LBS | HEIGHT: 63 IN | DIASTOLIC BLOOD PRESSURE: 78 MMHG | SYSTOLIC BLOOD PRESSURE: 118 MMHG | BODY MASS INDEX: 28.81 KG/M2 | TEMPERATURE: 97.8 F

## 2021-10-15 DIAGNOSIS — Z13.220 SCREENING, LIPID: ICD-10-CM

## 2021-10-15 DIAGNOSIS — K59.04 CHRONIC IDIOPATHIC CONSTIPATION: ICD-10-CM

## 2021-10-15 DIAGNOSIS — E66.3 OVERWEIGHT: ICD-10-CM

## 2021-10-15 DIAGNOSIS — Z13.1 SCREENING FOR DIABETES MELLITUS: ICD-10-CM

## 2021-10-15 DIAGNOSIS — K21.9 GASTROESOPHAGEAL REFLUX DISEASE WITHOUT ESOPHAGITIS: Primary | ICD-10-CM

## 2021-10-15 PROBLEM — N30.00 ACUTE CYSTITIS WITHOUT HEMATURIA: Status: RESOLVED | Noted: 2021-09-23 | Resolved: 2021-10-15

## 2021-10-15 PROCEDURE — 99213 OFFICE O/P EST LOW 20 MIN: CPT | Performed by: FAMILY MEDICINE

## 2021-10-15 RX ORDER — FAMOTIDINE 40 MG/1
40 TABLET, FILM COATED ORAL DAILY
Qty: 30 TABLET | Refills: 1 | Status: SHIPPED | OUTPATIENT
Start: 2021-10-15 | End: 2021-11-08

## 2021-10-25 ENCOUNTER — ANNUAL EXAM (OUTPATIENT)
Dept: OBGYN CLINIC | Facility: CLINIC | Age: 41
End: 2021-10-25
Payer: COMMERCIAL

## 2021-10-25 VITALS
BODY MASS INDEX: 27.49 KG/M2 | SYSTOLIC BLOOD PRESSURE: 126 MMHG | WEIGHT: 161 LBS | HEIGHT: 64 IN | DIASTOLIC BLOOD PRESSURE: 86 MMHG

## 2021-10-25 DIAGNOSIS — N92.0 MENORRHAGIA WITH REGULAR CYCLE: ICD-10-CM

## 2021-10-25 DIAGNOSIS — Z01.419 WOMEN'S ANNUAL ROUTINE GYNECOLOGICAL EXAMINATION: Primary | ICD-10-CM

## 2021-10-25 DIAGNOSIS — Z12.4 CERVICAL CANCER SCREENING: ICD-10-CM

## 2021-10-25 DIAGNOSIS — Z12.31 ENCOUNTER FOR SCREENING MAMMOGRAM FOR BREAST CANCER: ICD-10-CM

## 2021-10-25 DIAGNOSIS — A60.04 HERPES SIMPLEX VULVOVAGINITIS: ICD-10-CM

## 2021-10-25 LAB
ALBUMIN SERPL-MCNC: 4.4 G/DL (ref 3.6–5.1)
ALBUMIN/GLOB SERPL: 1.9 (CALC) (ref 1–2.5)
ALP SERPL-CCNC: 41 U/L (ref 31–125)
ALT SERPL-CCNC: 55 U/L (ref 6–29)
AST SERPL-CCNC: 33 U/L (ref 10–30)
BASOPHILS # BLD AUTO: 62 CELLS/UL (ref 0–200)
BASOPHILS NFR BLD AUTO: 1.3 %
BILIRUB SERPL-MCNC: 1 MG/DL (ref 0.2–1.2)
BUN SERPL-MCNC: 13 MG/DL (ref 7–25)
BUN/CREAT SERPL: ABNORMAL (CALC) (ref 6–22)
CALCIUM SERPL-MCNC: 9.8 MG/DL (ref 8.6–10.2)
CHLORIDE SERPL-SCNC: 105 MMOL/L (ref 98–110)
CHOLEST SERPL-MCNC: 177 MG/DL
CHOLEST/HDLC SERPL: 4.8 (CALC)
CO2 SERPL-SCNC: 30 MMOL/L (ref 20–32)
CREAT SERPL-MCNC: 0.69 MG/DL (ref 0.5–1.1)
EOSINOPHIL # BLD AUTO: 163 CELLS/UL (ref 15–500)
EOSINOPHIL NFR BLD AUTO: 3.4 %
ERYTHROCYTE [DISTWIDTH] IN BLOOD BY AUTOMATED COUNT: 11.8 % (ref 11–15)
GLOBULIN SER CALC-MCNC: 2.3 G/DL (CALC) (ref 1.9–3.7)
GLUCOSE SERPL-MCNC: 87 MG/DL (ref 65–99)
HCT VFR BLD AUTO: 41.4 % (ref 35–45)
HDLC SERPL-MCNC: 37 MG/DL
HGB BLD-MCNC: 14.3 G/DL (ref 11.7–15.5)
LDLC SERPL CALC-MCNC: 115 MG/DL (CALC)
LYMPHOCYTES # BLD AUTO: 1531 CELLS/UL (ref 850–3900)
LYMPHOCYTES NFR BLD AUTO: 31.9 %
MCH RBC QN AUTO: 33.8 PG (ref 27–33)
MCHC RBC AUTO-ENTMCNC: 34.5 G/DL (ref 32–36)
MCV RBC AUTO: 97.9 FL (ref 80–100)
MONOCYTES # BLD AUTO: 686 CELLS/UL (ref 200–950)
MONOCYTES NFR BLD AUTO: 14.3 %
NEUTROPHILS # BLD AUTO: 2357 CELLS/UL (ref 1500–7800)
NEUTROPHILS NFR BLD AUTO: 49.1 %
NONHDLC SERPL-MCNC: 140 MG/DL (CALC)
PLATELET # BLD AUTO: 237 THOUSAND/UL (ref 140–400)
PMV BLD REES-ECKER: 10.6 FL (ref 7.5–12.5)
POTASSIUM SERPL-SCNC: 3.9 MMOL/L (ref 3.5–5.3)
PROT SERPL-MCNC: 6.7 G/DL (ref 6.1–8.1)
RBC # BLD AUTO: 4.23 MILLION/UL (ref 3.8–5.1)
SL AMB EGFR AFRICAN AMERICAN: 127 ML/MIN/1.73M2
SL AMB EGFR NON AFRICAN AMERICAN: 110 ML/MIN/1.73M2
SODIUM SERPL-SCNC: 140 MMOL/L (ref 135–146)
TRIGL SERPL-MCNC: 140 MG/DL
TSH SERPL-ACNC: 0.98 MIU/L
WBC # BLD AUTO: 4.8 THOUSAND/UL (ref 3.8–10.8)

## 2021-10-25 PROCEDURE — 1036F TOBACCO NON-USER: CPT | Performed by: OBSTETRICS & GYNECOLOGY

## 2021-10-25 PROCEDURE — 99395 PREV VISIT EST AGE 18-39: CPT | Performed by: OBSTETRICS & GYNECOLOGY

## 2021-10-25 PROCEDURE — 3008F BODY MASS INDEX DOCD: CPT | Performed by: OBSTETRICS & GYNECOLOGY

## 2021-10-25 PROCEDURE — G0145 SCR C/V CYTO,THINLAYER,RESCR: HCPCS | Performed by: OBSTETRICS & GYNECOLOGY

## 2021-10-25 PROCEDURE — G0476 HPV COMBO ASSAY CA SCREEN: HCPCS | Performed by: OBSTETRICS & GYNECOLOGY

## 2021-10-25 RX ORDER — VALACYCLOVIR HYDROCHLORIDE 1 G/1
1000 TABLET, FILM COATED ORAL DAILY
Qty: 90 TABLET | Refills: 3 | Status: SHIPPED | OUTPATIENT
Start: 2021-10-25 | End: 2022-01-23

## 2021-10-25 RX ORDER — ACETAMINOPHEN AND CODEINE PHOSPHATE 120; 12 MG/5ML; MG/5ML
1 SOLUTION ORAL DAILY
Qty: 84 TABLET | Refills: 3 | Status: SHIPPED | OUTPATIENT
Start: 2021-10-25

## 2021-10-27 LAB
HPV HR 12 DNA CVX QL NAA+PROBE: NEGATIVE
HPV16 DNA CVX QL NAA+PROBE: NEGATIVE
HPV18 DNA CVX QL NAA+PROBE: NEGATIVE

## 2021-11-01 LAB
LAB AP GYN PRIMARY INTERPRETATION: NORMAL
Lab: NORMAL

## 2021-11-08 DIAGNOSIS — K21.9 GASTROESOPHAGEAL REFLUX DISEASE WITHOUT ESOPHAGITIS: ICD-10-CM

## 2021-11-08 RX ORDER — FAMOTIDINE 40 MG/1
TABLET, FILM COATED ORAL
Qty: 30 TABLET | Refills: 1 | Status: SHIPPED | OUTPATIENT
Start: 2021-11-08 | End: 2021-12-09

## 2021-11-23 LAB
ALBUMIN SERPL-MCNC: 4.5 G/DL (ref 3.6–5.1)
ALBUMIN/GLOB SERPL: 1.9 (CALC) (ref 1–2.5)
ALP SERPL-CCNC: 40 U/L (ref 31–125)
ALT SERPL-CCNC: 23 U/L (ref 6–29)
AST SERPL-CCNC: 22 U/L (ref 10–30)
BILIRUB DIRECT SERPL-MCNC: 0.2 MG/DL
BILIRUB INDIRECT SERPL-MCNC: 0.9 MG/DL (CALC) (ref 0.2–1.2)
BILIRUB SERPL-MCNC: 1.1 MG/DL (ref 0.2–1.2)
GLOBULIN SER CALC-MCNC: 2.4 G/DL (CALC) (ref 1.9–3.7)
PROT SERPL-MCNC: 6.9 G/DL (ref 6.1–8.1)

## 2021-12-08 ENCOUNTER — OFFICE VISIT (OUTPATIENT)
Dept: FAMILY MEDICINE CLINIC | Facility: CLINIC | Age: 41
End: 2021-12-08
Payer: COMMERCIAL

## 2021-12-08 VITALS
HEIGHT: 64 IN | WEIGHT: 161 LBS | TEMPERATURE: 97.6 F | BODY MASS INDEX: 27.49 KG/M2 | DIASTOLIC BLOOD PRESSURE: 80 MMHG | SYSTOLIC BLOOD PRESSURE: 104 MMHG

## 2021-12-08 DIAGNOSIS — E78.5 DYSLIPIDEMIA: ICD-10-CM

## 2021-12-08 DIAGNOSIS — Z00.00 ANNUAL PHYSICAL EXAM: Primary | ICD-10-CM

## 2021-12-08 PROCEDURE — 99395 PREV VISIT EST AGE 18-39: CPT | Performed by: FAMILY MEDICINE

## 2021-12-08 PROCEDURE — 1036F TOBACCO NON-USER: CPT | Performed by: FAMILY MEDICINE

## 2021-12-08 PROCEDURE — 3008F BODY MASS INDEX DOCD: CPT | Performed by: FAMILY MEDICINE

## 2021-12-08 PROCEDURE — 3725F SCREEN DEPRESSION PERFORMED: CPT | Performed by: FAMILY MEDICINE

## 2021-12-08 RX ORDER — DOXYCYCLINE HYCLATE 20 MG
20 TABLET ORAL 2 TIMES DAILY
COMMUNITY
Start: 2021-11-15

## 2021-12-08 RX ORDER — TACROLIMUS 1 MG/G
OINTMENT TOPICAL
COMMUNITY
Start: 2021-11-12

## 2021-12-09 DIAGNOSIS — K21.9 GASTROESOPHAGEAL REFLUX DISEASE WITHOUT ESOPHAGITIS: ICD-10-CM

## 2021-12-09 RX ORDER — FAMOTIDINE 40 MG/1
TABLET, FILM COATED ORAL
Qty: 30 TABLET | Refills: 1 | Status: SHIPPED | OUTPATIENT
Start: 2021-12-09 | End: 2022-06-23 | Stop reason: SDUPTHER

## 2022-01-10 ENCOUNTER — TELEPHONE (OUTPATIENT)
Dept: FAMILY MEDICINE CLINIC | Facility: CLINIC | Age: 42
End: 2022-01-10

## 2022-01-10 PROCEDURE — 87636 SARSCOV2 & INF A&B AMP PRB: CPT | Performed by: FAMILY MEDICINE

## 2022-01-11 ENCOUNTER — TELEMEDICINE (OUTPATIENT)
Dept: FAMILY MEDICINE CLINIC | Facility: CLINIC | Age: 42
End: 2022-01-11
Payer: COMMERCIAL

## 2022-01-11 DIAGNOSIS — U07.1 COVID-19: Primary | ICD-10-CM

## 2022-01-11 PROCEDURE — 99442 PR PHYS/QHP TELEPHONE EVALUATION 11-20 MIN: CPT | Performed by: FAMILY MEDICINE

## 2022-06-07 LAB
ALBUMIN SERPL-MCNC: 4.6 G/DL (ref 3.6–5.1)
ALBUMIN/GLOB SERPL: 2 (CALC) (ref 1–2.5)
ALP SERPL-CCNC: 42 U/L (ref 31–125)
ALT SERPL-CCNC: 17 U/L (ref 6–29)
AST SERPL-CCNC: 21 U/L (ref 10–30)
BILIRUB SERPL-MCNC: 1.2 MG/DL (ref 0.2–1.2)
BUN SERPL-MCNC: 12 MG/DL (ref 7–25)
BUN/CREAT SERPL: NORMAL (CALC) (ref 6–22)
CALCIUM SERPL-MCNC: 9.6 MG/DL (ref 8.6–10.2)
CHLORIDE SERPL-SCNC: 106 MMOL/L (ref 98–110)
CHOLEST SERPL-MCNC: 184 MG/DL
CHOLEST/HDLC SERPL: 4.6 (CALC)
CO2 SERPL-SCNC: 26 MMOL/L (ref 20–32)
CREAT SERPL-MCNC: 0.77 MG/DL (ref 0.5–1.1)
GLOBULIN SER CALC-MCNC: 2.3 G/DL (CALC) (ref 1.9–3.7)
GLUCOSE SERPL-MCNC: 88 MG/DL (ref 65–99)
HDLC SERPL-MCNC: 40 MG/DL
LDLC SERPL CALC-MCNC: 122 MG/DL (CALC)
NONHDLC SERPL-MCNC: 144 MG/DL (CALC)
POTASSIUM SERPL-SCNC: 4 MMOL/L (ref 3.5–5.3)
PROT SERPL-MCNC: 6.9 G/DL (ref 6.1–8.1)
SL AMB EGFR AFRICAN AMERICAN: 113 ML/MIN/1.73M2
SL AMB EGFR NON AFRICAN AMERICAN: 97 ML/MIN/1.73M2
SODIUM SERPL-SCNC: 140 MMOL/L (ref 135–146)
TRIGL SERPL-MCNC: 116 MG/DL

## 2022-06-23 DIAGNOSIS — K21.9 GASTROESOPHAGEAL REFLUX DISEASE WITHOUT ESOPHAGITIS: ICD-10-CM

## 2022-06-23 RX ORDER — FAMOTIDINE 40 MG/1
40 TABLET, FILM COATED ORAL DAILY
Qty: 90 TABLET | Refills: 0 | Status: SHIPPED | OUTPATIENT
Start: 2022-06-23

## 2022-09-06 ENCOUNTER — OFFICE VISIT (OUTPATIENT)
Dept: FAMILY MEDICINE CLINIC | Facility: CLINIC | Age: 42
End: 2022-09-06
Payer: COMMERCIAL

## 2022-09-06 VITALS
SYSTOLIC BLOOD PRESSURE: 118 MMHG | WEIGHT: 161 LBS | BODY MASS INDEX: 27.49 KG/M2 | HEIGHT: 64 IN | DIASTOLIC BLOOD PRESSURE: 84 MMHG | TEMPERATURE: 97.9 F

## 2022-09-06 DIAGNOSIS — I80.02 SUPERFICIAL PHLEBITIS OF LEFT LEG: ICD-10-CM

## 2022-09-06 DIAGNOSIS — I80.9 PHLEBITIS: Primary | ICD-10-CM

## 2022-09-06 PROCEDURE — 99213 OFFICE O/P EST LOW 20 MIN: CPT | Performed by: FAMILY MEDICINE

## 2022-09-06 NOTE — PROGRESS NOTES
Assessment/Plan:    Superficial phlebitis of left leg  Discussed rest ice elevation and anti inflammatory        Diagnoses and all orders for this visit:    Phlebitis    Superficial phlebitis of left leg          Subjective:   Chief Complaint   Patient presents with    Leg Pain     2 days           Patient ID: Radha Munoz is a 38 y o  female  Patient has for last 2 days noted an area on the left lateral calf where a blood vessel seemed to hav "popped" Patient has several varicose veins in the area Patient notes the area is tender and there is some tingling Patient was concerned about possible blood clot The area is slightly tender and there is bruising There is no swelling and no warmth or redness     Leg Pain   Incident onset: 2 days  The incident occurred in the yard  There was no injury mechanism  The pain is present in the left leg  Quality: tingling and cramp  The pain is at a severity of 2/10  The pain has been fluctuating since onset  Associated symptoms include tingling  Pertinent negatives include no inability to bear weight, loss of motion, loss of sensation, muscle weakness or numbness  She reports no foreign bodies present  Nothing aggravates the symptoms  She has tried nothing for the symptoms  The following portions of the patient's history were reviewed and updated as appropriate: allergies, current medications, past family history, past medical history, past social history, past surgical history and problem list     Review of Systems   Constitutional: Negative for activity change, chills and fever  Cardiovascular: Negative for chest pain, palpitations and leg swelling  Skin: Positive for color change  Neurological: Positive for tingling  Negative for numbness  Objective:      /84   Temp 97 9 °F (36 6 °C)   Ht 5' 4" (1 626 m)   Wt 73 kg (161 lb)   BMI 27 64 kg/m²          Physical Exam  Vitals and nursing note reviewed     Constitutional:       Appearance: Normal appearance  HENT:      Head: Normocephalic  Eyes:      Conjunctiva/sclera: Conjunctivae normal       Pupils: Pupils are equal, round, and reactive to light  Cardiovascular:      Rate and Rhythm: Normal rate and regular rhythm  Pulses: Normal pulses  Comments: Left calf laterally area of ruptured capillary forming an ecchymotic area Ptient left leg pulses in tact there is no ambika's sign and no induration or edeam  Pulmonary:      Effort: Pulmonary effort is normal    Neurological:      General: No focal deficit present  Mental Status: She is alert and oriented to person, place, and time     Psychiatric:         Mood and Affect: Mood normal          Behavior: Behavior normal

## 2022-10-27 ENCOUNTER — ANNUAL EXAM (OUTPATIENT)
Dept: GYNECOLOGY | Facility: CLINIC | Age: 42
End: 2022-10-27

## 2022-10-27 VITALS
BODY MASS INDEX: 27.9 KG/M2 | HEIGHT: 64 IN | DIASTOLIC BLOOD PRESSURE: 70 MMHG | WEIGHT: 163.4 LBS | SYSTOLIC BLOOD PRESSURE: 110 MMHG

## 2022-10-27 DIAGNOSIS — N92.0 MENORRHAGIA WITH REGULAR CYCLE: ICD-10-CM

## 2022-10-27 DIAGNOSIS — Z12.31 ENCOUNTER FOR SCREENING MAMMOGRAM FOR BREAST CANCER: ICD-10-CM

## 2022-10-27 DIAGNOSIS — A60.04 HERPES SIMPLEX VULVOVAGINITIS: ICD-10-CM

## 2022-10-27 DIAGNOSIS — Z01.419 WOMEN'S ANNUAL ROUTINE GYNECOLOGICAL EXAMINATION: Primary | ICD-10-CM

## 2022-10-27 RX ORDER — VALACYCLOVIR HYDROCHLORIDE 1 G/1
1000 TABLET, FILM COATED ORAL DAILY
Qty: 90 TABLET | Refills: 3 | Status: SHIPPED | OUTPATIENT
Start: 2022-10-27 | End: 2023-01-25

## 2022-10-27 RX ORDER — ACETAMINOPHEN AND CODEINE PHOSPHATE 120; 12 MG/5ML; MG/5ML
1 SOLUTION ORAL DAILY
Qty: 84 TABLET | Refills: 3 | Status: SHIPPED | OUTPATIENT
Start: 2022-10-27

## 2022-10-27 NOTE — PROGRESS NOTES
Assessment/Plan   Diagnoses and all orders for this visit:    Women's annual routine gynecological examination    Encounter for screening mammogram for breast cancer  -     Mammo screening bilateral w 3d & cad; Future    Menorrhagia with regular cycle  -     norethindrone (MICRONOR) 0 35 MG tablet; Take 1 tablet (0 35 mg total) by mouth daily    Herpes simplex vulvovaginitis  -     valACYclovir (VALTREX) 1,000 mg tablet; Take 1 tablet (1,000 mg total) by mouth daily    1  Yearly exam-Pap smear deferred, self-breast awareness reviewed, calcium/vitamin-D recommendations discussed, mammogram request given to be done at age 38  Had colonoscopy 2020 with colon polyp, follow-up 5 years as per specialist   2  History of menorrhagia-status post endometrial ablation with Carlita in April 2019  Initially, she had heavy menses but now they are lighter  She continues on mini pill  Electronic prescription for 3 packs refill 3 sent to [facility]  She notes rare spotting at this time  3  Concerns for perimenopause-no current issues  4  HSV-continues on Valtrex suppression  Rarely has outbreaks now  Previously had 8 outbreaks per year on 500 mg dosing, less frequent on 1000 mg dosing  She continues 1000 mg daily, electronic prescription sent for 90 day supply refill 3 to [facility]  5  Status post tubal ligation  6  History of cervical dysplasia-status post LEEP 2017 for LEN 2-3  Pathology ultimately with LEN 1 with negative margins at LEEP  Pap/HPV negative 10/25/21 and also October 2018  Pap was deferred today as per current guidelines with patient agreement  7  Prior history UTI x2-no current concerns  8  Other-taking child to dentist later today  My support was given  Follow-up 1 year yearly exam or as needed  Subjective   Patient ID: Gonzalo Braun is a 38 y o  female  Vitals:    10/27/22 0802   BP: 110/70     Patient was seen today for yearly exam   Please see assessment plan for details        The following portions of the patient's history were reviewed and updated as appropriate: allergies, current medications, past family history, past medical history, past social history, past surgical history and problem list   Past Medical History:   Diagnosis Date   • Abnormal Pap smear of cervix    • Acne    • Anxiety    • ASCUS with positive high risk HPV cervical     Resolved: 10/23/2017   • BRBPR (bright red blood per rectum)    • Cancer (HCC)     pre-cancerous, cervical    • Cellulitis of right foot 2017   • Constipation     "significant"   • Diverticulosis    • Family hx of colon cancer     Paternal uncle   • GERD (gastroesophageal reflux disease)    • Herpes    • Hirsutism    • HPV (human papilloma virus) infection    • Hx of bleeding disorder     DUB   • Psychiatric disorder     anxiety   • Urinary tract infection      Past Surgical History:   Procedure Laterality Date   • COLPOSCOPY     • OVARIAN CYST REMOVAL     • KS COLONOSCOPY FLX DX W/COLLJ SPEC WHEN PFRMD N/A 1/22/2019    Procedure: COLONOSCOPY with polypectomy;  Surgeon: Kera Calderon MD;  Location: [facility]; Service: General   • KS HYSTEROSCOPY,DX,SEP [address] N/A 4/23/2019    Procedure: HYSTEROSCOPY;  Surgeon: Red Cortez MD;  Location: [facility];  Service: Gynecology   • KS HYSTEROSCOPY,W/ENDOMETRIAL ABLATION N/A 4/23/2019    Procedure: ABLATION ENDOMETRIAL;  Surgeon: Red Cortez MD;  Location: [facility];  Service: Gynecology   • TUBAL LIGATION     • WISDOM TOOTH EXTRACTION       OB History   No obstetric history on file         Current Outpatient Medications:   •  clindamycin (CLEOCIN T) 1 %, APPLY TO AREAS OF ACNE ONCE DAILY AS NEEDED, Disp: , Rfl:   •  docusate sodium (COLACE) 100 mg capsule, TAKE 1 CAPSULE BY MOUTH TWICE A DAY, Disp: 30 capsule, Rfl: 0  •  doxycycline (PERIOSTAT) 20 MG tablet, Take 20 mg by mouth 2 (two) times a day, Disp: , Rfl:   •  famotidine (PEPCID) 40 MG tablet, TAKE 1 TABLET BY MOUTH EVERY DAY, Disp: 90 tablet, Rfl: 1  •  hydrocortisone (WESTCORT) 0 2 % cream, Apply topically 2 (two) times a day, Disp: 45 g, Rfl: 0  •  Ivermectin (SOOLANTRA EX), Apply topically, Disp: , Rfl:   •  Multiple Vitamins-Minerals (MULTIVITAMIN GUMMIES ADULT PO), Take 2 tablets by mouth daily, Disp: , Rfl:   •  norethindrone (MICRONOR) 0 35 MG tablet, Take 1 tablet (0 35 mg total) by mouth daily, Disp: 84 tablet, Rfl: 3  •  polyethylene glycol (GLYCOLAX) powder, 2 tablespoons in 8 oz water daily, Disp: 500 g, Rfl: 1  •  tacrolimus (PROTOPIC) 0 1 % ointment, Apply TO affected AREAS twice daily FOR SIX WEEKS, Disp: , Rfl:   •  valACYclovir (VALTREX) 1,000 mg tablet, Take 1 tablet (1,000 mg total) by mouth daily, Disp: 90 tablet, Rfl: 3  Allergies   Allergen Reactions   • Cephalosporins Hives     Social History     Socioeconomic History   • Marital status:      Spouse name: None   • Number of children: 3   • Years of education: None   • Highest education level: None   Occupational History   • None   Tobacco Use   • Smoking status: Never Smoker   • Smokeless tobacco: Never Used   Vaping Use   • Vaping Use: Never used   Substance and Sexual Activity   • Alcohol use: Yes     Comment: once every few months   • Drug use: No   • Sexual activity: Yes     Partners: Male     Birth control/protection: OCP   Other Topics Concern   • None   Social History Narrative    Caffeine Use    Uses Safety Equipment-seatbelts     Social Determinants of Health     Financial Resource Strain: Not on file   Food Insecurity: Not on file   Transportation Needs: Not on file   Physical Activity: Not on file   Stress: Not on file   Social Connections: Not on file   Intimate Partner Violence: Not on file   Housing Stability: Not on file     Family History   Problem Relation Age of Onset   • Hyperlipidemia Mother    • Hypertension Mother    • Diabetes Mother    • Coronary artery disease Father    • Breast cancer Paternal Grandmother    • Alzheimer's disease Paternal Grandfather • Colon cancer Paternal Uncle        Review of Systems   Constitutional: Negative for chills, diaphoresis, fatigue and fever  Respiratory: Negative for apnea, cough, chest tightness, shortness of breath and wheezing  Cardiovascular: Negative for chest pain, palpitations and leg swelling  Gastrointestinal: Negative for abdominal distention, abdominal pain, anal bleeding, constipation, diarrhea, nausea, rectal pain and vomiting  Genitourinary: Negative for difficulty urinating, dyspareunia, dysuria, frequency, hematuria, menstrual problem, pelvic pain, urgency, vaginal bleeding, vaginal discharge and vaginal pain  Musculoskeletal: Negative for arthralgias, back pain and myalgias  Skin: Negative for color change and rash  Neurological: Negative for dizziness, syncope, light-headedness, numbness and headaches  Hematological: Negative for adenopathy  Does not bruise/bleed easily  Psychiatric/Behavioral: Negative for dysphoric mood and sleep disturbance  The patient is not nervous/anxious  Objective   Physical Exam  OBGyn Exam     Objective      /70 (BP Location: Left arm, Patient Position: Sitting)   Ht 5' 4" (1 626 m)   Wt 74 1 kg (163 lb 6 4 oz)   LMP 10/06/2022   BMI 28 05 kg/m²     General:   alert and oriented, in no acute distress   Neck: normal to inspection and palpation   Breast: normal appearance, no masses or tenderness   Heart:    Lungs:    Abdomen: soft, non-tender, without masses or organomegaly   Vulva: normal   Vagina: Without erythema or lesions or discharge  Normal   Cervix: Without lesions or discharge or cervicitis    No Cervical motion tenderness   Uterus: top normal size, anteverted, non-tender   Adnexa: no mass, fullness, tenderness   Rectum: negative    Psych:  Normal mood and affect   Skin:  Without obvious lesions   Eyes: symmetric, with normal movements and reactivity   Musculoskeletal:  Normal muscle tone and movements appreciated

## 2022-12-06 ENCOUNTER — TELEPHONE (OUTPATIENT)
Dept: FAMILY MEDICINE CLINIC | Facility: CLINIC | Age: 42
End: 2022-12-06

## 2022-12-06 DIAGNOSIS — Z11.52 ENCOUNTER FOR SCREENING FOR COVID-19: Primary | ICD-10-CM

## 2023-02-10 ENCOUNTER — HOSPITAL ENCOUNTER (OUTPATIENT)
Dept: MAMMOGRAPHY | Facility: MEDICAL CENTER | Age: 43
Discharge: HOME | End: 2023-02-10

## 2023-02-10 VITALS — WEIGHT: 163.36 LBS | BODY MASS INDEX: 27.89 KG/M2 | HEIGHT: 64 IN

## 2023-02-10 DIAGNOSIS — Z12.31 ENCOUNTER FOR SCREENING MAMMOGRAM FOR BREAST CANCER: ICD-10-CM

## 2023-02-21 ENCOUNTER — HOSPITAL ENCOUNTER (OUTPATIENT)
Dept: MAMMOGRAPHY | Facility: CLINIC | Age: 43
Discharge: HOME | End: 2023-02-21

## 2023-02-21 VITALS — HEIGHT: 64 IN | WEIGHT: 163 LBS | BODY MASS INDEX: 27.83 KG/M2

## 2023-02-21 DIAGNOSIS — R92.8 ABNORMAL SCREENING MAMMOGRAM: ICD-10-CM

## 2023-03-13 ENCOUNTER — TELEPHONE (OUTPATIENT)
Dept: GYNECOLOGY | Facility: CLINIC | Age: 43
End: 2023-03-13

## 2023-03-13 NOTE — TELEPHONE ENCOUNTER
----- Message from Marisela Townsend MD sent at 3/12/2023  2:27 PM EDT -----  Regarding: FW: Mammogram Test Results  Contact: [phone]  Recommend breast check with me  We can then review her findings and concerns at that time and proceed appropriately  Thanks  ----- Message -----  From: Renita Reece  Sent: 3/6/2023  12:08 PM EDT  To: Marisela Townsend MD  Subject: FW: Mammogram Test Results                         ----- Message -----  From: Andra Corona  Sent: 3/5/2023  10:08 PM EST  To: [facility]  Subject: Mammogram Test Results                           Hi Dr Sergio Canela,    I had my first mammogram and the follow up mammogram  I’m not feeling very comfortable with the results  I knew something wasn’t right during the first one  The left side felt different like something was there and I had some pain for a few days afterwards only on the left side  I think there could be a lump as well   Is there additional testing we can have done instead of waiting six months for another mammogram?    Thank you,    [address] [place] [address]

## 2023-03-16 ENCOUNTER — OFFICE VISIT (OUTPATIENT)
Dept: GYNECOLOGY | Facility: CLINIC | Age: 43
End: 2023-03-16

## 2023-03-16 VITALS
DIASTOLIC BLOOD PRESSURE: 72 MMHG | HEIGHT: 64 IN | BODY MASS INDEX: 28.03 KG/M2 | WEIGHT: 164.2 LBS | SYSTOLIC BLOOD PRESSURE: 112 MMHG

## 2023-03-16 DIAGNOSIS — N64.4 BREAST PAIN, LEFT: Primary | ICD-10-CM

## 2023-03-16 DIAGNOSIS — R92.2 DENSE BREAST TISSUE ON MAMMOGRAM: ICD-10-CM

## 2023-03-16 DIAGNOSIS — N60.19 FIBROCYSTIC BREAST CHANGES, UNSPECIFIED LATERALITY: ICD-10-CM

## 2023-03-16 PROBLEM — R92.30 DENSE BREAST TISSUE ON MAMMOGRAM: Status: ACTIVE | Noted: 2023-03-16

## 2023-03-16 NOTE — PROGRESS NOTES
Assessment/Plan   Diagnoses and all orders for this visit:    Breast pain, left  -     US breast left limited (diagnostic); Future    Fibrocystic breast changes, unspecified laterality    Dense breast tissue on mammogram    1  left breast discomfort- patient had mammogram on 2/10/2023  Around this time or after the mammogram, she started noting left breast discomfort  She denies any warmth or erythema or discharge, but notes possible fullness at 12:00 in the left breast   Exam is notable for dense fibrocystic changes in the upper midline extending to the upper outer quadrants bilaterally, more prominent at the left versus the right breast   In her area of tenderness, exam is consistent with probable dense fibrocystic changes  She did have left diagnostic mammogram 2/21/2023 without any findings in this area with stable breast calcifications  She was counseled about options including left breast ultrasound versus ABUS versus referral to breast specialist   She is interested in proceeding with left breast ultrasound given her symptoms  She will get this done  She will follow-up in about 4 weeks for breast check and proceed accordingly  2  left breast calcifications- noted at 4:00 on screening mammogram and on left diagnostic mammogram   Radiology recommended 6-month left diagnostic mammogram which has been ordered and is scheduled for August 2023  3  dense breast changes-noted on mammogram   Patient was counseled about this in [facility]'s breast screening algorithm was reviewed  Counseled about limited visualization and possible increased risk related to dense breast changes  She may consider ABUS going forward, could consider this in 6 months time or so  We will discuss more fully at the follow-up visit  4   History of menorrhagia- status post Carlita endometrial ablation April 2019, now with light rare menses on minipill  5   HSV-continues Valtrex 1000 mg dosing suppression    6   Status post tubal ligation  7  History of cervical dysplasia-status post LEEP 2017 for LEN-2 to 3 pathology with LEN-1 at the time of LEEP negative margins  Pap with HPV negative from October 2018 and 10/25/2021   8   History of UTI  9  family history-paternal grandmother with breast cancer  Follow-up 4 to 6 weeks time for breast check or as needed  Subjective   Patient ID: Maria Eugenia Haney is a 38 y o  female  Vitals:    03/16/23 0920   BP: 112/72     Patient was seen today for follow-up visit  Please see assessment plan for details  The following portions of the patient's history were reviewed and updated as appropriate: allergies, current medications, past family history, past medical history, past social history, past surgical history and problem list   Past Medical History:   Diagnosis Date   • Abnormal Pap smear of cervix    • Acne    • Anxiety    • ASCUS with positive high risk HPV cervical     Resolved: 10/23/2017   • BRBPR (bright red blood per rectum)    • Cancer (HCC)     pre-cancerous, cervical    • Cellulitis of right foot 2017   • Constipation     "significant"   • Diverticulosis    • Family hx of colon cancer     Paternal uncle   • GERD (gastroesophageal reflux disease)    • Herpes    • Hirsutism    • HPV (human papilloma virus) infection    • Hx of bleeding disorder     DUB   • Psychiatric disorder     anxiety   • Urinary tract infection      Past Surgical History:   Procedure Laterality Date   • COLPOSCOPY     • OVARIAN CYST REMOVAL     • GA COLONOSCOPY FLX DX W/COLLJ SPEC WHEN PFRMD N/A 1/22/2019    Procedure: COLONOSCOPY with polypectomy;  Surgeon: James Robles MD;  Location: [facility];   Service: General   • GA HYSTEROSCOPY DIAGNOSTIC SEPARATE PROCEDURE N/A 4/23/2019    Procedure: HYSTEROSCOPY;  Surgeon: Miranda Sigala MD;  Location: [facility];  Service: Gynecology   • GA HYSTEROSCOPY ENDOMETRIAL ABLATION N/A 4/23/2019    Procedure: ABLATION ENDOMETRIAL;  Surgeon: Miranda Sigala MD;  Location: [facility];  Service: Gynecology   • TUBAL LIGATION     • WISDOM TOOTH EXTRACTION       OB History    Para Term  AB Living   3 3 3         SAB IAB Ectopic Multiple Live Births                  # Outcome Date GA Lbr Isai/2nd Weight Sex Delivery Anes PTL Lv   3 Term            2 Term            1 Term                Current Outpatient Medications:   •  docusate sodium (COLACE) 100 mg capsule, TAKE 1 CAPSULE BY MOUTH TWICE A DAY, Disp: 30 capsule, Rfl: 0  •  famotidine (PEPCID) 40 MG tablet, TAKE 1 TABLET BY MOUTH EVERY DAY, Disp: 90 tablet, Rfl: 1  •  hydrocortisone (WESTCORT) 0 2 % cream, Apply topically 2 (two) times a day, Disp: 45 g, Rfl: 0  •  Multiple Vitamins-Minerals (MULTIVITAMIN GUMMIES ADULT PO), Take 2 tablets by mouth daily, Disp: , Rfl:   •  norethindrone (MICRONOR) 0 35 MG tablet, Take 1 tablet (0 35 mg total) by mouth daily, Disp: 84 tablet, Rfl: 3  •  clindamycin (CLEOCIN T) 1 %, APPLY TO AREAS OF ACNE ONCE DAILY AS NEEDED, Disp: , Rfl:   •  doxycycline (PERIOSTAT) 20 MG tablet, Take 20 mg by mouth 2 (two) times a day, Disp: , Rfl:   •  Ivermectin (SOOLANTRA EX), Apply topically, Disp: , Rfl:   •  polyethylene glycol (GLYCOLAX) powder, 2 tablespoons in 8 oz water daily, Disp: 500 g, Rfl: 1  •  tacrolimus (PROTOPIC) 0 1 % ointment, Apply TO affected AREAS twice daily FOR SIX WEEKS, Disp: , Rfl:   •  valACYclovir (VALTREX) 1,000 mg tablet, Take 1 tablet (1,000 mg total) by mouth daily, Disp: 90 tablet, Rfl: 3  Allergies   Allergen Reactions   • Cephalosporins Hives     Social History     Socioeconomic History   • Marital status:      Spouse name: None   • Number of children: 3   • Years of education: None   • Highest education level: None   Occupational History   • None   Tobacco Use   • Smoking status: Never   • Smokeless tobacco: Never   Vaping Use   • Vaping Use: Never used   Substance and Sexual Activity   • Alcohol use: Yes     Comment: once every few months   • Drug use: No   • Sexual activity: Yes     Partners: Male     Birth control/protection: OCP   Other Topics Concern   • None   Social History Narrative    Caffeine Use    Uses Safety Equipment-seatbelts     Social Determinants of Health     Financial Resource Strain: Not on file   Food Insecurity: Not on file   Transportation Needs: Not on file   Physical Activity: Not on file   Stress: Not on file   Social Connections: Not on file   Intimate Partner Violence: Not on file   Housing Stability: Not on file     Family History   Problem Relation Age of Onset   • Hyperlipidemia Mother    • Hypertension Mother    • Diabetes Mother    • Coronary artery disease Father    • No Known Problems Sister    • No Known Problems Sister    • No Known Problems Sister    • No Known Problems Sister    • No Known Problems Daughter    • No Known Problems Daughter    • No Known Problems Daughter    • Breast cancer Paternal Grandmother [de-identified]   • Alzheimer's disease Paternal Grandfather    • No Known Problems Paternal Aunt    • No Known Problems Paternal Aunt    • Colon cancer Paternal Uncle    • Brain cancer Paternal Uncle        Review of Systems   Constitutional: Negative for chills, diaphoresis, fatigue and fever  Respiratory: Negative for apnea, cough, chest tightness, shortness of breath and wheezing  Cardiovascular: Negative for chest pain, palpitations and leg swelling  Gastrointestinal: Negative for abdominal distention, abdominal pain, anal bleeding, constipation, diarrhea, nausea, rectal pain and vomiting  Genitourinary: Negative for difficulty urinating, dyspareunia, dysuria, frequency, hematuria, menstrual problem, pelvic pain, urgency, vaginal bleeding, vaginal discharge and vaginal pain  Musculoskeletal: Negative for arthralgias, back pain and myalgias  Skin: Negative for color change and rash  Neurological: Negative for dizziness, syncope, light-headedness, numbness and headaches  Hematological: Negative for adenopathy   Does not bruise/bleed easily  Psychiatric/Behavioral: Negative for dysphoric mood and sleep disturbance  The patient is not nervous/anxious  Objective   Physical Exam  OBGyn Exam     Objective      /72 (BP Location: Right arm, Patient Position: Sitting)   Ht 5' 4" (1 626 m)   Wt 74 5 kg (164 lb 3 2 oz)   LMP 01/23/2023   BMI 28 18 kg/m²     General:   alert and oriented, in no acute distress   Neck: normal to inspection and palpation   Breast:  Fibrocystic changes noted bilaterally in the upper midline extending to the upper outer quadrants of the breast bilaterally  Slightly more prominent left greater than right  At 12:00 left breast, approximately 4 cm above the nipple, area of tenderness appreciated by the patient  No dominant mass or warmth or erythema or discharge noted, but noted to be in the fibrocystic change which is slightly asymmetric compared to the right     Heart:    Lungs:    Abdomen: soft, non-tender, without masses or organomegaly   Vulva:    Vagina:    Cervix:    Uterus:    Adnexa:    Rectum:     Psych:  Normal mood and affect   Skin:  Without obvious lesions   Eyes: symmetric, with normal movements and reactivity   Musculoskeletal:  Normal muscle tone and movements appreciated

## 2023-03-24 DIAGNOSIS — K21.9 GASTROESOPHAGEAL REFLUX DISEASE WITHOUT ESOPHAGITIS: ICD-10-CM

## 2023-03-24 RX ORDER — FAMOTIDINE 40 MG/1
TABLET, FILM COATED ORAL
Qty: 90 TABLET | Refills: 1 | Status: SHIPPED | OUTPATIENT
Start: 2023-03-24

## 2023-04-05 ENCOUNTER — HOSPITAL ENCOUNTER (OUTPATIENT)
Dept: ULTRASOUND IMAGING | Facility: CLINIC | Age: 43
Discharge: HOME | End: 2023-04-05

## 2023-04-05 DIAGNOSIS — N64.4 BREAST PAIN, LEFT: ICD-10-CM

## 2023-04-18 PROBLEM — N60.02 BREAST CYST, LEFT: Status: ACTIVE | Noted: 2023-04-18

## 2023-08-22 ENCOUNTER — HOSPITAL ENCOUNTER (OUTPATIENT)
Dept: MAMMOGRAPHY | Facility: CLINIC | Age: 43
Discharge: HOME | End: 2023-08-22
Payer: COMMERCIAL

## 2023-08-22 VITALS — HEIGHT: 64 IN | BODY MASS INDEX: 28.68 KG/M2 | WEIGHT: 168 LBS

## 2023-08-22 DIAGNOSIS — Z09 FOLLOW-UP EXAM, 3-6 MONTHS SINCE PREVIOUS EXAM: ICD-10-CM

## 2023-08-22 PROCEDURE — 77065 DX MAMMO INCL CAD UNI: CPT

## 2023-09-19 DIAGNOSIS — K21.9 GASTROESOPHAGEAL REFLUX DISEASE WITHOUT ESOPHAGITIS: ICD-10-CM

## 2023-09-19 RX ORDER — FAMOTIDINE 40 MG/1
TABLET, FILM COATED ORAL
Qty: 90 TABLET | Refills: 1 | Status: SHIPPED | OUTPATIENT
Start: 2023-09-19

## 2023-10-11 DIAGNOSIS — N92.0 MENORRHAGIA WITH REGULAR CYCLE: ICD-10-CM

## 2023-10-18 RX ORDER — ACETAMINOPHEN AND CODEINE PHOSPHATE 120; 12 MG/5ML; MG/5ML
1 SOLUTION ORAL DAILY
Qty: 84 TABLET | Refills: 0 | Status: SHIPPED | OUTPATIENT
Start: 2023-10-18

## 2023-11-02 ENCOUNTER — ANNUAL EXAM (OUTPATIENT)
Dept: GYNECOLOGY | Facility: CLINIC | Age: 43
End: 2023-11-02
Payer: COMMERCIAL

## 2023-11-02 VITALS
DIASTOLIC BLOOD PRESSURE: 72 MMHG | HEIGHT: 64 IN | BODY MASS INDEX: 29.37 KG/M2 | SYSTOLIC BLOOD PRESSURE: 116 MMHG | WEIGHT: 172 LBS

## 2023-11-02 DIAGNOSIS — R92.8 MAMMOGRAM ABNORMAL: ICD-10-CM

## 2023-11-02 DIAGNOSIS — R92.333 HETEROGENEOUSLY DENSE TISSUE OF BOTH BREASTS ON MAMMOGRAPHY: ICD-10-CM

## 2023-11-02 DIAGNOSIS — A60.04 HERPES SIMPLEX VULVOVAGINITIS: ICD-10-CM

## 2023-11-02 DIAGNOSIS — N92.0 MENORRHAGIA WITH REGULAR CYCLE: ICD-10-CM

## 2023-11-02 DIAGNOSIS — Z01.419 ENCOUNTER FOR GYNECOLOGICAL EXAMINATION WITHOUT ABNORMAL FINDING: Primary | ICD-10-CM

## 2023-11-02 DIAGNOSIS — Z12.31 VISIT FOR SCREENING MAMMOGRAM: ICD-10-CM

## 2023-11-02 PROCEDURE — G0145 SCR C/V CYTO,THINLAYER,RESCR: HCPCS | Performed by: OBSTETRICS & GYNECOLOGY

## 2023-11-02 PROCEDURE — G0476 HPV COMBO ASSAY CA SCREEN: HCPCS | Performed by: OBSTETRICS & GYNECOLOGY

## 2023-11-02 PROCEDURE — S0612 ANNUAL GYNECOLOGICAL EXAMINA: HCPCS | Performed by: OBSTETRICS & GYNECOLOGY

## 2023-11-02 RX ORDER — ACETAMINOPHEN AND CODEINE PHOSPHATE 120; 12 MG/5ML; MG/5ML
1 SOLUTION ORAL DAILY
Qty: 84 TABLET | Refills: 3 | Status: SHIPPED | OUTPATIENT
Start: 2023-11-02

## 2023-11-02 RX ORDER — VALACYCLOVIR HYDROCHLORIDE 1 G/1
1000 TABLET, FILM COATED ORAL DAILY
Qty: 90 TABLET | Refills: 3 | Status: SHIPPED | OUTPATIENT
Start: 2023-11-02 | End: 2024-10-27

## 2023-11-02 NOTE — PROGRESS NOTES
Assessment/Plan   Diagnoses and all orders for this visit:    Encounter for gynecological examination without abnormal finding    Mammogram abnormal  -     Mammo diagnostic bilateral w 3d & cad; Future    Visit for screening mammogram  -     Mammo diagnostic bilateral w 3d & cad; Future    Heterogeneously dense tissue of both breasts on mammography  -     US breast screening bilateral complete (ABUS); Future    Menorrhagia with regular cycle  -     norethindrone (MICRONOR) 0.35 MG tablet; Take 1 tablet (0.35 mg total) by mouth daily    Herpes simplex vulvovaginitis  -     valACYclovir (VALTREX) 1,000 mg tablet; Take 1 tablet (1,000 mg total) by mouth daily    1. yearly exam-Pap smear done with HPV testing, self breast awareness reviewed, calcium/vitamin D recommendations discussed, mammogram request given  2. left breast calcifications-did have screening mammogram 2/10/2023 with diagnostic left mammogram for calcifications done 2/21/2023 and 8/22/2023. 6-month follow-up was recommended, bilateral diagnostic mammogram was ordered to be done February 2024. 3. dense breast changes-was recommended to get ABUS previously. She was given ABUS request along with ABUS sheet. Highly encouraged to check with insurance company regarding coverage for this procedure. Suggested she consider getting this done either with the February 2024 with bilateral diagnostic mammogram or possibly August 2024 as an interval screening. She will consider this and proceed accordingly. 4.  Previous left breast discomfort-denies concerns. 5.  Menorrhagia-status post endometrial ablation with Carlita April 2019. Does note rare light bleeding every few months or so. She will call with any recurrence of menometrorrhagia. 6. HSV-continues on Valtrex 1000 mg dosing for suppression. Electronic prescription for 90-day supply refill 3 was sent to local pharmacy. Notes rare outbreak about 2-3 times per year with this regimen.   7. contraception-status post tubal ligation. She continues on minipill for menometrorrhagia. Electronic prescription for 3 packs refill 3 was sent to local pharmacy. 8.  History of cervical dysplasia-status post LEEP 2017 for LEN-2 to 3 on colposcopy, LEN-1 noted at the time of LEEP with negative margins. Has had negative testing since then. Pap with HPV was negative from October 2018 and 10/25/2021. Pap with HPV done today, disposition as per findings. 9. previous history UTI-no current concerns  8. family history of breast cancer-paternal grandmother  Follow-up 1 year for yearly exam or as needed. Subjective   Patient ID: Ananda Jones is a 41 y.o. female. Vitals:    11/02/23 0828   BP: 116/72     Patient was seen today for yearly exam.  Please see assessment plan for details. The following portions of the patient's history were reviewed and updated as appropriate: allergies, current medications, past family history, past medical history, past social history, past surgical history, and problem list.  Past Medical History:   Diagnosis Date    Abnormal Pap smear of cervix     Acne     Anxiety     ASCUS with positive high risk HPV cervical     Resolved: 10/23/2017    BRBPR (bright red blood per rectum)     Cancer (HCC)     pre-cancerous, cervical     Cellulitis of right foot 2017    Constipation     "significant"    Diverticulosis     Family hx of colon cancer     Paternal uncle    GERD (gastroesophageal reflux disease)     Herpes     Hirsutism     HPV (human papilloma virus) infection     Hx of bleeding disorder     DUB    Psychiatric disorder     anxiety    Urinary tract infection      Past Surgical History:   Procedure Laterality Date    COLPOSCOPY      OVARIAN CYST REMOVAL      ND COLONOSCOPY FLX DX W/COLLJ SPEC WHEN PFRMD N/A 1/22/2019    Procedure: COLONOSCOPY with polypectomy;  Surgeon: Spencer Uriarte MD;  Location: [facility];   Service: General    ND HYSTEROSCOPY DIAGNOSTIC SEPARATE PROCEDURE N/A 2019    Procedure: HYSTEROSCOPY;  Surgeon: Juliet Oconnor MD;  [facility];  Service: Gynecology    UT HYSTEROSCOPY ENDOMETRIAL ABLATION N/A 2019    Procedure: ABLATION ENDOMETRIAL;  Surgeon: Juliet Oconnor MD;  [facility];  Service: Gynecology    TUBAL LIGATION      WISDOM TOOTH EXTRACTION       OB History    Para Term  AB Living   3 3 3         SAB IAB Ectopic Multiple Live Births           3      # Outcome Date GA Lbr Isai/2nd Weight Sex Delivery Anes PTL Lv   3 Term            2 Term            1 Term                Current Outpatient Medications:     norethindrone (MICRONOR) 0.35 MG tablet, Take 1 tablet (0.35 mg total) by mouth daily, Disp: 84 tablet, Rfl: 3    valACYclovir (VALTREX) 1,000 mg tablet, Take 1 tablet (1,000 mg total) by mouth daily, Disp: 90 tablet, Rfl: 3    clindamycin (CLEOCIN T) 1 %, APPLY TO AREAS OF ACNE ONCE DAILY AS NEEDED, Disp: , Rfl:     docusate sodium (COLACE) 100 mg capsule, TAKE 1 CAPSULE BY MOUTH TWICE A DAY, Disp: 30 capsule, Rfl: 0    doxycycline (PERIOSTAT) 20 MG tablet, Take 20 mg by mouth 2 (two) times a day, Disp: , Rfl:     famotidine (PEPCID) 40 MG tablet, TAKE 1 TABLET BY MOUTH EVERY DAY, Disp: 90 tablet, Rfl: 1    hydrocortisone (WESTCORT) 0.2 % cream, Apply topically 2 (two) times a day, Disp: 45 g, Rfl: 0    Ivermectin (SOOLANTRA EX), Apply topically, Disp: , Rfl:     Multiple Vitamins-Minerals (MULTIVITAMIN GUMMIES ADULT PO), Take 2 tablets by mouth daily, Disp: , Rfl:     polyethylene glycol (GLYCOLAX) powder, 2 tablespoons in 8 oz water daily, Disp: 500 g, Rfl: 1    tacrolimus (PROTOPIC) 0.1 % ointment, Apply TO affected AREAS twice daily FOR SIX WEEKS, Disp: , Rfl:   Allergies   Allergen Reactions    Cephalosporins Hives     Social History     Socioeconomic History    Marital status:      Spouse name: None    Number of children: 3    Years of education: None    Highest education level: None Occupational History    None   Tobacco Use    Smoking status: Never    Smokeless tobacco: Never   Vaping Use    Vaping Use: Never used   Substance and Sexual Activity    Alcohol use: Yes     Comment: once every few months    Drug use: No    Sexual activity: Yes     Partners: Male     Birth control/protection: OCP   Other Topics Concern    None   Social History Narrative    Caffeine Use    Uses Safety Equipment-seatbelts     Social Determinants of Health     Financial Resource Strain: Not on file   Food Insecurity: Not on file   Transportation Needs: Not on file   Physical Activity: Not on file   Stress: Not on file   Social Connections: Not on file   Intimate Partner Violence: Not on file   Housing Stability: Not on file     Family History   Problem Relation Age of Onset    Hyperlipidemia Mother     Hypertension Mother     Diabetes Mother     Coronary artery disease Father     No Known Problems Sister     No Known Problems Sister     No Known Problems Sister     No Known Problems Sister     No Known Problems Daughter     No Known Problems Daughter     No Known Problems Daughter     Breast cancer Paternal Grandmother 80    Alzheimer's disease Paternal Grandfather     No Known Problems Paternal Aunt     No Known Problems Paternal Aunt     Colon cancer Paternal Uncle     Brain cancer Paternal Uncle        Review of Systems   Constitutional:  Negative for chills, diaphoresis, fatigue and fever. Respiratory:  Negative for apnea, cough, chest tightness, shortness of breath and wheezing. Cardiovascular:  Negative for chest pain, palpitations and leg swelling. Gastrointestinal:  Negative for abdominal distention, abdominal pain, anal bleeding, constipation, diarrhea, nausea, rectal pain and vomiting. Genitourinary:  Negative for difficulty urinating, dyspareunia, dysuria, frequency, hematuria, menstrual problem, pelvic pain, urgency, vaginal bleeding, vaginal discharge and vaginal pain.    Musculoskeletal: Negative for arthralgias, back pain and myalgias. Skin:  Negative for color change and rash. Neurological:  Negative for dizziness, syncope, light-headedness, numbness and headaches. Hematological:  Negative for adenopathy. Does not bruise/bleed easily. Psychiatric/Behavioral:  Negative for dysphoric mood and sleep disturbance. The patient is not nervous/anxious. Objective   Physical Exam  OBGyn Exam     Objective      /72   Ht 5' 4" (1.626 m)   Wt 78 kg (172 lb)   BMI 29.52 kg/m²     General:   alert and oriented, in no acute distress   Neck: normal to inspection and palpation   Breast: normal appearance, no masses or tenderness   Heart:    Lungs:    Abdomen: soft, non-tender, without masses or organomegaly   Vulva: normal   Vagina: Without erythema or lesions or discharge. Normal   Cervix: Without lesions or discharge or cervicitis.   No Cervical motion tenderness   Uterus: top normal size, anteverted, non-tender   Adnexa: no mass, fullness, tenderness   Rectum: negative    Psych:  Normal mood and affect   Skin:  Without obvious lesions   Eyes: symmetric, with normal movements and reactivity   Musculoskeletal:  Normal muscle tone and movements appreciated

## 2023-11-08 ENCOUNTER — OFFICE VISIT (OUTPATIENT)
Dept: FAMILY MEDICINE CLINIC | Facility: CLINIC | Age: 43
End: 2023-11-08
Payer: COMMERCIAL

## 2023-11-08 VITALS
HEIGHT: 64 IN | BODY MASS INDEX: 29.26 KG/M2 | WEIGHT: 171.4 LBS | SYSTOLIC BLOOD PRESSURE: 124 MMHG | DIASTOLIC BLOOD PRESSURE: 78 MMHG

## 2023-11-08 DIAGNOSIS — Z13.1 SCREENING FOR DIABETES MELLITUS: ICD-10-CM

## 2023-11-08 DIAGNOSIS — E78.5 DYSLIPIDEMIA: ICD-10-CM

## 2023-11-08 DIAGNOSIS — Z23 ENCOUNTER FOR IMMUNIZATION: ICD-10-CM

## 2023-11-08 DIAGNOSIS — Z13.6 SCREENING FOR CARDIOVASCULAR CONDITION: ICD-10-CM

## 2023-11-08 DIAGNOSIS — Z00.00 ANNUAL PHYSICAL EXAM: Primary | ICD-10-CM

## 2023-11-08 DIAGNOSIS — E55.9 VITAMIN D DEFICIENCY: ICD-10-CM

## 2023-11-08 PROBLEM — D06.9 CIN III (CERVICAL INTRAEPITHELIAL NEOPLASIA GRADE III) WITH SEVERE DYSPLASIA: Status: RESOLVED | Noted: 2017-09-06 | Resolved: 2023-11-08

## 2023-11-08 PROBLEM — Z98.890 S/P ENDOMETRIAL ABLATION: Status: RESOLVED | Noted: 2019-04-23 | Resolved: 2023-11-08

## 2023-11-08 PROBLEM — K59.04 CHRONIC IDIOPATHIC CONSTIPATION: Status: RESOLVED | Noted: 2018-11-15 | Resolved: 2023-11-08

## 2023-11-08 PROBLEM — K64.4 EXTERNAL HEMORRHOIDS: Status: RESOLVED | Noted: 2018-11-15 | Resolved: 2023-11-08

## 2023-11-08 PROBLEM — N92.0 MENORRHAGIA WITH REGULAR CYCLE: Status: RESOLVED | Noted: 2019-03-07 | Resolved: 2023-11-08

## 2023-11-08 PROCEDURE — 99396 PREV VISIT EST AGE 40-64: CPT | Performed by: FAMILY MEDICINE

## 2023-11-08 NOTE — PATIENT INSTRUCTIONS
Wellness Visit for Adults   AMBULATORY CARE:   A wellness visit  is when you see your healthcare provider to get screened for health problems. Your healthcare provider will also give you advice on how to stay healthy. Write down your questions so you remember to ask them. Ask your healthcare provider how often you should have a wellness visit. What happens at a wellness visit:  Your healthcare provider will ask about your health, and your family history of health problems. This includes high blood pressure, heart disease, and cancer. He or she will ask if you have symptoms that concern you, if you smoke, and about your mood. You may also be asked about your intake of medicines, supplements, food, and alcohol. Any of the following may be done: Your weight  will be checked. Your height may also be checked so your body mass index (BMI) can be calculated. Your BMI shows if you are at a healthy weight. Your blood pressure  and heart rate will be checked. Your temperature may also be checked. Blood and urine tests  may be done. Blood tests may be done to check your cholesterol levels. Abnormal cholesterol levels increase your risk for heart disease and stroke. You may also need a blood or urine test to check for diabetes if you are at increased risk. Urine tests may be done to look for signs of an infection or kidney disease. A physical exam  includes checking your heartbeat and lungs with a stethoscope. Your healthcare provider may also check your skin to look for sun damage. Screening tests  may be recommended. A screening test is done to check for diseases that may not cause symptoms. The screening tests you may need depend on your age, gender, family history, and lifestyle habits. For example, colorectal screening may be recommended if you are 48years old or older. Screening tests you need if you are a woman:   A Pap smear  is used to screen for cervical cancer.  Pap smears are usually done every 3 to 5 years depending on your age. You may need them more often if you have had abnormal Pap smear test results in the past. Ask your healthcare provider how often you should have a Pap smear. A mammogram  is an x-ray of your breasts to screen for breast cancer. Experts recommend mammograms every 2 years starting at age 48 years. You may need a mammogram at age 52 years or younger if you have an increased risk for breast cancer. Talk to your healthcare provider about when you should start having mammograms and how often you need them. Vaccines you may need:   Get an influenza vaccine  every year. The influenza vaccine protects you from the flu. Several types of viruses cause the flu. The viruses change over time, so new vaccines are made each year. Get a tetanus-diphtheria (Td) booster vaccine  every 10 years. This vaccine protects you against tetanus and diphtheria. Tetanus is a severe infection that may cause painful muscle spasms and lockjaw. Diphtheria is a severe bacterial infection that causes a thick covering in the back of your mouth and throat. Get a human papillomavirus (HPV) vaccine  if you are female and aged 23 to 32 or male 23 to 24 and never received it. This vaccine protects you from HPV infection. HPV is the most common infection spread by sexual contact. HPV may also cause vaginal, penile, and anal cancers. Get a pneumococcal vaccine  if you are aged 72 years or older. The pneumococcal vaccine is an injection given to protect you from pneumococcal disease. Pneumococcal disease is an infection caused by pneumococcal bacteria. The infection may cause pneumonia, meningitis, or an ear infection. Get a shingles vaccine  if you are 60 or older, even if you have had shingles before. The shingles vaccine is an injection to protect you from the varicella-zoster virus. This is the same virus that causes chickenpox.  Shingles is a painful rash that develops in people who had chickenpox or have been exposed to the virus. How to eat healthy:  My Plate is a model for planning healthy meals. It shows the types and amounts of foods that should go on your plate. Fruits and vegetables make up about half of your plate, and grains and protein make up the other half. A serving of dairy is included on the side of your plate. The amount of calories and serving sizes you need depends on your age, gender, weight, and height. Examples of healthy foods are listed below:  Eat a variety of vegetables  such as dark green, red, and orange vegetables. You can also include canned vegetables low in sodium (salt) and frozen vegetables without added butter or sauces. Eat a variety of fresh fruits , canned fruit in 100% juice, frozen fruit, and dried fruit. Include whole grains. At least half of the grains you eat should be whole grains. Examples include whole-wheat bread, wheat pasta, brown rice, and whole-grain cereals such as oatmeal.    Eat a variety of protein foods such as seafood (fish and shellfish), lean meat, and poultry without skin (turkey and chicken). Examples of lean meats include pork leg, shoulder, or tenderloin, and beef round, sirloin, tenderloin, and extra lean ground beef. Other protein foods include eggs and egg substitutes, beans, peas, soy products, nuts, and seeds. Choose low-fat dairy products such as skim or 1% milk or low-fat yogurt, cheese, and cottage cheese. Limit unhealthy fats  such as butter, hard margarine, and shortening. Exercise:  Exercise at least 30 minutes per day on most days of the week. Some examples of exercise include walking, biking, dancing, and swimming. You can also fit in more physical activity by taking the stairs instead of the elevator or parking farther away from stores. Include muscle strengthening activities 2 days each week. Regular exercise provides many health benefits.  It helps you manage your weight, and decreases your risk for type 2 diabetes, heart disease, stroke, and high blood pressure. Exercise can also help improve your mood. Ask your healthcare provider about the best exercise plan for you. General health and safety guidelines:   Do not smoke. Nicotine and other chemicals in cigarettes and cigars can cause lung damage. Ask your healthcare provider for information if you currently smoke and need help to quit. E-cigarettes or smokeless tobacco still contain nicotine. Talk to your healthcare provider before you use these products. Limit alcohol. A drink of alcohol is 12 ounces of beer, 5 ounces of wine, or 1½ ounces of liquor. Lose weight, if needed. Being overweight increases your risk of certain health conditions. These include heart disease, high blood pressure, type 2 diabetes, and certain types of cancer. Protect your skin. Do not sunbathe or use tanning beds. Use sunscreen with a SPF 15 or higher. Apply sunscreen at least 15 minutes before you go outside. Reapply sunscreen every 2 hours. Wear protective clothing, hats, and sunglasses when you are outside. Drive safely. Always wear your seatbelt. Make sure everyone in your car wears a seatbelt. A seatbelt can save your life if you are in an accident. Do not use your cell phone when you are driving. This could distract you and cause an accident. Pull over if you need to make a call or send a text message. Practice safe sex. Use latex condoms if are sexually active and have more than one partner. Your healthcare provider may recommend screening tests for sexually transmitted infections (STIs). Wear helmets, lifejackets, and protective gear. Always wear a helmet when you ride a bike or motorcycle, go skiing, or play sports that could cause a head injury. Wear protective equipment when you play sports. Wear a lifejacket when you are on a boat or doing water sports.     © Copyright [facility] 2023 Information is for End User's use only and may not be sold, redistributed or otherwise used for commercial purposes. The above information is an  only. It is not intended as medical advice for individual conditions or treatments. Talk to your doctor, nurse or pharmacist before following any medical regimen to see if it is safe and effective for you.

## 2023-11-08 NOTE — PROGRESS NOTES
[id number] PRIMARY CARE    NAME: Priya Murguia  AGE: 41 y.o. SEX: female  : 1980     DATE: 2023     Assessment and Plan:     Problem List Items Addressed This Visit          Other    Annual physical exam - Primary     Discussed screening tests with patient She has had colonoscopy and has a polyp patient is due for repeat Patient mammogram is up to date Patient PAP was done and is pending Patient to have fasting labs Discussed healthy diet and also immunizations with patient Patient declines covid and flu shot         Dyslipidemia     Slightly low HDL          Screening for diabetes mellitus    Relevant Orders    Comprehensive metabolic panel    Vitamin D deficiency     Check labs          Relevant Orders    Vitamin D 25 hydroxy    RESOLVED: Encounter for immunization     Other Visit Diagnoses       Screening for cardiovascular condition        Relevant Orders    Lipid panel            Immunizations and preventive care screenings were discussed with patient today. Appropriate education was printed on patient's after visit summary. Counseling:  Alcohol/drug use: discussed moderation in alcohol intake, the recommendations for healthy alcohol use, and avoidance of illicit drug use. Dental Health: discussed importance of regular tooth brushing, flossing, and dental visits. Injury prevention: discussed safety/seat belts, safety helmets, smoke detectors, carbon dioxide detectors, and smoking near bedding or upholstery. Sexual health: discussed sexually transmitted diseases, partner selection, use of condoms, avoidance of unintended pregnancy, and contraceptive alternatives. Exercise: the importance of regular exercise/physical activity was discussed. Recommend exercise 3-5 times per week for at least 30 minutes. BMI Counseling: Body mass index is 29.42 kg/m².  The BMI is above normal. Nutrition recommendations include decreasing portion sizes and moderation in carbohydrate intake. Exercise recommendations include exercising 3-5 times per week. Rationale for BMI follow-up plan is due to patient being overweight or obese. Depression Screening and Follow-up Plan: Patient was screened for depression during today's encounter. They screened negative with a PHQ-2 score of 2. Return in 1 year (on 11/8/2024) for Annual physical.     Chief Complaint:     Chief Complaint   Patient presents with    Physical Exam     Work physical       History of Present Illness:     Adult Annual Physical   Patient here for a comprehensive physical exam. The patient reports no problems. She is doing well LDL was 122 and HDL slightly low will repeat   Patient had colonsocopy and had polyp will be having repeat   Diet and Physical Activity  Diet/Nutrition: well balanced diet and consuming 3-5 servings of fruits/vegetables daily. Exercise: no formal exercise. Depression Screening  PHQ-2/9 Depression Screening    Little interest or pleasure in doing things: 1 - several days  Feeling down, depressed, or hopeless: 1 - several days  PHQ-2 Score: 2  PHQ-2 Interpretation: Negative depression screen       General Health  Sleep: gets 7-8 hours of sleep on average. Hearing: normal - bilateral.  Vision: goes for regular eye exams, most recent eye exam <1 year ago, and wears glasses. Dental: regular dental visits and brushes teeth twice daily. /GYN Health  Patient is: perimenopausal  Last menstrual period: 10/14/2023  Contraceptive method: oral contraceptives. Advanced Care Planning  Do you have an advanced directive? no  Do you have a durable medical power of ? no     Review of Systems:     Review of Systems   Constitutional:  Negative for fatigue, fever and unexpected weight change. HENT:  Negative for congestion, sinus pain and trouble swallowing. Eyes:  Negative for discharge and visual disturbance.    Respiratory:  Negative for cough, chest tightness, shortness of breath and wheezing. Cardiovascular:  Negative for chest pain, palpitations and leg swelling. Gastrointestinal:  Negative for abdominal pain, blood in stool, constipation, diarrhea, nausea and vomiting. Genitourinary:  Negative for difficulty urinating, dysuria, frequency and hematuria. Musculoskeletal:  Negative for arthralgias, gait problem and joint swelling. Skin:  Negative for rash and wound. Allergic/Immunologic: Negative for environmental allergies and food allergies. Neurological:  Negative for dizziness, syncope, weakness, numbness and headaches. Hematological:  Negative for adenopathy. Does not bruise/bleed easily. Psychiatric/Behavioral:  Negative for confusion, decreased concentration and sleep disturbance. The patient is not nervous/anxious. Past Medical History:     Past Medical History:   Diagnosis Date    Abnormal Pap smear of cervix     Acne     Anxiety     ASCUS with positive high risk HPV cervical     Resolved: 10/23/2017    BRBPR (bright red blood per rectum)     Cancer (HCC)     pre-cancerous, cervical     Cellulitis of right foot 2017    LEN III (cervical intraepithelial neoplasia grade III) with severe dysplasia 09/06/2017    Constipation     "significant"    Diverticulosis     External hemorrhoids 11/15/2018    Family hx of colon cancer     Paternal uncle    GERD (gastroesophageal reflux disease)     Herpes     Hirsutism     HPV (human papilloma virus) infection     Hx of bleeding disorder     DUB    Psychiatric disorder     anxiety    S/P endometrial ablation 04/23/2019    Urinary tract infection       Past Surgical History:     Past Surgical History:   Procedure Laterality Date    COLPOSCOPY      OVARIAN CYST REMOVAL      WI COLONOSCOPY FLX DX W/COLLJ SPEC WHEN PFRMD N/A 1/22/2019    Procedure: COLONOSCOPY with polypectomy;  Surgeon: Bard Stu MD;  Location: [facility];   Service: General    WI HYSTEROSCOPY DIAGNOSTIC SEPARATE PROCEDURE N/A 4/23/2019    Procedure: HYSTEROSCOPY;  Surgeon: Kali Liriano MD;  Location: [facility];  Service: Gynecology    NE HYSTEROSCOPY ENDOMETRIAL ABLATION N/A 4/23/2019    Procedure: ABLATION ENDOMETRIAL;  Surgeon: Kali Liriano MD;  Location: [facility];  Service: Gynecology    TUBAL LIGATION      WISDOM TOOTH EXTRACTION        Social History:     Social History     Socioeconomic History    Marital status:      Spouse name: None    Number of children: 3    Years of education: None    Highest education level: None   Occupational History    None   Tobacco Use    Smoking status: Never    Smokeless tobacco: Never   Vaping Use    Vaping Use: Never used   Substance and Sexual Activity    Alcohol use: Yes     Comment: once every few months    Drug use: No    Sexual activity: Not Currently     Partners: Male     Birth control/protection: OCP   Other Topics Concern    None   Social History Narrative    Caffeine Use    Uses Safety Equipment-seatbelts     Social Determinants of Health     Financial Resource Strain: Low Risk  (11/8/2023)    Overall Financial Resource Strain (CARDIA)     Difficulty of Paying Living Expenses: Not very hard   Food Insecurity: Not on file   Transportation Needs: No Transportation Needs (11/8/2023)    PRAPARE - Transportation     Lack of Transportation (Medical): No     Lack of Transportation (Non-Medical):  No   Physical Activity: Not on file   Stress: Not on file   Social Connections: Not on file   Intimate Partner Violence: Not on file   Housing Stability: Not on file      Family History:     Family History   Problem Relation Age of Onset    Hyperlipidemia Mother     Hypertension Mother     Diabetes Mother     Coronary artery disease Father     No Known Problems Daughter     No Known Problems Daughter     No Known Problems Daughter     Breast cancer Paternal Grandmother 80    Alzheimer's disease Paternal Grandfather     No Known Problems Paternal Aunt     No Known Problems Paternal Aunt     Colon cancer Paternal Uncle     Brain cancer Paternal Uncle     No Known Problems Half-Sister     Autoimmune disease Half-Sister     No Known Problems Half-Sister     No Known Problems Half-Sister     No Known Problems Half-Sister     No Known Problems Half-Brother     Skin cancer Half-Brother       Current Medications:     Current Outpatient Medications   Medication Sig Dispense Refill    docusate sodium (COLACE) 100 mg capsule TAKE 1 CAPSULE BY MOUTH TWICE A DAY 30 capsule 0    famotidine (PEPCID) 40 MG tablet TAKE 1 TABLET BY MOUTH EVERY DAY 90 tablet 1    hydrocortisone (WESTCORT) 0.2 % cream Apply topically 2 (two) times a day 45 g 0    Multiple Vitamins-Minerals (MULTIVITAMIN GUMMIES ADULT PO) Take 2 tablets by mouth daily      norethindrone (MICRONOR) 0.35 MG tablet Take 1 tablet (0.35 mg total) by mouth daily 84 tablet 3    valACYclovir (VALTREX) 1,000 mg tablet Take 1 tablet (1,000 mg total) by mouth daily 90 tablet 3     No current facility-administered medications for this visit. Allergies: Allergies   Allergen Reactions    Cephalosporins Hives      Physical Exam:     /78   Ht 5' 4" (1.626 m)   Wt 77.7 kg (171 lb 6.4 oz)   BMI 29.42 kg/m²     Physical Exam  Vitals and nursing note reviewed. Constitutional:       Appearance: Normal appearance. She is well-developed. HENT:      Head: Normocephalic and atraumatic. Right Ear: External ear normal.      Left Ear: External ear normal.      Nose: Nose normal.      Mouth/Throat:      Pharynx: No oropharyngeal exudate. Eyes:      Extraocular Movements: Extraocular movements intact. Conjunctiva/sclera: Conjunctivae normal.      Pupils: Pupils are equal, round, and reactive to light. Neck:      Thyroid: No thyromegaly. Trachea: No tracheal deviation. Cardiovascular:      Rate and Rhythm: Normal rate and regular rhythm. Heart sounds: Normal heart sounds.    Pulmonary:      Effort: Pulmonary effort is normal. No respiratory distress. Breath sounds: Normal breath sounds. No wheezing or rales. Abdominal:      General: Bowel sounds are normal.      Palpations: Abdomen is soft. Musculoskeletal:         General: Normal range of motion. Cervical back: Normal range of motion and neck supple. Lymphadenopathy:      Cervical: No cervical adenopathy. Skin:     General: Skin is warm. Findings: No rash. Neurological:      General: No focal deficit present. Mental Status: She is alert and oriented to person, place, and time. Cranial Nerves: No cranial nerve deficit. Motor: No abnormal muscle tone. Psychiatric:         Mood and Affect: Mood normal.         Behavior: Behavior normal.         Thought Content:  Thought content normal.         Judgment: Judgment normal.          Gurmeet Schneider DO  [facility] PRIMARY CARE

## 2023-11-08 NOTE — ASSESSMENT & PLAN NOTE
Discussed screening tests with patient She has had colonoscopy and has a polyp patient is due for repeat Patient mammogram is up to date Patient PAP was done and is pending Patient to have fasting labs Discussed healthy diet and also immunizations with patient Patient declines covid and flu shot

## 2023-11-08 NOTE — PROGRESS NOTES
Chief Complaint   Patient presents with    Physical Exam     Work physical      Name: Sergey Ennis      : 1980      MRN: [id number]  Encounter Provider: Anil Horner DO  Encounter Date: 2023   Encounter department: [facility] PRIMARY CARE    Assessment & Plan     1. Encounter for immunization        Depression Screening and Follow-up Plan: Patient was screened for depression during today's encounter. They screened negative with a PHQ-2 score of 2.         Subjective      HPI  Review of Systems    Current Outpatient Medications on File Prior to Visit   Medication Sig    docusate sodium (COLACE) 100 mg capsule TAKE 1 CAPSULE BY MOUTH TWICE A DAY    famotidine (PEPCID) 40 MG tablet TAKE 1 TABLET BY MOUTH EVERY DAY    hydrocortisone (WESTCORT) 0.2 % cream Apply topically 2 (two) times a day    Multiple Vitamins-Minerals (MULTIVITAMIN GUMMIES ADULT PO) Take 2 tablets by mouth daily    norethindrone (MICRONOR) 0.35 MG tablet Take 1 tablet (0.35 mg total) by mouth daily    valACYclovir (VALTREX) 1,000 mg tablet Take 1 tablet (1,000 mg total) by mouth daily    clindamycin (CLEOCIN T) 1 % APPLY TO AREAS OF ACNE ONCE DAILY AS NEEDED (Patient not taking: Reported on 2023)    doxycycline (PERIOSTAT) 20 MG tablet Take 20 mg by mouth 2 (two) times a day (Patient not taking: Reported on 2023)    Ivermectin (SOOLANTRA EX) Apply topically (Patient not taking: Reported on 2023)    polyethylene glycol (GLYCOLAX) powder 2 tablespoons in 8 oz water daily (Patient not taking: Reported on 2023)    tacrolimus (PROTOPIC) 0.1 % ointment Apply TO affected AREAS twice daily FOR SIX WEEKS (Patient not taking: Reported on 2023)       Objective     /78   Ht 5' 4" (1.626 m)   Wt 77.7 kg (171 lb 6.4 oz)   BMI 29.42 kg/m²     Physical Exam  Anil Horner DO

## 2023-11-09 LAB
LAB AP GYN PRIMARY INTERPRETATION: NORMAL
Lab: NORMAL

## 2024-01-07 PROBLEM — Z13.1 SCREENING FOR DIABETES MELLITUS: Status: RESOLVED | Noted: 2023-11-08 | Resolved: 2024-01-07

## 2024-01-20 LAB
25(OH)D3 SERPL-MCNC: 35 NG/ML (ref 30–100)
ALBUMIN SERPL-MCNC: 4.7 G/DL (ref 3.6–5.1)
ALBUMIN/GLOB SERPL: 1.9 (CALC) (ref 1–2.5)
ALP SERPL-CCNC: 42 U/L (ref 31–125)
ALT SERPL-CCNC: 22 U/L (ref 6–29)
AST SERPL-CCNC: 22 U/L (ref 10–30)
BILIRUB SERPL-MCNC: 1.6 MG/DL (ref 0.2–1.2)
BUN SERPL-MCNC: 11 MG/DL (ref 7–25)
BUN/CREAT SERPL: ABNORMAL (CALC) (ref 6–22)
CALCIUM SERPL-MCNC: 10.1 MG/DL (ref 8.6–10.2)
CHLORIDE SERPL-SCNC: 106 MMOL/L (ref 98–110)
CHOLEST SERPL-MCNC: 205 MG/DL
CHOLEST/HDLC SERPL: 5.9 (CALC)
CO2 SERPL-SCNC: 28 MMOL/L (ref 20–32)
CREAT SERPL-MCNC: 0.81 MG/DL (ref 0.5–0.99)
GFR/BSA.PRED SERPLBLD CYS-BASED-ARV: 93 ML/MIN/1.73M2
GLOBULIN SER CALC-MCNC: 2.5 G/DL (CALC) (ref 1.9–3.7)
GLUCOSE SERPL-MCNC: 87 MG/DL (ref 65–99)
HDLC SERPL-MCNC: 35 MG/DL
LDLC SERPL CALC-MCNC: 144 MG/DL (CALC)
NONHDLC SERPL-MCNC: 170 MG/DL (CALC)
POTASSIUM SERPL-SCNC: 4 MMOL/L (ref 3.5–5.3)
PROT SERPL-MCNC: 7.2 G/DL (ref 6.1–8.1)
SODIUM SERPL-SCNC: 143 MMOL/L (ref 135–146)
TRIGL SERPL-MCNC: 131 MG/DL

## 2024-02-06 ENCOUNTER — OFFICE VISIT (OUTPATIENT)
Dept: GYNECOLOGY | Facility: CLINIC | Age: 44
End: 2024-02-06
Payer: COMMERCIAL

## 2024-02-06 VITALS — SYSTOLIC BLOOD PRESSURE: 130 MMHG | BODY MASS INDEX: 29.01 KG/M2 | DIASTOLIC BLOOD PRESSURE: 72 MMHG | WEIGHT: 169 LBS

## 2024-02-06 DIAGNOSIS — N63.23 MASS OF LOWER OUTER QUADRANT OF LEFT BREAST: ICD-10-CM

## 2024-02-06 DIAGNOSIS — Z20.2 POSSIBLE EXPOSURE TO STD: Primary | ICD-10-CM

## 2024-02-06 DIAGNOSIS — R92.1 BREAST CALCIFICATIONS: ICD-10-CM

## 2024-02-06 PROBLEM — N63.20 LEFT BREAST MASS: Status: ACTIVE | Noted: 2023-04-18

## 2024-02-06 PROCEDURE — 99214 OFFICE O/P EST MOD 30 MIN: CPT | Performed by: OBSTETRICS & GYNECOLOGY

## 2024-02-06 PROCEDURE — 87491 CHLMYD TRACH DNA AMP PROBE: CPT | Performed by: OBSTETRICS & GYNECOLOGY

## 2024-02-06 PROCEDURE — 87591 N.GONORRHOEAE DNA AMP PROB: CPT | Performed by: OBSTETRICS & GYNECOLOGY

## 2024-02-06 NOTE — PROGRESS NOTES
Assessment/Plan   Diagnoses and all orders for this visit:    Mass of lower outer quadrant of left breast  -     US breast left limited (diagnostic); Future    Possible exposure to STD  -     HIV-1 RNA, quantitative, PCR; Future  -     RPR-Syphilis Screening (Total Syphilis IGG/IGM); Future  -     Hepatitis panel, acute; Future    Breast calcifications  -     US breast left limited (diagnostic); Future    1.  Left breast fullness-she noticed yesterday.  She denies any erythema or warmth or discharge.  Exam is notable for fibrocystic changes noted in this area at 4:00 in the left breast approximately 7 cm from the nipple.  No dominant masses appreciated, no warmth or erythema or discharge are noted.  Compared with the corresponding area in the right breast, is somewhat more prominent.  She has been getting imaging of this area with calcifications noted at 4:00.  She does have bilateral diagnostic mammogram scheduled 2/23/2024.  Would recommend she continue with this plan.  Also, left breast ultrasound was ordered.  She will follow-up 1 to 2 weeks after imaging so we can reassess and plan accordingly.  2.  Dense breast changes-as above.  Does have ABUS scheduled August 2024.  3.  STD-new partner for the past 2+ months.  She did have some type of oral ulcer which she did take a picture and I did see it on her right inner cheek.  It has resolved on exam today.  She is interested in STD testing.  GC/chlamydia done today.  Laboratory sheet given for HIV, RPR, hepatitis panel.  She will get this done.  4.  History of herpes-continues on Valtrex 1000 mg dosing for suppression.  Electronic prescription was sent previously.  She was counseled about possible effects of renal function with this.  She had recent CMP with normal creatinine.  She will continue suppression.  Did discuss that she had type II genitally, possibly had type I orally with this recent oral ulcer.  We did discuss herpes testing, she will defer at this  "time.  Others issues as per previous note.  She will follow-up in about 4 weeks time for breast check.    Subjective   Patient ID: Shanice Hair is a 43 y.o. female.    Vitals:    02/06/24 1420   BP: 130/72     Patient is seen today for follow-up visit.  Please see assessment plan for details.        The following portions of the patient's history were reviewed and updated as appropriate: allergies, current medications, past family history, past medical history, past social history, past surgical history, and problem list.  Past Medical History:   Diagnosis Date    Abnormal Pap smear of cervix     Acne     Anxiety     ASCUS with positive high risk HPV cervical     Resolved: 10/23/2017    BRBPR (bright red blood per rectum)     Cancer (HCC)     pre-cancerous, cervical     Cellulitis of right foot 2017    LEN III (cervical intraepithelial neoplasia grade III) with severe dysplasia 09/06/2017    Constipation     \"significant\"    Diverticulosis     External hemorrhoids 11/15/2018    Family hx of colon cancer     Paternal uncle    GERD (gastroesophageal reflux disease)     Herpes     Hirsutism     HPV (human papilloma virus) infection     Hx of bleeding disorder     DUB    Psychiatric disorder     anxiety    S/P endometrial ablation 04/23/2019    Urinary tract infection      Past Surgical History:   Procedure Laterality Date    COLPOSCOPY      OVARIAN CYST REMOVAL      DC COLONOSCOPY FLX DX W/COLLJ SPEC WHEN PFRMD N/A 1/22/2019    Procedure: COLONOSCOPY with polypectomy;  Surgeon: Jane Warren MD;  Location: [facility];  Service: General    DC HYSTEROSCOPY DIAGNOSTIC SEPARATE PROCEDURE N/A 4/23/2019    Procedure: HYSTEROSCOPY;  Surgeon: Hermes Solnao MD;  Location: [facility];  Service: Gynecology    DC HYSTEROSCOPY ENDOMETRIAL ABLATION N/A 4/23/2019    Procedure: ABLATION ENDOMETRIAL;  Surgeon: Hermes Solano MD;  Location: [facility];  Service: Gynecology    TUBAL LIGATION      WISDOM TOOTH EXTRACTION       OB " History    Para Term  AB Living   3 3 3     3   SAB IAB Ectopic Multiple Live Births           3      # Outcome Date GA Lbr Isai/2nd Weight Sex Delivery Anes PTL Lv   3 Term            2 Term            1 Term                Current Outpatient Medications:     docusate sodium (COLACE) 100 mg capsule, TAKE 1 CAPSULE BY MOUTH TWICE A DAY, Disp: 30 capsule, Rfl: 0    famotidine (PEPCID) 40 MG tablet, TAKE 1 TABLET BY MOUTH EVERY DAY, Disp: 90 tablet, Rfl: 1    hydrocortisone (WESTCORT) 0.2 % cream, Apply topically 2 (two) times a day, Disp: 45 g, Rfl: 0    Multiple Vitamins-Minerals (MULTIVITAMIN GUMMIES ADULT PO), Take 2 tablets by mouth daily, Disp: , Rfl:     norethindrone (MICRONOR) 0.35 MG tablet, Take 1 tablet (0.35 mg total) by mouth daily, Disp: 84 tablet, Rfl: 3    valACYclovir (VALTREX) 1,000 mg tablet, Take 1 tablet (1,000 mg total) by mouth daily, Disp: 90 tablet, Rfl: 3  Allergies   Allergen Reactions    Cephalosporins Hives     Social History     Socioeconomic History    Marital status:      Spouse name: None    Number of children: 3    Years of education: None    Highest education level: None   Occupational History    None   Tobacco Use    Smoking status: Never    Smokeless tobacco: Never   Vaping Use    Vaping status: Never Used   Substance and Sexual Activity    Alcohol use: Yes     Comment: once every few months    Drug use: No    Sexual activity: Not Currently     Partners: Male     Birth control/protection: OCP   Other Topics Concern    None   Social History Narrative    Caffeine Use    Uses Safety Equipment-seatbelts     Social Determinants of Health     Financial Resource Strain: Low Risk  (2023)    Overall Financial Resource Strain (CARDIA)     Difficulty of Paying Living Expenses: Not very hard   Food Insecurity: Not on file   Transportation Needs: No Transportation Needs (2023)    PRAPARE - Transportation     Lack of Transportation (Medical): No     Lack of  Transportation (Non-Medical): No   Physical Activity: Not on file   Stress: Not on file   Social Connections: Not on file   Intimate Partner Violence: Not on file   Housing Stability: Not on file     Family History   Problem Relation Age of Onset    Hyperlipidemia Mother     Hypertension Mother     Diabetes Mother     Coronary artery disease Father     No Known Problems Daughter     No Known Problems Daughter     No Known Problems Daughter     Breast cancer Paternal Grandmother 80    Alzheimer's disease Paternal Grandfather     No Known Problems Paternal Aunt     No Known Problems Paternal Aunt     Colon cancer Paternal Uncle     Brain cancer Paternal Uncle     No Known Problems Half-Sister     Autoimmune disease Half-Sister     No Known Problems Half-Sister     No Known Problems Half-Sister     No Known Problems Half-Sister     No Known Problems Half-Brother     Skin cancer Half-Brother        Review of Systems   Constitutional:  Negative for chills, diaphoresis, fatigue and fever.   Respiratory:  Negative for apnea, cough, chest tightness, shortness of breath and wheezing.    Cardiovascular:  Negative for chest pain, palpitations and leg swelling.   Gastrointestinal:  Negative for abdominal distention, abdominal pain, anal bleeding, constipation, diarrhea, nausea, rectal pain and vomiting.   Genitourinary:  Negative for difficulty urinating, dyspareunia, dysuria, frequency, hematuria, menstrual problem, pelvic pain, urgency, vaginal bleeding, vaginal discharge and vaginal pain.   Musculoskeletal:  Negative for arthralgias, back pain and myalgias.   Skin:  Negative for color change and rash.   Neurological:  Negative for dizziness, syncope, light-headedness, numbness and headaches.   Hematological:  Negative for adenopathy. Does not bruise/bleed easily.   Psychiatric/Behavioral:  Negative for dysphoric mood and sleep disturbance. The patient is not nervous/anxious.    Breast fullness    Objective   Physical  Exam  OBGyn Exam     Objective      /72 (BP Location: Left arm, Patient Position: Sitting)   Wt 76.7 kg (169 lb)   BMI 29.01 kg/m²     General:   alert and oriented, in no acute distress   Neck: normal to inspection and palpation   Breast: Right breast without any masses or lesions or discharge.  With some fibrocystic changes noted at 4:00, approximately 7 cm from the nipple.  No warmth or erythema or discharge or definite mass was noted.  Compared with the corresponding area on the right breast, is somewhat more prominent.   Heart:    Lungs:    Abdomen: soft, non-tender, without masses or organomegaly   Vulva: normal   Vagina: Without erythema or lesions or discharge.  Normal   Cervix: Without lesions or discharge or cervicitis.  No Cervical motion tenderness   Uterus: top normal size, anteverted, non-tender   Adnexa: no mass, fullness, tenderness   Rectum: deferred    Psych:  Normal mood and affect   Skin:  Without obvious lesions   Eyes: symmetric, with normal movements and reactivity   Musculoskeletal:  Normal muscle tone and movements appreciated

## 2024-02-07 LAB
C TRACH DNA SPEC QL NAA+PROBE: NEGATIVE
N GONORRHOEA DNA SPEC QL NAA+PROBE: NEGATIVE

## 2024-02-23 ENCOUNTER — HOSPITAL ENCOUNTER (OUTPATIENT)
Dept: ULTRASOUND IMAGING | Facility: CLINIC | Age: 44
Discharge: HOME | End: 2024-02-23
Payer: COMMERCIAL

## 2024-02-23 ENCOUNTER — HOSPITAL ENCOUNTER (OUTPATIENT)
Dept: MAMMOGRAPHY | Facility: CLINIC | Age: 44
Discharge: HOME | End: 2024-02-23
Payer: COMMERCIAL

## 2024-02-23 VITALS — WEIGHT: 169 LBS | BODY MASS INDEX: 28.85 KG/M2 | HEIGHT: 64 IN

## 2024-02-23 DIAGNOSIS — N63.23 MASS OF LOWER OUTER QUADRANT OF LEFT BREAST: ICD-10-CM

## 2024-02-23 DIAGNOSIS — Z12.31 VISIT FOR SCREENING MAMMOGRAM: ICD-10-CM

## 2024-02-23 DIAGNOSIS — R92.8 MAMMOGRAM ABNORMAL: ICD-10-CM

## 2024-02-23 DIAGNOSIS — R92.1 BREAST CALCIFICATIONS: ICD-10-CM

## 2024-02-23 PROCEDURE — 77066 DX MAMMO INCL CAD BI: CPT

## 2024-02-23 PROCEDURE — G0279 TOMOSYNTHESIS, MAMMO: HCPCS

## 2024-02-23 PROCEDURE — 76642 ULTRASOUND BREAST LIMITED: CPT

## 2024-03-05 ENCOUNTER — OFFICE VISIT (OUTPATIENT)
Dept: FAMILY MEDICINE CLINIC | Facility: CLINIC | Age: 44
End: 2024-03-05
Payer: COMMERCIAL

## 2024-03-05 VITALS
WEIGHT: 166.8 LBS | HEIGHT: 64 IN | TEMPERATURE: 96.8 F | OXYGEN SATURATION: 99 % | BODY MASS INDEX: 28.48 KG/M2 | DIASTOLIC BLOOD PRESSURE: 80 MMHG | HEART RATE: 72 BPM | SYSTOLIC BLOOD PRESSURE: 120 MMHG | RESPIRATION RATE: 16 BRPM

## 2024-03-05 DIAGNOSIS — H10.32 ACUTE CONJUNCTIVITIS OF LEFT EYE, UNSPECIFIED ACUTE CONJUNCTIVITIS TYPE: Primary | ICD-10-CM

## 2024-03-05 PROCEDURE — 99213 OFFICE O/P EST LOW 20 MIN: CPT | Performed by: FAMILY MEDICINE

## 2024-03-05 RX ORDER — POLYMYXIN B SULFATE AND TRIMETHOPRIM 1; 10000 MG/ML; [USP'U]/ML
1 SOLUTION OPHTHALMIC EVERY 4 HOURS
Qty: 10 ML | Refills: 0 | Status: SHIPPED | OUTPATIENT
Start: 2024-03-05

## 2024-03-05 NOTE — PROGRESS NOTES
Name: Shanice Hair      : 1980      MRN: [id number]  Encounter Provider: Terell Del Cid MD  Encounter Date: 3/5/2024   Encounter department: [facility]    Assessment & Plan     Treat patient with Polytrim drops to be used every 4 hours for the next 5 to 7 days.  Work note given.  Practice good hand hygiene.  RTC as needed    1. Acute conjunctivitis of left eye, unspecified acute conjunctivitis type  -     polymyxin b-trimethoprim (POLYTRIM) ophthalmic solution; Administer 1 drop into the left eye every 4 (four) hours           Subjective      She presents today to discuss a 2-day history of left eye discharge and redness.  Denies any changes to her vision.    Conjunctivitis   Associated symptoms include eye discharge and eye redness. Pertinent negatives include no fever, no eye itching, no photophobia, no abdominal pain, no constipation, no diarrhea, no nausea, no vomiting, no congestion, no headaches, no rhinorrhea, no sore throat, no cough, no wheezing, no rash and no eye pain.     Review of Systems   Constitutional:  Negative for activity change, appetite change, chills, fatigue and fever.   HENT:  Negative for congestion, rhinorrhea, sneezing and sore throat.    Eyes:  Positive for discharge and redness. Negative for photophobia, pain, itching and visual disturbance.   Respiratory:  Negative for cough, chest tightness, shortness of breath and wheezing.    Cardiovascular:  Negative for chest pain and palpitations.   Gastrointestinal:  Negative for abdominal distention, abdominal pain, constipation, diarrhea, nausea and vomiting.   Musculoskeletal:  Negative for arthralgias, back pain, joint swelling and myalgias.   Skin:  Negative for rash.   Neurological:  Negative for dizziness, weakness, numbness and headaches.   Hematological:  Negative for adenopathy.   Psychiatric/Behavioral:  Negative for confusion and dysphoric mood.    All other systems reviewed and are  "negative.      Current Outpatient Medications on File Prior to Visit   Medication Sig   • docusate sodium (COLACE) 100 mg capsule TAKE 1 CAPSULE BY MOUTH TWICE A DAY (Patient taking differently: Take 100 mg by mouth in the morning)   • famotidine (PEPCID) 40 MG tablet TAKE 1 TABLET BY MOUTH EVERY DAY   • hydrocortisone (WESTCORT) 0.2 % cream Apply topically 2 (two) times a day   • Multiple Vitamins-Minerals (MULTIVITAMIN GUMMIES ADULT PO) Take 2 tablets by mouth daily   • norethindrone (MICRONOR) 0.35 MG tablet Take 1 tablet (0.35 mg total) by mouth daily   • valACYclovir (VALTREX) 1,000 mg tablet Take 1 tablet (1,000 mg total) by mouth daily       Objective     /80   Pulse 72   Temp (!) 96.8 °F (36 °C) (Temporal)   Resp 16   Ht 5' 4\" (1.626 m)   Wt 75.7 kg (166 lb 12.8 oz)   SpO2 99%   BMI 28.63 kg/m²     Physical Exam  Constitutional:       General: She is not in acute distress.     Appearance: She is well-developed. She is not toxic-appearing or diaphoretic.   HENT:      Head: Normocephalic and atraumatic.      Nose: Nose normal.      Mouth/Throat:      Pharynx: No oropharyngeal exudate.   Eyes:      General: No scleral icterus.        Right eye: No discharge.         Left eye: Discharge present.  Cardiovascular:      Rate and Rhythm: Normal rate and regular rhythm.      Pulses: Normal pulses.      Heart sounds: Normal heart sounds. No murmur heard.  Pulmonary:      Effort: Pulmonary effort is normal. No respiratory distress.      Breath sounds: Normal breath sounds. No wheezing.   Musculoskeletal:         General: No tenderness. Normal range of motion.   Skin:     General: Skin is warm and dry.      Coloration: Skin is not pale.      Findings: No erythema.   Neurological:      Mental Status: She is alert.   Psychiatric:         Mood and Affect: Mood normal.         Behavior: Behavior normal.       Terell Del Cid MD    "

## 2024-03-05 NOTE — LETTER
March 5, 2024     Patient: Shanice Hair  YOB: 1980  Date of Visit: 3/5/2024      To Whom it May Concern:    Shanice Hair is under my professional care. Shanice was seen in my office on 3/5/2024. Shanice may return to work on 3/7/2024 .    If you have any questions or concerns, please don't hesitate to call.         Sincerely,          Delroy Del Cid MD        CC: No Recipients

## 2024-03-08 ENCOUNTER — OFFICE VISIT (OUTPATIENT)
Dept: GYNECOLOGY | Facility: CLINIC | Age: 44
End: 2024-03-08
Payer: COMMERCIAL

## 2024-03-08 VITALS — SYSTOLIC BLOOD PRESSURE: 120 MMHG | WEIGHT: 167.4 LBS | DIASTOLIC BLOOD PRESSURE: 90 MMHG | BODY MASS INDEX: 28.73 KG/M2

## 2024-03-08 DIAGNOSIS — A60.04 HERPES SIMPLEX VULVOVAGINITIS: ICD-10-CM

## 2024-03-08 DIAGNOSIS — R92.1 BREAST CALCIFICATIONS: ICD-10-CM

## 2024-03-08 DIAGNOSIS — N63.25 MASS OVERLAPPING MULTIPLE QUADRANTS OF LEFT BREAST: Primary | ICD-10-CM

## 2024-03-08 DIAGNOSIS — R92.333 HETEROGENEOUSLY DENSE TISSUE OF BOTH BREASTS ON MAMMOGRAPHY: ICD-10-CM

## 2024-03-08 DIAGNOSIS — N60.19 FIBROCYSTIC BREAST CHANGES, UNSPECIFIED LATERALITY: ICD-10-CM

## 2024-03-08 PROBLEM — N63.20 LEFT BREAST MASS: Status: RESOLVED | Noted: 2023-04-18 | Resolved: 2024-03-08

## 2024-03-08 PROCEDURE — 99213 OFFICE O/P EST LOW 20 MIN: CPT | Performed by: OBSTETRICS & GYNECOLOGY

## 2024-03-08 NOTE — PROGRESS NOTES
Assessment/Plan   Diagnoses and all orders for this visit:    Mass overlapping multiple quadrants of left breast    Herpes simplex vulvovaginitis    Fibrocystic breast changes, unspecified laterality    Breast calcifications    Heterogeneously dense tissue of both breasts on mammography    1.  Left breast fullness-patient denies any significant symptoms at this time.  The previous fibrocystic change at 4:00 of the left breast approximately 7 cm from the nipple has resolved.  She did have bilateral diagnostic mammogram and left breast ultrasound on 2/23/2024 with stable calcifications noted at 4:00, ultrasound with 8 mm cyst noted at 3:00 in the periareolar area.  This is not definitively palpable on exam today.  She was reassured by the findings and the recent imaging.  She will continue to follow and call return with any issues.  2.  Dense breast-continue with 3D imaging.  She does have ABUS scheduled 2024 and she will plan to proceed with this.  3.  STD-had GC/chlamydia along with HIV, RPR, hepatitis panel all of which were negative.  4. history of herpes-continue Valtrex 1000 mg dosing for suppression.  Again, reviewed possible effects on kidney function with long-term use.  Recent CMP with normal creatinine.  5. previous menorrhagia-status post endometrial elation with Carlita April 2019.  Notes rare light bleeding every few months or so  6. Contraception-tubal ligation  7. history of cervical dysplasia-status post LEEP 2017 for LEN 2-3.  Pap with HPV negative from 11/2/2023 visit.  8. family history of breast cancer  9. prior history of UTI-no current symptoms.  Follow-up November 2024 for yearly exam scheduled or as needed.    Subjective   Patient ID: Shanice Hair is a 43 y.o. female.    Vitals:    03/08/24 1431   BP: 120/90     HPI    The following portions of the patient's history were reviewed and updated as appropriate: allergies, current medications, past family history, past medical history, past social  "history, past surgical history, and problem list.  Past Medical History:   Diagnosis Date    Abnormal Pap smear of cervix     Acne     Anxiety     ASCUS with positive high risk HPV cervical     Resolved: 10/23/2017    BRBPR (bright red blood per rectum)     Cancer (HCC)     pre-cancerous, cervical     Cellulitis of right foot     LEN III (cervical intraepithelial neoplasia grade III) with severe dysplasia 2017    Constipation     \"significant\"    Diverticulosis     External hemorrhoids 11/15/2018    Family hx of colon cancer     Paternal uncle    GERD (gastroesophageal reflux disease)     Herpes     Hirsutism     HPV (human papilloma virus) infection     Hx of bleeding disorder     DUB    Psychiatric disorder     anxiety    S/P endometrial ablation 2019    Urinary tract infection      Past Surgical History:   Procedure Laterality Date    COLPOSCOPY      OVARIAN CYST REMOVAL      ND COLONOSCOPY FLX DX W/COLLJ SPEC WHEN PFRMD N/A 2019    Procedure: COLONOSCOPY with polypectomy;  Surgeon: Jane Warren MD;  Location: [facility];  Service: General    ND HYSTEROSCOPY DIAGNOSTIC SEPARATE PROCEDURE N/A 2019    Procedure: HYSTEROSCOPY;  Surgeon: Hermes Solano MD;  Location: [facility];  Service: Gynecology    ND HYSTEROSCOPY ENDOMETRIAL ABLATION N/A 2019    Procedure: ABLATION ENDOMETRIAL;  Surgeon: Hermes Solano MD;  Location: [facility];  Service: Gynecology    TUBAL LIGATION      WISDOM TOOTH EXTRACTION       OB History    Para Term  AB Living   3 3 3     3   SAB IAB Ectopic Multiple Live Births           3      # Outcome Date GA Lbr Isai/2nd Weight Sex Delivery Anes PTL Lv   3 Term            2 Term            1 Term                Current Outpatient Medications:     docusate sodium (COLACE) 100 mg capsule, TAKE 1 CAPSULE BY MOUTH TWICE A DAY (Patient taking differently: Take 100 mg by mouth in the morning), Disp: 30 capsule, Rfl: 0    famotidine (PEPCID) 40 MG tablet, TAKE " 1 TABLET BY MOUTH EVERY DAY, Disp: 90 tablet, Rfl: 1    hydrocortisone (WESTCORT) 0.2 % cream, Apply topically 2 (two) times a day, Disp: 45 g, Rfl: 0    Multiple Vitamins-Minerals (MULTIVITAMIN GUMMIES ADULT PO), Take 2 tablets by mouth daily, Disp: , Rfl:     norethindrone (MICRONOR) 0.35 MG tablet, Take 1 tablet (0.35 mg total) by mouth daily, Disp: 84 tablet, Rfl: 3    polymyxin b-trimethoprim (POLYTRIM) ophthalmic solution, Administer 1 drop into the left eye every 4 (four) hours, Disp: 10 mL, Rfl: 0    valACYclovir (VALTREX) 1,000 mg tablet, Take 1 tablet (1,000 mg total) by mouth daily, Disp: 90 tablet, Rfl: 3  Allergies   Allergen Reactions    Cephalosporins Hives     Social History     Socioeconomic History    Marital status:      Spouse name: None    Number of children: 3    Years of education: None    Highest education level: None   Occupational History    None   Tobacco Use    Smoking status: Never    Smokeless tobacco: Never   Vaping Use    Vaping status: Never Used   Substance and Sexual Activity    Alcohol use: Yes     Comment: once every few months    Drug use: No    Sexual activity: Not Currently     Partners: Male     Birth control/protection: OCP   Other Topics Concern    None   Social History Narrative    Caffeine Use    Uses Safety Equipment-seatbelts     Social Determinants of Health     Financial Resource Strain: Low Risk  (11/8/2023)    Overall Financial Resource Strain (CARDIA)     Difficulty of Paying Living Expenses: Not very hard   Food Insecurity: Not on file   Transportation Needs: No Transportation Needs (11/8/2023)    PRAPARE - Transportation     Lack of Transportation (Medical): No     Lack of Transportation (Non-Medical): No   Physical Activity: Not on file   Stress: Not on file   Social Connections: Not on file   Intimate Partner Violence: Not on file   Housing Stability: Not on file     Family History   Problem Relation Age of Onset    Hyperlipidemia Mother      Hypertension Mother     Diabetes Mother     Coronary artery disease Father     No Known Problems Daughter     No Known Problems Daughter     No Known Problems Daughter     Breast cancer Paternal Grandmother 80    Alzheimer's disease Paternal Grandfather     No Known Problems Paternal Aunt     No Known Problems Paternal Aunt     Colon cancer Paternal Uncle     Brain cancer Paternal Uncle     No Known Problems Half-Sister     Autoimmune disease Half-Sister     No Known Problems Half-Sister     No Known Problems Half-Sister     No Known Problems Half-Sister     No Known Problems Half-Brother     Skin cancer Half-Brother        Review of Systems    Objective   Physical Exam  OBGyn Exam     Objective      /90   Wt 75.9 kg (167 lb 6.4 oz)   BMI 28.73 kg/m²     General:   alert and oriented, in no acute distress   Neck: Without thyromegaly or lymphadenopathy   Breast: Fibrocystic changes noted bilaterally, without any dominant masses or lesions or discharge appreciated.  Previous asymmetric change at 4:00 7 cm from the nipple has resolved.   Heart:    Lungs:    Abdomen:    Vulva:    Vagina:    Cervix:    Uterus:    Adnexa:    Rectum:     Psych:  Normal mood and affect   Skin:  Without obvious lesions   Eyes: symmetric, with normal movements and reactivity   Musculoskeletal:  Normal muscle tone and movements appreciated

## 2024-03-22 DIAGNOSIS — K21.9 GASTROESOPHAGEAL REFLUX DISEASE WITHOUT ESOPHAGITIS: ICD-10-CM

## 2024-03-22 RX ORDER — FAMOTIDINE 40 MG/1
TABLET, FILM COATED ORAL
Qty: 90 TABLET | Refills: 1 | Status: SHIPPED | OUTPATIENT
Start: 2024-03-22

## 2024-07-12 ENCOUNTER — TELEPHONE (OUTPATIENT)
Age: 44
End: 2024-07-12

## 2024-07-12 NOTE — TELEPHONE ENCOUNTER
Patient would like to schedule colonoscopy.  Was told it should be done every 5 years due to polyp, last one was January 2019.  Please call her at , thanks.

## 2024-08-09 ENCOUNTER — OFFICE VISIT (OUTPATIENT)
Dept: FAMILY MEDICINE CLINIC | Facility: CLINIC | Age: 44
End: 2024-08-09
Payer: COMMERCIAL

## 2024-08-09 VITALS
WEIGHT: 170 LBS | RESPIRATION RATE: 16 BRPM | BODY MASS INDEX: 29.02 KG/M2 | SYSTOLIC BLOOD PRESSURE: 118 MMHG | DIASTOLIC BLOOD PRESSURE: 80 MMHG | TEMPERATURE: 97.3 F | OXYGEN SATURATION: 98 % | HEIGHT: 64 IN | HEART RATE: 76 BPM

## 2024-08-09 DIAGNOSIS — M54.41 ACUTE RIGHT-SIDED LOW BACK PAIN WITH RIGHT-SIDED SCIATICA: Primary | ICD-10-CM

## 2024-08-09 PROCEDURE — 99213 OFFICE O/P EST LOW 20 MIN: CPT | Performed by: FAMILY MEDICINE

## 2024-08-09 RX ORDER — NAPROXEN 500 MG/1
TABLET ORAL
Qty: 60 TABLET | Refills: 1 | Status: SHIPPED | OUTPATIENT
Start: 2024-08-09

## 2024-08-09 NOTE — PROGRESS NOTES
Ambulatory Visit  Name: Shanice Hair      : 1980      MRN: [id number]  Encounter Provider: Shantal Smith DO  Encounter Date: 2024   Encounter department: [facility]    Assessment & Plan   1. Acute right-sided low back pain with right-sided sciatica  Assessment & Plan:  Will try naprosyn twice daily for 10 days then as needed refer to PT   Orders:  -     Ambulatory Referral to Physical Therapy; Future  -     naproxen (Naprosyn) 500 mg tablet; 1 tablet twice daily for 10 days then as needed       Chief Complaint   Patient presents with    Sciatica     Sciatic pain began in January       History of Present Illness     Patient is here for right sided low back pain that radiates down the right leg she notes it is every day She is doing stretches Patient is not taking anything for it Patient has no bowel or bladder control patient notes pain is worse after she sits for some time and then gets up Patient has pain since January     Back Pain  This is a chronic problem. Episode onset: January. The problem occurs intermittently. The problem has been gradually worsening since onset. The pain is present in the lumbar spine. The quality of the pain is described as aching. The pain radiates to the right thigh. The pain is at a severity of 6/10. The pain is moderate. The pain is The same all the time. The symptoms are aggravated by bending and sitting. Stiffness is present In the morning. Pertinent negatives include no abdominal pain, bladder incontinence, bowel incontinence, chest pain, dysuria, fever, headaches, leg pain, numbness, paresis, paresthesias, pelvic pain, perianal numbness, tingling, weakness or weight loss. Risk factors include lack of exercise. She has tried nothing for the symptoms. The treatment provided no relief.       Review of Systems   Constitutional:  Negative for fever and weight loss.   Cardiovascular:  Negative for chest pain.   Gastrointestinal:  Negative for  "abdominal pain and bowel incontinence.   Genitourinary:  Negative for bladder incontinence, dysuria and pelvic pain.   Musculoskeletal:  Positive for back pain.   Neurological:  Negative for tingling, weakness, numbness, headaches and paresthesias.       Objective     /80 (BP Location: Left arm, Patient Position: Sitting, Cuff Size: Large)   Pulse 76   Temp (!) 97.3 °F (36.3 °C)   Resp 16   Ht 5' 4\" (1.626 m)   Wt 77.1 kg (170 lb)   SpO2 98%   BMI 29.18 kg/m²     Physical Exam  Vitals and nursing note reviewed.   Constitutional:       Appearance: Normal appearance.   Musculoskeletal:      Comments: Mild pain right PSIS Patient has full ROM but has pain with the extremes of motion Patient has normal toe and heel walk She has negative straight leg raising She has 5/5 strength both legs    Skin:     Findings: No rash.   Neurological:      General: No focal deficit present.      Mental Status: She is alert and oriented to person, place, and time.   Psychiatric:         Mood and Affect: Mood normal.         Behavior: Behavior normal.         Thought Content: Thought content normal.         Judgment: Judgment normal.       Administrative Statements     "

## 2024-08-13 ENCOUNTER — OFFICE VISIT (OUTPATIENT)
Dept: SURGERY | Facility: CLINIC | Age: 44
End: 2024-08-13
Payer: COMMERCIAL

## 2024-08-13 VITALS
WEIGHT: 169.4 LBS | SYSTOLIC BLOOD PRESSURE: 117 MMHG | BODY MASS INDEX: 28.92 KG/M2 | DIASTOLIC BLOOD PRESSURE: 77 MMHG | HEIGHT: 64 IN | HEART RATE: 63 BPM

## 2024-08-13 DIAGNOSIS — Z12.11 ENCOUNTER FOR SCREENING COLONOSCOPY: Primary | ICD-10-CM

## 2024-08-13 PROCEDURE — 99243 OFF/OP CNSLTJ NEW/EST LOW 30: CPT | Performed by: SURGERY

## 2024-08-13 NOTE — PROGRESS NOTES
"Ambulatory Visit  Name: Shanice Hair      : 1980      MRN: [id number]  Encounter Provider: Chilango Munson MD  Encounter Date: 2024   Encounter department: [facility] GENERAL SURGERY [place]    Assessment & Plan   1. Encounter for screening colonoscopy  Assessment & Plan:  I reviewed her last colonoscopy from 5 years ago.  She had 1 tubular adenoma in the cecum.  It has been 5 years.  Will plan for screening colonoscopy at [facility].  The risks benefits alternative explained to her and she is agreeable to proceed  Orders:  -     Colonoscopy; Future; Expected date: 2024      History of Present Illness     Shanice Hair is a 43 y.o. female who presents for evaluation for a colonoscopy.  She had one 5 years ago at the time was having some bright red blood per rectum.  Found to be more hemorrhoidal bleeding.  She had a cecal polyp at that time was a tubular adenoma.  Since then her bowel movements are more regulated but she still occasionally has issues with bleeding and hemorrhoids.    Review of Systems   Constitutional:  Negative for chills, fever and unexpected weight change.   HENT:  Negative for congestion, sore throat and trouble swallowing.    Eyes:  Negative for discharge and itching.   Respiratory:  Negative for cough, shortness of breath and wheezing.    Cardiovascular:  Negative for chest pain and leg swelling.   Endocrine: Negative for cold intolerance and heat intolerance.   Genitourinary:  Negative for difficulty urinating, dysuria and frequency.   Musculoskeletal:  Negative for arthralgias, gait problem and joint swelling.   Skin:  Negative for color change and wound.   Neurological:  Negative for dizziness, numbness and headaches.   Psychiatric/Behavioral:  Negative for agitation and confusion. The patient is not nervous/anxious.        Objective     /77 (BP Location: Left arm, Patient Position: Sitting, Cuff Size: Large)   Pulse 63   Ht 5' 4\" (1.626 m)   Wt " 76.8 kg (169 lb 6.4 oz)   BMI 29.08 kg/m²     Physical Exam  Vitals and nursing note reviewed.   Constitutional:       General: She is not in acute distress.     Appearance: She is well-developed. She is not diaphoretic.   HENT:      Head: Normocephalic and atraumatic.   Eyes:      Pupils: Pupils are equal, round, and reactive to light.   Cardiovascular:      Rate and Rhythm: Normal rate and regular rhythm.   Pulmonary:      Effort: Pulmonary effort is normal. No respiratory distress.   Abdominal:      General: Bowel sounds are normal.      Palpations: Abdomen is soft.   Musculoskeletal:         General: Normal range of motion.      Cervical back: Normal range of motion and neck supple.   Skin:     General: Skin is warm and dry.   Neurological:      Mental Status: She is alert and oriented to person, place, and time.   Psychiatric:         Behavior: Behavior normal.       Administrative Statements

## 2024-08-13 NOTE — ASSESSMENT & PLAN NOTE
I reviewed her last colonoscopy from 5 years ago.  She had 1 tubular adenoma in the cecum.  It has been 5 years.  Will plan for screening colonoscopy at [facility].  The risks benefits alternative explained to her and she is agreeable to proceed

## 2024-08-14 ENCOUNTER — EVALUATION (OUTPATIENT)
Dept: PHYSICAL THERAPY | Facility: CLINIC | Age: 44
End: 2024-08-14
Payer: COMMERCIAL

## 2024-08-14 VITALS — DIASTOLIC BLOOD PRESSURE: 80 MMHG | SYSTOLIC BLOOD PRESSURE: 115 MMHG

## 2024-08-14 DIAGNOSIS — M54.41 ACUTE RIGHT-SIDED LOW BACK PAIN WITH RIGHT-SIDED SCIATICA: ICD-10-CM

## 2024-08-14 PROCEDURE — 97110 THERAPEUTIC EXERCISES: CPT

## 2024-08-14 PROCEDURE — 97161 PT EVAL LOW COMPLEX 20 MIN: CPT

## 2024-08-14 NOTE — PROGRESS NOTES
PT Evaluation     Today's date: 2024  Patient name: Shanice Hair  : 1980  MRN: [id number]  Referring provider: Shantal Smith DO  Dx:   Encounter Diagnosis     ICD-10-CM    1. Acute right-sided low back pain with right-sided sciatica  M54.41 Ambulatory Referral to Physical Therapy          Start Time: 0800  Stop Time: 0845  Total time in clinic (min): 45 minutes    Assessment  Impairments: abnormal or restricted ROM, activity intolerance, impaired physical strength, pain with function and weight-bearing intolerance  Symptom irritability: moderate    Assessment details: Shanice is a 42 yo female presenting to OP PT secondary to diagnosis of Low back pain with onset 2024, no AIME or trauma. Pt's pain begins below line of PSIS along glute/piriformis region and triggered by HS/Piriformis stretch. Pt reports functional limitations as follows difficulty getting out of sitting position when seated for too long, intermittent pain with forward bending, or repeated heavier lifting.. Patient demonstrates b/l hip strength deficits R LE strength >deficits, abdominal endurance deficits, and postural deficits in standing/sitting. Pt would benefit from skilled PT to address aforementioned deficits and improve return to prior level of function.    Understanding of Dx/Px/POC: good     Prognosis: good    Goals  Pt will demonstrate [place] with progressive home exercise program to improve carryover and decrease recurrence of symptoms.  Pt will demonstrate 20% improvement in FOTO score to increase tolerance to functional activities   Pt will demonstrate 10-20 deg improvement in Right hip IR/ER AROM to increase tolerance to reaching for items off the floor and improve ease with repeated squatting and turning when walking  Pt will demonstrate tolerance to lifting medium weights (up to 15Ibs) with min to no discomfort to improve ease with carrying groceries  Pt will demonstrate 5/5 in LE strength to allow  for improved tolerance to prolonged amb/standing      Plan  Patient would benefit from: PT eval and skilled physical therapy  Planned modality interventions: TENS, manual electrical stimulation, low level laser therapy, unattended electrical stimulation, thermotherapy: hydrocollator packs and cryotherapy    Planned therapy interventions: IASTM, joint mobilization, kinesiology taping, manual therapy, massage, Danielle taping, neuromuscular re-education, strengthening, stretching, therapeutic activities and therapeutic exercise    Frequency: 2x week  Plan of Care beginning date: 2024  Plan of Care expiration date: 10/9/2024  Treatment plan discussed with: patient        Subjective Evaluation    History of Present Illness  Mechanism of injury: Shanice is a 42 yo female presenting to OP PT secondary to right sided low back pain with onset January. The problem has been gradually worsening since onset. Aggravators are bending, lifting moderate weights,  and sitting. Stiffness is present In the morning and relieved with prolonged amb. She reports sx are increased intermittently with getting out of her chair after sitting for longe period of time, forward and sustained bending. Pt additionally reports intermittent right posterior thigh pain which mid thigh. Pt reports she take tylenol, stretching, and walking decreases pain.   Quality of life: good    Patient Goals  Patient goals for therapy: increased strength and decreased pain  Patient goal: Not getting up an having pain out of my chair  Pain  Current pain ratin  At best pain ratin  At worst pain ratin  Location: Right Posterior hip/thigh  Quality: sharp  Relieving factors: rest, medications and change in position  Progression: worsening    Social Support  Stairs in house: yes   Lives in: multiple-level home  Lives with: young children    Employment status: working  Hand dominance: right      Diagnostic Tests  No diagnostic tests  performed  Treatments  No previous or current treatments        Objective     Concurrent Complaints  Negative for night pain, disturbed sleep, bladder dysfunction, bowel dysfunction and saddle (S4) numbness    Active Range of Motion     Lumbar   Flexion:  WFL  Extension:  with pain  Left lateral flexion:  WFL  Right lateral flexion:  WFL  Left rotation:  WFL  Right rotation:  WFL    Additional Active Range of Motion Details  Repeated flexion juttering     Joint Play   Joints within functional limits: L1, L2, L3, L4, L5 and S1     Strength/Myotome Testing     Lumbar   Left   Heel walk: normal  Toe walk: normal    Right   Heel walk: normal  Toe walk: normal    Left Hip   Planes of Motion   Flexion: 4  Extension: 4-  Abduction: 4-  External rotation: 4-  Internal rotation: 4    Right Hip   Planes of Motion   Flexion: 4  Extension: 4-  Abduction: 4-  External rotation: 3+  Internal rotation: 4-    Left Knee   Flexion: 4  Extension: 5    Right Knee   Flexion: 4  Extension: 5    Left Ankle/Foot   Dorsiflexion: 5  Plantar flexion: 5    Right Ankle/Foot   Dorsiflexion: 5  Plantar flexion: 5    Tests     Lumbar   Negative SIJ compression, sacroiliac distraction, Gaenslen's  and sacral thrust .     Left   Negative crossed SLR and passive SLR.     Right   Positive passive SLR.   Negative crossed SLR.     Left Pelvic Girdle/Sacrum   Negative: thigh thrust and active SLR test.     Right Pelvic Girdle/Sacrum   Negative: thigh thrust and active SLR test.     Right Hip   Positive JORGE, FADIR and piriformis.   Negative scour, SI compression, SI distraction and sign of the buttock.   90/90 SLR: Positive.       Flowsheet Rows      Flowsheet Row Most Recent Value   PT/OT G-Codes    Current Score 70   Projected Score 74               Precautions:   Past Medical History:   Diagnosis Date    Abnormal Pap smear of cervix     Acne     Anxiety     ASCUS with positive high risk HPV cervical     Resolved: 10/23/2017    BRBPR (bright red blood  "per rectum)     Cancer (HCC)     pre-cancerous, cervical     Cellulitis of right foot 2017    LEN III (cervical intraepithelial neoplasia grade III) with severe dysplasia 09/06/2017    Constipation     \"significant\"    Diverticulosis     External hemorrhoids 11/15/2018    Family hx of colon cancer     Paternal uncle    GERD (gastroesophageal reflux disease)     Herpes     Hirsutism     HPV (human papilloma virus) infection     Hx of bleeding disorder     DUB    Psychiatric disorder     anxiety    S/P endometrial ablation 04/23/2019    Urinary tract infection      Date 8/14          Visit # 1          FOTO done          Re-eval                 Manuals 8/14            P to A L/S                          STM  paraspinalis Right piriformis                          Neuro Re-Ed             PPT 10''x10            PPT march NV            PPT rev march NV            Dead bug NV            Seated and standing PPT NV            PPT with squats/STS NV            Bird dog              Quad donkey kicks NV            Quad Fire hydtrant NV                                                                Ther Ex             Bike             S/L SLR 2x10            Prone SLR             Piriformis str 3x30'' supine            Hamstring str 3x30'' supine            Hip IR str             Bridges w/ tband nv            SLR             Clamshells NV w/ tband            Hip flexor stretch NV            Nerve glid 90/90  NV                                                                Ther Activity                                       Gait Training                                       Modalities                                                 "

## 2024-08-19 ENCOUNTER — OFFICE VISIT (OUTPATIENT)
Dept: PHYSICAL THERAPY | Facility: CLINIC | Age: 44
End: 2024-08-19
Payer: COMMERCIAL

## 2024-08-19 DIAGNOSIS — M54.41 ACUTE RIGHT-SIDED LOW BACK PAIN WITH RIGHT-SIDED SCIATICA: Primary | ICD-10-CM

## 2024-08-19 PROCEDURE — 97110 THERAPEUTIC EXERCISES: CPT

## 2024-08-19 PROCEDURE — 97112 NEUROMUSCULAR REEDUCATION: CPT

## 2024-08-19 NOTE — PROGRESS NOTES
"Daily Note     Today's date: 2024  Patient name: Shanice Hair  : 1980  MRN: [id number]  Referring provider: Shantal Smith DO  Dx:   Encounter Diagnosis     ICD-10-CM    1. Acute right-sided low back pain with right-sided sciatica  M54.41                      Subjective: \"MY abs were sore- I did the exercises but probably not as much as I should have\"       Objective: See treatment diary below      Assessment: Shanice presents with LBP. No symptoms upon arrival today. Added gentle ROM and core activation to tolerance. Educated on nerve mobility. Cuing for slowed activation with exercises to activate core and maintain general stability.   Tolerated session without adverse effects. Recommend continued skilled therapy to improve overall strength and mobility for functional return with decreased compensation and pain.      Plan: Continue per plan of care.      Precautions:   Past Medical History:   Diagnosis Date    Abnormal Pap smear of cervix     Acne     Anxiety     ASCUS with positive high risk HPV cervical     Resolved: 10/23/2017    BRBPR (bright red blood per rectum)     Cancer (HCC)     pre-cancerous, cervical     Cellulitis of right foot     LEN III (cervical intraepithelial neoplasia grade III) with severe dysplasia 2017    Constipation     \"significant\"    Diverticulosis     External hemorrhoids 11/15/2018    Family hx of colon cancer     Paternal uncle    GERD (gastroesophageal reflux disease)     Herpes     Hirsutism     HPV (human papilloma virus) infection     Hx of bleeding disorder     DUB    Psychiatric disorder     anxiety    S/P endometrial ablation 2019    Urinary tract infection      Date          Visit # 1 2         FOTO done          Re-eval                 Manuals            P to A L/S                          STM  paraspinalis Right piriformis                          Neuro Re-Ed             PPT 10''x10 5\" x 15            PPT march NV X10 " "B            PPT rev march NV            Dead bug NV            Seated and standing PPT NV            PPT with squats/STS NV            Bird dog              Quad donkey kicks NV            Quad Fire hydtrant NV            Quad QL act   X10 B                                                   Ther Ex  8/19           Bike  5'            S/L SLR 2x10            Prone SLR             Piriformis str 3x30'' supine 3x30\" supine both ways            Hamstring str 3x30'' supine            Hip IR str             Bridges w/ tband nv 5\"x 15            SLR             Clamshells NV w/ tband 3\" x 15 b            Hip flexor stretch NV 3x30\" mod reynaldo            Nerve glid 90/90  NV 15           ITB stretch   Strap 3 x 20\"                                                   Ther Activity                                       Gait Training                                       Modalities                                                 "

## 2024-08-21 ENCOUNTER — OFFICE VISIT (OUTPATIENT)
Dept: PHYSICAL THERAPY | Facility: CLINIC | Age: 44
End: 2024-08-21
Payer: COMMERCIAL

## 2024-08-21 DIAGNOSIS — M54.41 ACUTE RIGHT-SIDED LOW BACK PAIN WITH RIGHT-SIDED SCIATICA: Primary | ICD-10-CM

## 2024-08-21 PROCEDURE — 97112 NEUROMUSCULAR REEDUCATION: CPT

## 2024-08-21 PROCEDURE — 97110 THERAPEUTIC EXERCISES: CPT

## 2024-08-21 NOTE — PROGRESS NOTES
"Daily Note     Today's date: 2024  Patient name: Shanice Hair  : 1980  MRN: [id number]  Referring provider: Shantal Smith DO  Dx:   Encounter Diagnosis     ICD-10-CM    1. Acute right-sided low back pain with right-sided sciatica  M54.41           Start Time: 0755  Stop Time: 0845  Total time in clinic (min): 50 minutes    Subjective: Patient reports some pain today from the buttock to the back of the knee.       Objective: See treatment diary below      Assessment: Patient started dead bugs today and exhibits proper form with minimal VC, modified progression of exercise to improve core activation and reduce hip flexor compensation, pt exhibited quick fatigue of core muscles. Patient progressed bridges and clams today and exhibits increased fatigue on R>L during clams. Patient educated on sciatic nerve glide modification depending on location on symptoms. Patient demonstrated fatigue post treatment, exhibited good technique with therapeutic exercises, and would benefit from continued PT.      Plan: Continue per plan of care.  Progress treatment as tolerated.      Session completed by SPT Jimmy Cortes under supervision from Sonya Galvez DPT.      Precautions:   Past Medical History:   Diagnosis Date    Abnormal Pap smear of cervix     Acne     Anxiety     ASCUS with positive high risk HPV cervical     Resolved: 10/23/2017    BRBPR (bright red blood per rectum)     Cancer (HCC)     pre-cancerous, cervical     Cellulitis of right foot 2017    LEN III (cervical intraepithelial neoplasia grade III) with severe dysplasia 2017    Constipation     \"significant\"    Diverticulosis     External hemorrhoids 11/15/2018    Family hx of colon cancer     Paternal uncle    GERD (gastroesophageal reflux disease)     Herpes     Hirsutism     HPV (human papilloma virus) infection     Hx of bleeding disorder     DUB    Psychiatric disorder     anxiety    S/P endometrial ablation 2019    Urinary tract " "infection      Date 8/14 8/19 8/21        Visit # 1 2 3        FOTO done          Re-eval                 Manuals 8/14 8/19 8/21          P to A L/S                          STM  paraspinalis Right piriformis                          Neuro Re-Ed  8/19 8/21          PPT 10''x10 5\" x 15            PPT march NV X10 B  2x10 5\"          PPT rev march NV            Dead bug NV  10x           Seated and standing PPT NV            PPT with squats/STS NV            Bird dog              Quad donkey kicks NV            Quad Fire hydtrant NV            Quad QL act   X10 B            Bird dog   15x LE only                                     Ther Ex  8/19 8/21          Bike  5'  6'          S/L SLR 2x10            Prone SLR             Piriformis str 3x30'' supine 3x30\" supine both ways  supine both ways           Hamstring str 3x30'' supine  3x30\" supine          Hip IR str             Bridges w/ tband nv 5\"x 15  2x10 3\" PTB          SLR             Clamshells NV w/ tband 3\" x 15 b  2x10 3\" PTB          Hip flexor stretch NV 3x30\" mod reynaldo            Nerve glid 90/90  NV 15 20x          ITB stretch   Strap 3 x 20\"  3x30\"           Leg press             Cat camel   10x                        Ther Activity                                       Gait Training                                       Modalities                                                   "

## 2024-08-23 ENCOUNTER — HOSPITAL ENCOUNTER (OUTPATIENT)
Dept: ULTRASOUND IMAGING | Facility: CLINIC | Age: 44
Discharge: HOME | End: 2024-08-23
Payer: COMMERCIAL

## 2024-08-23 VITALS — WEIGHT: 169 LBS | BODY MASS INDEX: 28.85 KG/M2 | HEIGHT: 64 IN

## 2024-08-23 DIAGNOSIS — R92.333 HETEROGENEOUSLY DENSE TISSUE OF BOTH BREASTS ON MAMMOGRAPHY: ICD-10-CM

## 2024-08-23 PROCEDURE — 76641 ULTRASOUND BREAST COMPLETE: CPT

## 2024-08-27 ENCOUNTER — OFFICE VISIT (OUTPATIENT)
Dept: PHYSICAL THERAPY | Facility: CLINIC | Age: 44
End: 2024-08-27
Payer: COMMERCIAL

## 2024-08-27 DIAGNOSIS — M54.41 ACUTE RIGHT-SIDED LOW BACK PAIN WITH RIGHT-SIDED SCIATICA: Primary | ICD-10-CM

## 2024-08-27 PROCEDURE — 97110 THERAPEUTIC EXERCISES: CPT

## 2024-08-27 PROCEDURE — 97112 NEUROMUSCULAR REEDUCATION: CPT

## 2024-08-27 NOTE — PROGRESS NOTES
"Daily Note     Today's date: 2024  Patient name: Shanice Hair  : 1980  MRN: [id number]  Referring provider: Shantal Smith DO  Dx:   Encounter Diagnosis     ICD-10-CM    1. Acute right-sided low back pain with right-sided sciatica  M54.41           Start Time: 0800  Stop Time: 0845  Total time in clinic (min): 45 minutes    Subjective: Pt requests to hold on bike today due to discomfort anterior knee. She reports no pain in her back today.       Objective: See treatment diary below      Assessment: Shanice tolerated treatment well with consistent cuing throughout. TE's were performed with the same resistance and reps. New TE's were demonstrated with proper technique, and tolerated well. Following treatment, the patient demonstrated fatigue post treatment, exhibited good technique with therapeutic exercises, and would benefit from continued PT.         Plan: Continue per plan of care.      Precautions:   Past Medical History:   Diagnosis Date    Abnormal Pap smear of cervix     Acne     Anxiety     ASCUS with positive high risk HPV cervical     Resolved: 10/23/2017    BRBPR (bright red blood per rectum)     Cancer (HCC)     pre-cancerous, cervical     Cellulitis of right foot     LEN III (cervical intraepithelial neoplasia grade III) with severe dysplasia 2017    Constipation     \"significant\"    Diverticulosis     External hemorrhoids 11/15/2018    Family hx of colon cancer     Paternal uncle    GERD (gastroesophageal reflux disease)     Herpes     Hirsutism     HPV (human papilloma virus) infection     Hx of bleeding disorder     DUB    Psychiatric disorder     anxiety    S/P endometrial ablation 2019    Urinary tract infection      Date        Visit # 1 2 3 4       FOTO done          Re-eval                 Manuals          P to A L/S                          STM  paraspinalis Right piriformis                          Neuro Re-Ed   " "8/27         PPT 10''x10 5\" x 15   5''x15         PPT march NV X10 B  2x10 5\" 2x10 5''         PPT rev march NV            Dead bug NV  10x  15x ea         Seated and standing PPT NV            PPT with squats/STS NV            Quad donkey kicks NV   15x ea          Quad Fire hydtrant NV            Quad QL act   X10 B            Bird dog   15x LE only  15x                                    Ther Ex  8/19 8/21 8/27         Bike  5'  6' held         S/L SLR 2x10            Prone SLR    2x10         Piriformis str 3x30'' supine 3x30\" supine both ways  supine both ways  3x30'' ea          Hamstring str 3x30'' supine  3x30\" supine 3x30'' ea         Hip IR str             Bridges w/ tband nv 5\"x 15  2x10 3\" PTB 2x10 3\" PTB         SLR    W/ ppt 3x10          Clamshells NV w/ tband 3\" x 15 b  2x10 3\" PTB 2x10 3\" PTB         Hip flexor stretch NV 3x30\" mod reynaldo   Prone 3x30''         Nerve glid 90/90  NV 15 20x No sx          ITB stretch   Strap 3 x 20\"  3x30\"           Leg press             Cat camel   10x  15x5''                       Ther Activity                                       Gait Training                                       Modalities                                                     "

## 2024-08-29 ENCOUNTER — OFFICE VISIT (OUTPATIENT)
Dept: PHYSICAL THERAPY | Facility: CLINIC | Age: 44
End: 2024-08-29
Payer: COMMERCIAL

## 2024-08-29 DIAGNOSIS — M54.41 ACUTE RIGHT-SIDED LOW BACK PAIN WITH RIGHT-SIDED SCIATICA: Primary | ICD-10-CM

## 2024-08-29 PROCEDURE — 97112 NEUROMUSCULAR REEDUCATION: CPT

## 2024-08-29 PROCEDURE — 97110 THERAPEUTIC EXERCISES: CPT

## 2024-08-29 NOTE — PROGRESS NOTES
"Daily Note     Today's date: 2024  Patient name: Shanice Hair  : 1980  MRN: [id number]  Referring provider: Shantal Smith DO  Dx:   Encounter Diagnosis     ICD-10-CM    1. Acute right-sided low back pain with right-sided sciatica  M54.41           Start Time: 0800  Stop Time: 0840  Total time in clinic (min): 40 minutes    Subjective: Pt denies any medical updates since her last session, and reports no pain in low back at the start of her session.       Objective: See treatment diary below      Assessment: Tolerated treatment well. Pt is given minor cues on form throughout session. She is able to complete exercises without increase in sx. Patient demonstrated fatigue post treatment, exhibited good technique with therapeutic exercises, and would benefit from continued PT      Plan: Continue per plan of care.      Precautions:   Past Medical History:   Diagnosis Date    Abnormal Pap smear of cervix     Acne     Anxiety     ASCUS with positive high risk HPV cervical     Resolved: 10/23/2017    BRBPR (bright red blood per rectum)     Cancer (HCC)     pre-cancerous, cervical     Cellulitis of right foot     LEN III (cervical intraepithelial neoplasia grade III) with severe dysplasia 2017    Constipation     \"significant\"    Diverticulosis     External hemorrhoids 11/15/2018    Family hx of colon cancer     Paternal uncle    GERD (gastroesophageal reflux disease)     Herpes     Hirsutism     HPV (human papilloma virus) infection     Hx of bleeding disorder     DUB    Psychiatric disorder     anxiety    S/P endometrial ablation 2019    Urinary tract infection      Date       Visit # 1 2 3 4 5      FOTO done    done      Re-eval                 Manuals         P to A L/S                          STM  paraspinalis Right piriformis                          Neuro Re-Ed          PPT 10''x10 5\" x 15   5''x15 5''x15        PPT " "march NV X10 B  2x10 5\" 2x10 5'' 2x10, 5''        PPT rev march NV            Dead bug NV  10x  15x ea 15x ea        Seated and standing PPT NV            PPT with squats/STS NV            Quad donkey kicks NV            Quad Fire hydtrant NV   15x ea  15x ea         Quad QL act   X10 B            Bird dog   15x LE only  15x  15x                                   Ther Ex  8/19 8/21 8/27 8/29        Bike  5'  6' held held        S/L SLR 2x10            Prone SLR    2x10 3x10        Piriformis str 3x30'' supine 3x30\" supine both ways  supine both ways  3x30'' ea  3x30'' ea         Hamstring str 3x30'' supine  3x30\" supine 3x30'' ea 3x30'' ea         Hip IR str             Bridges w/ tband nv 5\"x 15  2x10 3\" PTB 2x10 3\" PTB 2x10 3'' GTB        SLR    W/ ppt 3x10  W/ ppt 3x10         Clamshells NV w/ tband 3\" x 15 b  2x10 3\" PTB 2x10 3\" PTB 2x10 3'' GTB        Hip flexor stretch NV 3x30\" mod reynaldo   Prone 3x30'' Prone 3x30''        Nerve glid 90/90  NV 15 20x No sx  No sx        ITB stretch   Strap 3 x 20\"  3x30\"           Leg press             Cat camel   10x  15x5''  15x5''                     Ther Activity                                       Gait Training                                       Modalities                                                       "

## 2024-09-04 ENCOUNTER — OFFICE VISIT (OUTPATIENT)
Dept: PHYSICAL THERAPY | Facility: CLINIC | Age: 44
End: 2024-09-04
Payer: COMMERCIAL

## 2024-09-04 DIAGNOSIS — M54.41 ACUTE RIGHT-SIDED LOW BACK PAIN WITH RIGHT-SIDED SCIATICA: Primary | ICD-10-CM

## 2024-09-04 PROCEDURE — 97110 THERAPEUTIC EXERCISES: CPT

## 2024-09-04 PROCEDURE — 97112 NEUROMUSCULAR REEDUCATION: CPT

## 2024-09-04 NOTE — PROGRESS NOTES
"Daily Note     Today's date: 2024  Patient name: Shanice Hair  : 1980  MRN: [id number]  Referring provider: Shantal Smith DO  Dx:   Encounter Diagnosis     ICD-10-CM    1. Acute right-sided low back pain with right-sided sciatica  M54.41                      Subjective: Pt presents to PT reporting a \"little bit of pain over the weekend\" but states she performed stretches and the pain dissipated.  Pt denies pain today.  Pt denies pain post PT session.       Objective: See treatment diary below      Assessment: Tolerated treatment well. Pt requested to no warm up on bike secondary to knee pain; warmed up on TM per pt request.  Added wand to squats to educate pt on proper bending and lifting. Pt required minor cuing for correct technique.  Issued Tbands for palloff and bridge TE.   Patient demonstrated fatigue post treatment, exhibited good technique with therapeutic exercises, and would benefit from continued PT to increase flexibility, strength and function.      Plan: Continue per plan of care.  Issue HEP NV.     Precautions:   Past Medical History:   Diagnosis Date    Abnormal Pap smear of cervix     Acne     Anxiety     ASCUS with positive high risk HPV cervical     Resolved: 10/23/2017    BRBPR (bright red blood per rectum)     Cancer (HCC)     pre-cancerous, cervical     Cellulitis of right foot     LEN III (cervical intraepithelial neoplasia grade III) with severe dysplasia 2017    Constipation     \"significant\"    Diverticulosis     External hemorrhoids 11/15/2018    Family hx of colon cancer     Paternal uncle    GERD (gastroesophageal reflux disease)     Herpes     Hirsutism     HPV (human papilloma virus) infection     Hx of bleeding disorder     DUB    Psychiatric disorder     anxiety    S/P endometrial ablation 2019    Urinary tract infection      Date      Visit # 1 2 3 4 5 6     FOTO done    done      Re-eval                 Manuals  " "8/19 8/21 8/27 8/29 9/4       P to A L/S                          STM  paraspinalis Right piriformis                          Neuro Re-Ed  8/19 8/21 8/27 8/29 9/4       PPT 10''x10 5\" x 15   5''x15 5''x15 5''x10       PPT march NV X10 B  2x10 5\" 2x10 5'' 2x10, 5'' x10, 5''       PPT rev march NV     2x10       Dead bug NV  10x  15x ea 15x ea        Seated and standing PPT NV            PPT with squats/STS NV     C wand 15x       Quad donkey kicks NV            Quad Fire hydtrant NV   15x ea  15x ea         Quad QL act   X10 B            Bird dog   15x LE only  15x  15x         Palloff press      5\" x10                    Ther Ex  8/19 8/21 8/27 8/29 9/4       Bike  5'  6' held held D/C       Treadmill      6'       SLR c PPT      15 x 2 ea       S/L SLR 2x10     2 x 10       Prone SLR    2x10 3x10 3 x10       Piriformis str 3x30'' supine 3x30\" supine both ways  supine both ways  3x30'' ea  3x30'' ea  3x30'' ea        Hamstring str 3x30'' supine  3x30\" supine 3x30'' ea 3x30'' ea  3x30'' ea        Hip IR str             Bridges w/ tband nv 5\"x 15  2x10 3\" PTB 2x10 3\" PTB 2x10 3'' GTB 2x10 3'' blueTB       SLR    W/ ppt 3x10  W/ ppt 3x10  W/ ppt 3x10        Clamshells NV w/ tband 3\" x 15 b  2x10 3\" PTB 2x10 3\" PTB 2x10 3'' GTB        Hip flexor stretch NV 3x30\" mod reynaldo   Prone 3x30'' Prone 3x30''        Nerve glid 90/90  NV 15 20x No sx  No sx No sx       ITB stretch   Strap 3 x 20\"  3x30\"           Leg press             Cat camel   10x  15x5''  15x5''                     Ther Activity      9/4                                 Gait Training                                       Modalities                                                         "

## 2024-09-06 ENCOUNTER — OFFICE VISIT (OUTPATIENT)
Dept: PHYSICAL THERAPY | Facility: CLINIC | Age: 44
End: 2024-09-06
Payer: COMMERCIAL

## 2024-09-06 DIAGNOSIS — M54.41 ACUTE RIGHT-SIDED LOW BACK PAIN WITH RIGHT-SIDED SCIATICA: Primary | ICD-10-CM

## 2024-09-06 PROCEDURE — 97112 NEUROMUSCULAR REEDUCATION: CPT

## 2024-09-06 PROCEDURE — 97110 THERAPEUTIC EXERCISES: CPT

## 2024-09-06 NOTE — PROGRESS NOTES
"Daily Note     Today's date: 2024  Patient name: Shanice Hair  : 1980  MRN: [id number]  Referring provider: Shantal Smith DO  Dx:   Encounter Diagnosis     ICD-10-CM    1. Acute right-sided low back pain with right-sided sciatica  M54.41                      Subjective: Pt presents to PT reporting \"little bit of pain\" in R low back for unknown reason. She did state she was out all day yesterday and was exhausted at the end of the day.  Pt denies pain post PT session.       Objective: See treatment diary below      Assessment: Pt demonstrates good tolerance to progressions.  She continues to work on stability requiring occ cuing throughout session. Educated and issued pt an updated HEP; she reports a verbal understanding.  Patient demonstrated fatigue post treatment, exhibited good technique with therapeutic exercises, and would benefit from continued PT to increase flexibility, strength and function.      Plan: Continue per plan of care.      Precautions:   Past Medical History:   Diagnosis Date    Abnormal Pap smear of cervix     Acne     Anxiety     ASCUS with positive high risk HPV cervical     Resolved: 10/23/2017    BRBPR (bright red blood per rectum)     Cancer (HCC)     pre-cancerous, cervical     Cellulitis of right foot     LEN III (cervical intraepithelial neoplasia grade III) with severe dysplasia 2017    Constipation     \"significant\"    Diverticulosis     External hemorrhoids 11/15/2018    Family hx of colon cancer     Paternal uncle    GERD (gastroesophageal reflux disease)     Herpes     Hirsutism     HPV (human papilloma virus) infection     Hx of bleeding disorder     DUB    Psychiatric disorder     anxiety    S/P endometrial ablation 2019    Urinary tract infection      Date     Visit # 1 2 3 4 5 6 7    FOTO done    done      Re-eval                 Manuals       P to A L/S                        " "  STM  paraspinalis Right piriformis                          Neuro Re-Ed  8/19 8/21 8/27 8/29 9/4 9/6      PPT 10''x10 5\" x 15   5''x15 5''x15 5''x10 5\" x 10      PPT march NV X10 B  2x10 5\" 2x10 5'' 2x10, 5'' x10, 5'' 5\" x 10      PPT rev march NV     2x10 2 x 10      Dead bug NV  10x  15x ea 15x ea        Seated and standing PPT NV            PPT with squats/STS NV     C wand 15x C wand 15x      Quad donkey kicks NV            Quad Fire hydtrant NV   15x ea  15x ea   15x      Quad QL act   X10 B            Bird dog   15x LE only  15x  15x   15x      Palloff press      5\" x10 5\" x10                   Ther Ex  8/19 8/21 8/27 8/29 9/4 9/6      Bike  5'  6' held held D/C --      Treadmill      6' 6'      SLR c PPT      15 x 2 ea 15 x 2 ea      S/L SLR 2x10     2 x 10 3 x 10      Prone SLR    2x10 3x10 3 x10 3 x 10      Piriformis str 3x30'' supine 3x30\" supine both ways  supine both ways  3x30'' ea  3x30'' ea  3x30'' ea  3x30'' ea       Hamstring str 3x30'' supine  3x30\" supine 3x30'' ea 3x30'' ea  3x30'' ea        Hip IR str             Bridges w/ tband nv 5\"x 15  2x10 3\" PTB 2x10 3\" PTB 2x10 3'' GTB 2x10 3'' blueTB 2x10 3'' blueTB      SLR    W/ ppt 3x10  W/ ppt 3x10  W/ ppt 3x10  W/ ppt 3x10       Clamshells NV w/ tband 3\" x 15 b  2x10 3\" PTB 2x10 3\" PTB 2x10 3'' GTB  2x10 3'' blueTB      Hip flexor stretch NV 3x30\" mod reynaldo   Prone 3x30'' Prone 3x30''        Nerve glid 90/90  NV 15 20x No sx  No sx No sx       ITB stretch   Strap 3 x 20\"  3x30\"           Leg press             Cat camel   10x  15x5''  15x5''                     Ther Activity      9/4 9/6                                Gait Training                                       Modalities                                                           "

## 2024-09-11 ENCOUNTER — OFFICE VISIT (OUTPATIENT)
Dept: PHYSICAL THERAPY | Facility: CLINIC | Age: 44
End: 2024-09-11
Payer: COMMERCIAL

## 2024-09-11 DIAGNOSIS — M54.41 ACUTE RIGHT-SIDED LOW BACK PAIN WITH RIGHT-SIDED SCIATICA: Primary | ICD-10-CM

## 2024-09-11 PROCEDURE — 97110 THERAPEUTIC EXERCISES: CPT

## 2024-09-11 PROCEDURE — 97112 NEUROMUSCULAR REEDUCATION: CPT

## 2024-09-11 NOTE — PROGRESS NOTES
"Daily Note     Today's date: 2024  Patient name: Shanice Hair  : 1980  MRN: [id number]  Referring provider: Shantal Smith DO  Dx:   Encounter Diagnosis     ICD-10-CM    1. Acute right-sided low back pain with right-sided sciatica  M54.41                      Subjective: Pt presents to PT reporting some pain over the weekend stating it may be increased walking or the way to sits while performing her hobby and R foot placement.  Pt denies pain post PT session.      Objective: See treatment diary below      Assessment: Pt demonstrates good tolerance to progressions.  Noted improvement with core stability. Pt demonstrates tightness with B hip ER, R>L.   Patient demonstrated fatigue post treatment, exhibited good technique with therapeutic exercises, and would benefit from continued PT to increase flexibility, strength and function.      Plan: Continue per plan of care.      Precautions:   Past Medical History:   Diagnosis Date    Abnormal Pap smear of cervix     Acne     Anxiety     ASCUS with positive high risk HPV cervical     Resolved: 10/23/2017    BRBPR (bright red blood per rectum)     Cancer (HCC)     pre-cancerous, cervical     Cellulitis of right foot     LEN III (cervical intraepithelial neoplasia grade III) with severe dysplasia 2017    Constipation     \"significant\"    Diverticulosis     External hemorrhoids 11/15/2018    Family hx of colon cancer     Paternal uncle    GERD (gastroesophageal reflux disease)     Herpes     Hirsutism     HPV (human papilloma virus) infection     Hx of bleeding disorder     DUB    Psychiatric disorder     anxiety    S/P endometrial ablation 2019    Urinary tract infection      Date    Visit # 1 2 3 4 5 6 7 8   FOTO done    done      Re-eval                 Manuals      P to A L/S                          STM  paraspinalis Right piriformis                          Neuro " "Re-Ed  8/19 8/21 8/27 8/29 9/4 9/6 9/11     PPT 10''x10 5\" x 15   5''x15 5''x15 5''x10 5\" x 10 5\" x 10     PPT march NV X10 B  2x10 5\" 2x10 5'' 2x10, 5'' x10, 5'' 5\" x 10 np     PPT rev march NV     2x10 2 x 10 2 x 10     Dead bug NV  10x  15x ea 15x ea        Seated and standing PPT NV            PPT with squats/STS NV     C wand 15x C wand 15x C wand 15x     Quad donkey kicks NV            Quad Fire hydtrant NV   15x ea  15x ea   15x 15x     Quad QL act   X10 B            Bird dog   15x LE only  15x  15x   15x 15x     Palloff press      5\" x10 5\" x10 5\" x10                  Ther Ex  8/19 8/21 8/27 8/29 9/4 9/6 9/11     Bike  5'  6' held held D/C -- --     Treadmill      6' 6' 6'     SLR c PPT      15 x 2 ea 15 x 2 ea 15 x 2 ea 2#     S/L SLR 2x10     2 x 10 3 x 10 15 x 2 ea 2#     Prone SLR    2x10 3x10 3 x10 3 x 10 15 x 2 ea 2#     Piriformis str 3x30'' supine 3x30\" supine both ways  supine both ways  3x30'' ea  3x30'' ea  3x30'' ea  3x30'' ea  3x30'' ea      Hamstring str 3x30'' supine  3x30\" supine 3x30'' ea 3x30'' ea  3x30'' ea        Hip IR str             Bridges w/ tband nv 5\"x 15  2x10 3\" PTB 2x10 3\" PTB 2x10 3'' GTB 2x10 3'' blueTB 2x10 3'' blueTB 2x10 3'' blueTB     SLR    W/ ppt 3x10  W/ ppt 3x10  W/ ppt 3x10  W/ ppt 3x10  -- -- --   Clamshells NV w/ tband 3\" x 15 b  2x10 3\" PTB 2x10 3\" PTB 2x10 3'' GTB  2x10 3'' blueTB 2x10 3'' blueTB     Hip flexor stretch NV 3x30\" mod reynaldo   Prone 3x30'' Prone 3x30''        Nerve glid 90/90  NV 15 20x No sx  No sx No sx No sx No sx     ITB stretch   Strap 3 x 20\"  3x30\"           Leg press             Cat camel   10x  15x5''  15x5''   5\" x20                  Ther Activity      9/4 9/6 9/11                               Gait Training                                       Modalities                                                             "

## 2024-09-13 ENCOUNTER — OFFICE VISIT (OUTPATIENT)
Dept: PHYSICAL THERAPY | Facility: CLINIC | Age: 44
End: 2024-09-13
Payer: COMMERCIAL

## 2024-09-13 DIAGNOSIS — M54.41 ACUTE RIGHT-SIDED LOW BACK PAIN WITH RIGHT-SIDED SCIATICA: Primary | ICD-10-CM

## 2024-09-13 PROCEDURE — 97112 NEUROMUSCULAR REEDUCATION: CPT

## 2024-09-13 PROCEDURE — 97110 THERAPEUTIC EXERCISES: CPT

## 2024-09-13 NOTE — PROGRESS NOTES
"Daily Note     Today's date: 2024  Patient name: Shanice Hair  : 1980  MRN: [id number]  Referring provider: Shantal Smith DO  Dx:   Encounter Diagnosis     ICD-10-CM    1. Acute right-sided low back pain with right-sided sciatica  M54.41                      Subjective: Pt presents to PT reporting no pain since last session.  Pt denies pain post PT session.      Objective: See treatment diary below      Assessment: Tolerated treatment well. Noted tightness in B hips, R>L.  Advised and educated pt about continuing to stretch at home to increase flexibility in B hips.  Patient demonstrated fatigue post treatment, exhibited good technique with therapeutic exercises, and would benefit from continued PT to increase flexibility, strength and function.      Plan: Continue per plan of care.      Precautions:   Past Medical History:   Diagnosis Date    Abnormal Pap smear of cervix     Acne     Anxiety     ASCUS with positive high risk HPV cervical     Resolved: 10/23/2017    BRBPR (bright red blood per rectum)     Cancer (HCC)     pre-cancerous, cervical     Cellulitis of right foot     LEN III (cervical intraepithelial neoplasia grade III) with severe dysplasia 2017    Constipation     \"significant\"    Diverticulosis     External hemorrhoids 11/15/2018    Family hx of colon cancer     Paternal uncle    GERD (gastroesophageal reflux disease)     Herpes     Hirsutism     HPV (human papilloma virus) infection     Hx of bleeding disorder     DUB    Psychiatric disorder     anxiety    S/P endometrial ablation 2019    Urinary tract infection      Date    Visit # 1 2 3 4 5 6 7 8    FOTO done    done       Re-eval                  Manuals     P to A L/S                          STM  paraspinalis Right piriformis                          Neuro Re-Ed      PPT 10''x10 5\" x 15   " "5''x15 5''x15 5''x10 5\" x 10 5\" x 10     PPT march NV X10 B  2x10 5\" 2x10 5'' 2x10, 5'' x10, 5'' 5\" x 10 np     PPT rev march NV     2x10 2 x 10 2 x 10     Dead bug NV  10x  15x ea 15x ea        Seated and standing PPT NV            PPT with squats/STS NV     C wand 15x C wand 15x C wand 15x No wand 20x    Quad donkey kicks NV            Quad Fire hydtrant NV   15x ea  15x ea   15x 15x 15x    Quad QL act   X10 B            Bird dog   15x LE only  15x  15x   15x 15x 15x    Palloff press      5\" x10 5\" x10 5\" x10 5\" x10 8# ea                 Ther Ex  8/19 8/21 8/27 8/29 9/4 9/6 9/11 9/13    Bike  5'  6' held held D/C -- -- --    Treadmill      6' 6' 6' 8'    SLR c PPT      15 x 2 ea 15 x 2 ea 15 x 2 ea 2# 15 x 2 ea 2#    S/L SLR 2x10     2 x 10 3 x 10 15 x 2 ea 2# 15 x 2 ea 2#    Prone SLR    2x10 3x10 3 x10 3 x 10 15 x 2 ea 2# 15 x 2 ea 2#    Piriformis str 3x30'' supine 3x30\" supine both ways  supine both ways  3x30'' ea  3x30'' ea  3x30'' ea  3x30'' ea  3x30'' ea  3x30'' ea     Hamstring str 3x30'' supine  3x30\" supine 3x30'' ea 3x30'' ea  3x30'' ea        Hip IR str             Bridges w/ tband nv 5\"x 15  2x10 3\" PTB 2x10 3\" PTB 2x10 3'' GTB 2x10 3'' blueTB 2x10 3'' blueTB 2x10 3'' blueTB 2x10 3'' blueTB    SLR    W/ ppt 3x10  W/ ppt 3x10  W/ ppt 3x10  W/ ppt 3x10  -- -- --   Clamshells NV w/ tband 3\" x 15 b  2x10 3\" PTB 2x10 3\" PTB 2x10 3'' GTB  2x10 3'' blueTB 2x10 3'' blueTB 2x10 3'' blueTB    Hip flexor stretch NV 3x30\" mod reynaldo   Prone 3x30'' Prone 3x30''        Nerve glid 90/90  NV 15 20x No sx  No sx No sx No sx No sx No sx    ITB stretch   Strap 3 x 20\"  3x30\"           Leg press             Cat camel   10x  15x5''  15x5''   5\" x20 5\" x20                 Ther Activity      9/4 9/6 9/11 9/13                              Gait Training                                       Modalities                                                               "

## 2024-09-16 ENCOUNTER — OFFICE VISIT (OUTPATIENT)
Dept: PHYSICAL THERAPY | Facility: CLINIC | Age: 44
End: 2024-09-16
Payer: COMMERCIAL

## 2024-09-16 DIAGNOSIS — M54.41 ACUTE RIGHT-SIDED LOW BACK PAIN WITH RIGHT-SIDED SCIATICA: Primary | ICD-10-CM

## 2024-09-16 PROCEDURE — 97110 THERAPEUTIC EXERCISES: CPT

## 2024-09-16 PROCEDURE — 97140 MANUAL THERAPY 1/> REGIONS: CPT

## 2024-09-16 NOTE — PROGRESS NOTES
"Daily Note     Today's date: 2024  Patient name: Shanice Hair  : 1980  MRN: [id number]  Referring provider: Shantal Smith DO  Dx:   Encounter Diagnosis     ICD-10-CM    1. Acute right-sided low back pain with right-sided sciatica  M54.41                        Subjective: \" I don't have any pain today- the last time I had pain was last week\"       Objective: See treatment diary below      Assessment: Shanice presents to therapy with LBP.  Note increased fatigue in the LE with shaking noted throughout and increased hip flexor compensation. Progressed core and quad activation throughout. Noted decreased glute activation throughout progressed activation to tolerance.  Tolerated session without adverse effects. Recommend continued skilled therapy to improve overall strength and mobility for functional return with decreased compensation and pain.      Plan: Continue per plan of care.      Precautions:   Past Medical History:   Diagnosis Date    Abnormal Pap smear of cervix     Acne     Anxiety     ASCUS with positive high risk HPV cervical     Resolved: 10/23/2017    BRBPR (bright red blood per rectum)     Cancer (HCC)     pre-cancerous, cervical     Cellulitis of right foot     LNE III (cervical intraepithelial neoplasia grade III) with severe dysplasia 2017    Constipation     \"significant\"    Diverticulosis     External hemorrhoids 11/15/2018    Family hx of colon cancer     Paternal uncle    GERD (gastroesophageal reflux disease)     Herpes     Hirsutism     HPV (human papilloma virus) infection     Hx of bleeding disorder     DUB    Psychiatric disorder     anxiety    S/P endometrial ablation 2019    Urinary tract infection      Date    Visit # 10 2 3 4 5 6 7 8    FOTO     done       Re-eval                  Manuals    P to A L/S             Hip mobs           Abd/flex x 15    STM  " "paraspinalis Right piriformis                          Neuro Re-Ed  8/19 8/21 8/27 8/29 9/4 9/6 9/11 9/13 9/16   PPT 10''x10 5\" x 15   5''x15 5''x15 5''x10 5\" x 10 5\" x 10     PPT march NV X10 B  2x10 5\" 2x10 5'' 2x10, 5'' x10, 5'' 5\" x 10 np     PPT rev march NV     2x10 2 x 10 2 x 10     Dead bug NV  10x  15x ea 15x ea     20x ea    Seated and standing PPT NV            PPT with squats/STS NV     C wand 15x C wand 15x C wand 15x No wand 20x    Quad donkey kicks NV            Quad Fire hydtrant NV   15x ea  15x ea   15x 15x 15x    Quad QL act   X10 B         15 ea    Bird dog   15x LE only  15x  15x   15x 15x 15x 20x    Palloff press      5\" x10 5\" x10 5\" x10 5\" x10 8# ea                 Ther Ex  8/19 8/21 8/27 8/29 9/4 9/6 9/11 9/13 9/16   Bike  5'  6' held held D/C -- -- --    Treadmill      6' 6' 6' 8' 6'    SLR c PPT      15 x 2 ea 15 x 2 ea 15 x 2 ea 2# 15 x 2 ea 2# X20 no weight    S/L SLR 2x10     2 x 10 3 x 10 15 x 2 ea 2# 15 x 2 ea 2#    Prone SLR    2x10 3x10 3 x10 3 x 10 15 x 2 ea 2# 15 x 2 ea 2#    Piriformis str 3x30'' supine 3x30\" supine both ways  supine both ways  3x30'' ea  3x30'' ea  3x30'' ea  3x30'' ea  3x30'' ea  3x30'' ea  3x30\" both ways    Hamstring str 3x30'' supine  3x30\" supine 3x30'' ea 3x30'' ea  3x30'' ea        Hip IR str             Bridges w/ tband nv 5\"x 15  2x10 3\" PTB 2x10 3\" PTB 2x10 3'' GTB 2x10 3'' blueTB 2x10 3'' blueTB 2x10 3'' blueTB 2x10 3'' blueTB 5\" 2x10 on ball    SLR    W/ ppt 3x10  W/ ppt 3x10  W/ ppt 3x10  W/ ppt 3x10  -- -- --   Clamshells NV w/ tband 3\" x 15 b  2x10 3\" PTB 2x10 3\" PTB 2x10 3'' GTB  2x10 3'' blueTB 2x10 3'' blueTB 2x10 3'' blueTB    Hip flexor stretch NV 3x30\" mod reynaldo   Prone 3x30'' Prone 3x30''        Nerve glid 90/90  NV 15 20x No sx  No sx No sx No sx No sx No sx X15 B    ITB stretch   Strap 3 x 20\"  3x30\"           Leg press             Cat camel   10x  15x5''  15x5''   5\" x20 5\" x20                 Ther Activity      9/4 9/6 9/11 9/13 9/16 "                             Gait Training                                       Modalities

## 2024-09-18 ENCOUNTER — APPOINTMENT (OUTPATIENT)
Dept: PHYSICAL THERAPY | Facility: CLINIC | Age: 44
End: 2024-09-18
Payer: COMMERCIAL

## 2024-09-18 ENCOUNTER — HOSPITAL ENCOUNTER (OUTPATIENT)
Dept: ULTRASOUND IMAGING | Facility: CLINIC | Age: 44
Discharge: HOME | End: 2024-09-18
Payer: COMMERCIAL

## 2024-09-18 DIAGNOSIS — R92.8 ABNORMAL SCREENING MAMMOGRAM: ICD-10-CM

## 2024-09-18 PROCEDURE — 76642 ULTRASOUND BREAST LIMITED: CPT

## 2024-09-19 ENCOUNTER — OFFICE VISIT (OUTPATIENT)
Dept: PHYSICAL THERAPY | Facility: CLINIC | Age: 44
End: 2024-09-19
Payer: COMMERCIAL

## 2024-09-19 DIAGNOSIS — M54.41 ACUTE RIGHT-SIDED LOW BACK PAIN WITH RIGHT-SIDED SCIATICA: Primary | ICD-10-CM

## 2024-09-19 PROCEDURE — 97110 THERAPEUTIC EXERCISES: CPT

## 2024-09-19 PROCEDURE — 97112 NEUROMUSCULAR REEDUCATION: CPT

## 2024-09-19 NOTE — PROGRESS NOTES
"Daily Note     Today's date: 2024  Patient name: Shanice Hair  : 1980  MRN: [id number]  Referring provider: Shantal Smith DO  Dx:   Encounter Diagnosis     ICD-10-CM    1. Acute right-sided low back pain with right-sided sciatica  M54.41             Start Time: 0750  Stop Time: 0840  Total time in clinic (min): 50 minutes    Subjective: Pt presents to PT with reports of pain and symptoms in the R hip and piriformis.        Objective: See treatment diary below      Assessment: Shanice presents to therapy with LBP.  Pt continues to work towards goals of increased ROM and strength. Pt needed minimal VC/TCing for qped activity. Recommend continued skilled therapy to improve overall strength and mobility for functional return with decreased compensation and pain.  Pt will benefit from further skilled PT to increase strength, flexibility and function.     Plan: Continue per plan of care.      Precautions:   Past Medical History:   Diagnosis Date    Abnormal Pap smear of cervix     Acne     Anxiety     ASCUS with positive high risk HPV cervical     Resolved: 10/23/2017    BRBPR (bright red blood per rectum)     Cancer (HCC)     pre-cancerous, cervical     Cellulitis of right foot     LEN III (cervical intraepithelial neoplasia grade III) with severe dysplasia 2017    Constipation     \"significant\"    Diverticulosis     External hemorrhoids 11/15/2018    Family hx of colon cancer     Paternal uncle    GERD (gastroesophageal reflux disease)     Herpes     Hirsutism     HPV (human papilloma virus) infection     Hx of bleeding disorder     DUB    Psychiatric disorder     anxiety    S/P endometrial ablation 2019    Urinary tract infection      Date  9   Visit # 10 11  4 5 6 7 8    FOTO     done       Re-eval                  Manuals    P to A L/S             Hip mobs           Abd/flex x 15    STM  paraspinalis Right " "piriformis HY PROM                          Neuro Re-Ed 9/19 8/27 8/29 9/4 9/6 9/11 9/13 9/16   PPT    5''x15 5''x15 5''x10 5\" x 10 5\" x 10     PPT march    2x10 5'' 2x10, 5'' x10, 5'' 5\" x 10 np     PPT rev march      2x10 2 x 10 2 x 10     Dead bug 20x ea   15x ea 15x ea     20x ea    Seated and standing PPT             PPT with squats/STS      C wand 15x C wand 15x C wand 15x No wand 20x    Quad donkey kicks             Quad Fire hydtrant    15x ea  15x ea   15x 15x 15x    Quad QL act  15 ea          15 ea    Bird dog 20x   15x  15x   15x 15x 15x 20x    Palloff press      5\" x10 5\" x10 5\" x10 5\" x10 8# ea                 Ther Ex 9/19 8/27 8/29 9/4 9/6 9/11 9/13 9/16   Treadmill 8'     6' 6' 6' 8' 6'    SLR c PPT 20x no wts     15 x 2 ea 15 x 2 ea 15 x 2 ea 2# 15 x 2 ea 2# X20 no weight    S/L SLR      2 x 10 3 x 10 15 x 2 ea 2# 15 x 2 ea 2#    Prone SLR    2x10 3x10 3 x10 3 x 10 15 x 2 ea 2# 15 x 2 ea 2#    Piriformis str 3x30\" both ways    3x30'' ea  3x30'' ea  3x30'' ea  3x30'' ea  3x30'' ea  3x30'' ea  3x30\" both ways    Hamstring str    3x30'' ea 3x30'' ea  3x30'' ea        Hip IR str             Bridges w/ tband 5\" 2x10 on ball    2x10 3\" PTB 2x10 3'' GTB 2x10 3'' blueTB 2x10 3'' blueTB 2x10 3'' blueTB 2x10 3'' blueTB 5\" 2x10 on ball    SLR    W/ ppt 3x10  W/ ppt 3x10  W/ ppt 3x10  W/ ppt 3x10  -- -- --   Clamshells    2x10 3\" PTB 2x10 3'' GTB  2x10 3'' blueTB 2x10 3'' blueTB 2x10 3'' blueTB    Hip flexor stretch    Prone 3x30'' Prone 3x30''        Nerve glid 90/90  X15 B    No sx  No sx No sx No sx No sx No sx X15 B    ITB stretch              Leg press             Cat camel    15x5''  15x5''   5\" x20 5\" x20                 Ther Activity 9/19 9/4 9/6 9/11 9/13 9/16                             Gait Training 9/19                                      Modalities 9/19                                                              "

## 2024-09-20 ENCOUNTER — ANESTHESIA (OUTPATIENT)
Dept: GASTROENTEROLOGY | Facility: HOSPITAL | Age: 44
End: 2024-09-20
Payer: COMMERCIAL

## 2024-09-20 ENCOUNTER — ANESTHESIA EVENT (OUTPATIENT)
Dept: GASTROENTEROLOGY | Facility: HOSPITAL | Age: 44
End: 2024-09-20
Payer: COMMERCIAL

## 2024-09-20 ENCOUNTER — HOSPITAL ENCOUNTER (OUTPATIENT)
Dept: GASTROENTEROLOGY | Facility: HOSPITAL | Age: 44
Setting detail: OUTPATIENT SURGERY
End: 2024-09-20
Attending: SURGERY
Payer: COMMERCIAL

## 2024-09-20 VITALS
HEART RATE: 73 BPM | DIASTOLIC BLOOD PRESSURE: 88 MMHG | HEIGHT: 64 IN | OXYGEN SATURATION: 98 % | SYSTOLIC BLOOD PRESSURE: 125 MMHG | BODY MASS INDEX: 28.85 KG/M2 | RESPIRATION RATE: 16 BRPM | WEIGHT: 169 LBS | TEMPERATURE: 97.9 F

## 2024-09-20 DIAGNOSIS — Z12.11 ENCOUNTER FOR SCREENING COLONOSCOPY: ICD-10-CM

## 2024-09-20 LAB
EXT PREGNANCY TEST URINE: NEGATIVE
EXT. CONTROL: NORMAL

## 2024-09-20 PROCEDURE — 81025 URINE PREGNANCY TEST: CPT | Performed by: ANESTHESIOLOGY

## 2024-09-20 PROCEDURE — 45380 COLONOSCOPY AND BIOPSY: CPT | Performed by: SURGERY

## 2024-09-20 PROCEDURE — 88305 TISSUE EXAM BY PATHOLOGIST: CPT | Performed by: PATHOLOGY

## 2024-09-20 RX ORDER — SODIUM CHLORIDE, SODIUM LACTATE, POTASSIUM CHLORIDE, CALCIUM CHLORIDE 600; 310; 30; 20 MG/100ML; MG/100ML; MG/100ML; MG/100ML
INJECTION, SOLUTION INTRAVENOUS CONTINUOUS PRN
Status: DISCONTINUED | OUTPATIENT
Start: 2024-09-20 | End: 2024-09-20

## 2024-09-20 RX ORDER — PROPOFOL 10 MG/ML
INJECTION, EMULSION INTRAVENOUS AS NEEDED
Status: DISCONTINUED | OUTPATIENT
Start: 2024-09-20 | End: 2024-09-20

## 2024-09-20 RX ORDER — LIDOCAINE HYDROCHLORIDE 10 MG/ML
INJECTION, SOLUTION EPIDURAL; INFILTRATION; INTRACAUDAL; PERINEURAL AS NEEDED
Status: DISCONTINUED | OUTPATIENT
Start: 2024-09-20 | End: 2024-09-20

## 2024-09-20 RX ORDER — PROPOFOL 10 MG/ML
INJECTION, EMULSION INTRAVENOUS CONTINUOUS PRN
Status: DISCONTINUED | OUTPATIENT
Start: 2024-09-20 | End: 2024-09-20

## 2024-09-20 RX ORDER — SODIUM CHLORIDE, SODIUM LACTATE, POTASSIUM CHLORIDE, CALCIUM CHLORIDE 600; 310; 30; 20 MG/100ML; MG/100ML; MG/100ML; MG/100ML
100 INJECTION, SOLUTION INTRAVENOUS CONTINUOUS
Status: CANCELLED | OUTPATIENT
Start: 2024-09-20

## 2024-09-20 RX ORDER — SODIUM CHLORIDE, SODIUM LACTATE, POTASSIUM CHLORIDE, CALCIUM CHLORIDE 600; 310; 30; 20 MG/100ML; MG/100ML; MG/100ML; MG/100ML
125 INJECTION, SOLUTION INTRAVENOUS CONTINUOUS
Status: DISCONTINUED | OUTPATIENT
Start: 2024-09-20 | End: 2024-09-24 | Stop reason: HOSPADM

## 2024-09-20 RX ADMIN — PROPOFOL 20 MG: 10 INJECTION, EMULSION INTRAVENOUS at 14:39

## 2024-09-20 RX ADMIN — PROPOFOL 30 MG: 10 INJECTION, EMULSION INTRAVENOUS at 14:53

## 2024-09-20 RX ADMIN — PROPOFOL 100 MG: 10 INJECTION, EMULSION INTRAVENOUS at 14:33

## 2024-09-20 RX ADMIN — LIDOCAINE HYDROCHLORIDE 50 MG: 10 INJECTION, SOLUTION EPIDURAL; INFILTRATION; INTRACAUDAL; PERINEURAL at 14:33

## 2024-09-20 RX ADMIN — PROPOFOL 30 MG: 10 INJECTION, EMULSION INTRAVENOUS at 14:49

## 2024-09-20 RX ADMIN — PROPOFOL 20 MG: 10 INJECTION, EMULSION INTRAVENOUS at 14:43

## 2024-09-20 RX ADMIN — SODIUM CHLORIDE, SODIUM LACTATE, POTASSIUM CHLORIDE, AND CALCIUM CHLORIDE: .6; .31; .03; .02 INJECTION, SOLUTION INTRAVENOUS at 13:38

## 2024-09-20 NOTE — ANESTHESIA PREPROCEDURE EVALUATION
Procedure:  COLONOSCOPY    Relevant Problems   GI/HEPATIC   (+) Gastroesophageal reflux disease without esophagitis      MUSCULOSKELETAL   (+) Acute right-sided low back pain with right-sided sciatica      Obstetrics/Gynecology   (+) Irregular bleeding      Other   (+) Dyslipidemia   (+) Encounter for screening colonoscopy      Upreg negative     Latest Reference Range & Units 09/20/24 13:30   PREGNANCY TEST URINE Negative  Negative       Physical Exam    Airway    Mallampati score: II  TM Distance: >3 FB  Neck ROM: full     Dental   No notable dental hx     Cardiovascular      Pulmonary      Other Findings  post-pubertal.      Anesthesia Plan  ASA Score- 2     Anesthesia Type- IV sedation with anesthesia with ASA Monitors.         Additional Monitors:     Airway Plan:            Plan Factors-Exercise tolerance (METS): >4 METS.    Chart reviewed.   Existing labs reviewed. Patient summary reviewed.    Patient is not a current smoker.  Patient did not smoke on day of surgery.            Induction- intravenous.    Postoperative Plan-     Perioperative Resuscitation Plan - Level 1 - Full Code.       Informed Consent- Anesthetic plan and risks discussed with patient.  I personally reviewed this patient with the CRNA. Discussed and agreed on the Anesthesia Plan with the CRNA..

## 2024-09-20 NOTE — H&P
"H&P - Surgery-General   Name: Shanice Hair 44 y.o. female I MRN: [id number]  Unit/Bed#:  I Date of Admission: 9/20/2024   Date of Service: 9/20/2024 I Hospital Day: 0     Assessment & Plan   This is a 44 y.o. year old female here for colonoscopy., and she is stable and optimized for her procedure.    History of Present Illness    Shanice Hair is a 44 y.o. year old female who presents for colonoscopy.  Last was 5 years ago which had 1 polyp in the cecum.    REVIEW OF SYSTEMS: Per the HPI, and otherwise unremarkable.    Historical Information   Past Medical History:   Diagnosis Date    Abnormal Pap smear of cervix     Acne     Anxiety     ASCUS with positive high risk HPV cervical     Resolved: 10/23/2017    BRBPR (bright red blood per rectum)     Cancer (HCC)     pre-cancerous, cervical     Cellulitis of right foot 2017    LEN III (cervical intraepithelial neoplasia grade III) with severe dysplasia 09/06/2017    Colon polyp     Constipation     \"significant\"    Diverticulosis     External hemorrhoids 11/15/2018    Family hx of colon cancer     Paternal uncle    GERD (gastroesophageal reflux disease)     Herpes     Hirsutism     HPV (human papilloma virus) infection     Hx of bleeding disorder     DUB    Psychiatric disorder     anxiety    S/P endometrial ablation 04/23/2019    Urinary tract infection      Past Surgical History:   Procedure Laterality Date    COLONOSCOPY      COLPOSCOPY      OVARIAN CYST REMOVAL      [place] COLONOSCOPY FLX DX W/COLLJ SPEC WHEN PFRMD N/A 01/22/2019    Procedure: COLONOSCOPY with polypectomy;  Surgeon: Jane Warren MD;  Location: [facility];  Service: General    [place] HYSTEROSCOPY DIAGNOSTIC SEPARATE PROCEDURE N/A 04/23/2019    Procedure: HYSTEROSCOPY;  Surgeon: Hermes Solano MD;  Location: [facility];  Service: Gynecology    [place] HYSTEROSCOPY ENDOMETRIAL ABLATION N/A 04/23/2019    Procedure: ABLATION ENDOMETRIAL;  Surgeon: Hermes Solano MD;  Location: [facility];  Service: Gynecology    " "TUBAL LIGATION      WISDOM TOOTH EXTRACTION       Social History     Tobacco Use    Smoking status: Never    Smokeless tobacco: Never   Vaping Use    Vaping status: Never Used   Substance and Sexual Activity    Alcohol use: Yes     Comment: once every few months    Drug use: No    Sexual activity: Not Currently     Partners: Male     Birth control/protection: OCP     E-Cigarette/Vaping    E-Cigarette Use Never User      E-Cigarette/Vaping Substances    Nicotine No     THC No     CBD No     Flavoring No     Other No     Unknown No      Family history non-contributory    Meds/Allergies     Current Outpatient Medications:     famotidine (PEPCID) 40 MG tablet    Multiple Vitamins-Minerals (MULTIVITAMIN GUMMIES ADULT PO)    norethindrone (MICRONOR) 0.35 MG tablet    valACYclovir (VALTREX) 1,000 mg tablet    docusate sodium (COLACE) 100 mg capsule    hydrocortisone (WESTCORT) 0.2 % cream    naproxen (Naprosyn) 500 mg tablet    polymyxin b-trimethoprim (POLYTRIM) ophthalmic solution    Current Facility-Administered Medications:     lactated ringers infusion, 125 mL/hr, Intravenous, Continuous  Allergies   Allergen Reactions    Cephalosporins Hives       Objective   /92   Pulse 72   Temp (!) 97.4 °F (36.3 °C) (Temporal)   Resp 18   Ht 5' 4\" (1.626 m)   Wt 76.7 kg (169 lb)   LMP 06/15/2024 (Within Months)   SpO2 98%   BMI 29.01 kg/m²     Physical Exam  Vitals and nursing note reviewed.   Constitutional:       General: She is not in acute distress.     Appearance: She is well-developed. She is not diaphoretic.   HENT:      Head: Normocephalic and atraumatic.   Eyes:      Pupils: Pupils are equal, round, and reactive to light.   Cardiovascular:      Rate and Rhythm: Normal rate and regular rhythm.   Pulmonary:      Effort: Pulmonary effort is normal. No respiratory distress.   Abdominal:      General: Bowel sounds are normal.      Palpations: Abdomen is soft.   Musculoskeletal:         General: Normal range of " motion.      Cervical back: Normal range of motion and neck supple.   Skin:     General: Skin is warm and dry.   Neurological:      Mental Status: She is alert and oriented to person, place, and time.   Psychiatric:         Behavior: Behavior normal.       Gen: NAD  Head: NCAT  CV: RRR  CHEST: Clear  ABD: soft, NT/ND  EXT: no edema

## 2024-09-22 DIAGNOSIS — K21.9 GASTROESOPHAGEAL REFLUX DISEASE WITHOUT ESOPHAGITIS: ICD-10-CM

## 2024-09-23 RX ORDER — FAMOTIDINE 40 MG/1
TABLET, FILM COATED ORAL
Qty: 90 TABLET | Refills: 1 | Status: SHIPPED | OUTPATIENT
Start: 2024-09-23

## 2024-09-25 PROCEDURE — 88305 TISSUE EXAM BY PATHOLOGIST: CPT | Performed by: PATHOLOGY

## 2024-10-04 DIAGNOSIS — M54.41 ACUTE RIGHT-SIDED LOW BACK PAIN WITH RIGHT-SIDED SCIATICA: ICD-10-CM

## 2024-10-04 RX ORDER — NAPROXEN 500 MG/1
TABLET ORAL
Qty: 60 TABLET | Refills: 1 | Status: SHIPPED | OUTPATIENT
Start: 2024-10-04

## 2024-11-30 DIAGNOSIS — N92.0 MENORRHAGIA WITH REGULAR CYCLE: ICD-10-CM

## 2024-12-02 RX ORDER — NORETHINDRONE 0.35 MG/1
1 TABLET ORAL DAILY
Qty: 84 TABLET | Refills: 0 | Status: SHIPPED | OUTPATIENT
Start: 2024-12-02

## 2024-12-06 DIAGNOSIS — A60.04 HERPES SIMPLEX VULVOVAGINITIS: ICD-10-CM

## 2024-12-06 RX ORDER — VALACYCLOVIR HYDROCHLORIDE 1 G/1
1000 TABLET, FILM COATED ORAL DAILY
Qty: 90 TABLET | Refills: 1 | Status: SHIPPED | OUTPATIENT
Start: 2024-12-06 | End: 2025-06-04

## 2024-12-12 DIAGNOSIS — M54.41 ACUTE RIGHT-SIDED LOW BACK PAIN WITH RIGHT-SIDED SCIATICA: ICD-10-CM

## 2024-12-12 RX ORDER — NAPROXEN 500 MG/1
TABLET ORAL
Qty: 60 TABLET | Refills: 1 | Status: SHIPPED | OUTPATIENT
Start: 2024-12-12

## 2024-12-13 ENCOUNTER — OFFICE VISIT (OUTPATIENT)
Dept: FAMILY MEDICINE CLINIC | Facility: CLINIC | Age: 44
End: 2024-12-13
Payer: COMMERCIAL

## 2024-12-13 VITALS
HEART RATE: 82 BPM | TEMPERATURE: 98.2 F | BODY MASS INDEX: 29.67 KG/M2 | HEIGHT: 64 IN | OXYGEN SATURATION: 99 % | DIASTOLIC BLOOD PRESSURE: 78 MMHG | SYSTOLIC BLOOD PRESSURE: 128 MMHG | WEIGHT: 173.8 LBS

## 2024-12-13 DIAGNOSIS — R92.1 BREAST CALCIFICATIONS: ICD-10-CM

## 2024-12-13 DIAGNOSIS — E78.5 DYSLIPIDEMIA: ICD-10-CM

## 2024-12-13 DIAGNOSIS — K63.5 POLYP OF COLON, UNSPECIFIED PART OF COLON, UNSPECIFIED TYPE: ICD-10-CM

## 2024-12-13 DIAGNOSIS — R92.333 HETEROGENEOUSLY DENSE TISSUE OF BOTH BREASTS ON MAMMOGRAPHY: ICD-10-CM

## 2024-12-13 DIAGNOSIS — Z00.00 ANNUAL PHYSICAL EXAM: Primary | ICD-10-CM

## 2024-12-13 DIAGNOSIS — K21.9 GASTROESOPHAGEAL REFLUX DISEASE WITHOUT ESOPHAGITIS: ICD-10-CM

## 2024-12-13 PROBLEM — N92.6 IRREGULAR BLEEDING: Status: RESOLVED | Noted: 2017-10-02 | Resolved: 2024-12-13

## 2024-12-13 PROBLEM — I80.02 SUPERFICIAL PHLEBITIS OF LEFT LEG: Status: RESOLVED | Noted: 2022-09-06 | Resolved: 2024-12-13

## 2024-12-13 PROBLEM — B35.1 ONYCHOMYCOSIS OF TOENAIL: Status: RESOLVED | Noted: 2019-04-22 | Resolved: 2024-12-13

## 2024-12-13 PROBLEM — Z12.11 ENCOUNTER FOR SCREENING COLONOSCOPY: Status: RESOLVED | Noted: 2024-08-13 | Resolved: 2024-12-13

## 2024-12-13 PROCEDURE — 99396 PREV VISIT EST AGE 40-64: CPT | Performed by: FAMILY MEDICINE

## 2024-12-13 PROCEDURE — 99214 OFFICE O/P EST MOD 30 MIN: CPT | Performed by: FAMILY MEDICINE

## 2024-12-13 NOTE — ASSESSMENT & PLAN NOTE
Patient refuses flu shot Patient will see GYN and have mammogram as scheduled Patient will continue with diet and exerxcise Patient is up to date with eye exam and dentist

## 2024-12-13 NOTE — ASSESSMENT & PLAN NOTE
Reviewed labs from last year LDL is elevated with goal at < 100 Hers is 141 Patient will watch diet and check labs   Orders:    Comprehensive metabolic panel; Future    Lipid panel; Future

## 2024-12-13 NOTE — PROGRESS NOTES
Adult Annual Physical  Name: Shanice Hair      : 1980      MRN: [id number]  Encounter Provider: Shantal Smith DO  Encounter Date: 2024   Encounter department: [facility]    Assessment & Plan  Annual physical exam  Patient refuses flu shot Patient will see GYN and have mammogram as scheduled Patient will continue with diet and exerxcise Patient is up to date with eye exam and dentist        Breast calcifications  Continue with mammograms as scheduled       Heterogeneously dense tissue of both breasts on mammography  Continue mammograms as scheduled       Polyp of colon, unspecified part of colon, unspecified type  Repeat colonoscopy       Gastroesophageal reflux disease without esophagitis  Reflux is stable on pepcid continue that and followup in 1 yr   Orders:    CBC and differential; Future    Dyslipidemia  Reviewed labs from last year LDL is elevated with goal at < 100 Hers is 141 Patient will watch diet and check labs   Orders:    Comprehensive metabolic panel; Future    Lipid panel; Future    Immunizations and preventive care screenings were discussed with patient today. Appropriate education was printed on patient's after visit summary.    Counseling:  Alcohol/drug use: discussed moderation in alcohol intake, the recommendations for healthy alcohol use, and avoidance of illicit drug use.  Dental Health: discussed importance of regular tooth brushing, flossing, and dental visits.  Injury prevention: discussed safety/seat belts, safety helmets, smoke detectors, carbon monoxide detectors, and smoking near bedding or upholstery.  Sexual health: discussed sexually transmitted diseases, partner selection, use of condoms, avoidance of unintended pregnancy, and contraceptive alternatives.  Exercise: the importance of regular exercise/physical activity was discussed. Recommend exercise 3-5 times per week for at least 30 minutes.          History of Present Illness     Adult  Annual Physical:  Patient presents for annual physical. Patient is here for annual PE She is doing well with her GERD and has been taking the pepcid She is up to date with colonoscopy She has history of colon polpys She has had LEN and has followup with Gyn for that patient has breast calcifications and needs mammogram and has it scheduled Patient has no new concerns Her labs have shown issues with elevated lipids Patient is watching diet LDL was 140 with low HDL of 35 .     Diet and Physical Activity:  - Diet/Nutrition: well balanced diet, consuming 3-5 servings of fruits/vegetables daily and limited junk food.  - Exercise: no formal exercise.    Depression Screening:  - PHQ-2 Score: 0    General Health:  - Sleep: sleeps well and 7-8 hours of sleep on average.  - Hearing: normal hearing right ear and normal hearing left ear.  - Vision: goes for regular eye exams, wears glasses and most recent eye exam < 1 year ago.  - Dental: regular dental visits and brushes teeth twice daily.    /GYN Health:  - Follows with GYN: yes.   - Menopause: perimenopausal.   - Last menstrual cycle: 8/19/2024.   - History of STDs: no  - Contraception: oral contraceptives. on pill      Advanced Care Planning:  - Has an advanced directive?: no    - Has a durable medical POA?: no    - ACP document given to patient?: yes      Review of Systems   Constitutional:  Negative for fatigue, fever and unexpected weight change.   HENT:  Negative for congestion, sinus pain and trouble swallowing.    Eyes:  Negative for discharge and visual disturbance.   Respiratory:  Negative for cough, chest tightness, shortness of breath and wheezing.    Cardiovascular:  Negative for chest pain, palpitations and leg swelling.   Gastrointestinal:  Negative for abdominal pain, blood in stool, constipation, diarrhea, nausea and vomiting.   Genitourinary:  Negative for difficulty urinating, dysuria, frequency and hematuria.   Musculoskeletal:  Negative for arthralgias,  "gait problem and joint swelling.   Skin:  Negative for rash and wound.   Allergic/Immunologic: Negative for environmental allergies and food allergies.   Neurological:  Negative for dizziness, syncope, weakness, numbness and headaches.   Hematological:  Negative for adenopathy. Does not bruise/bleed easily.   Psychiatric/Behavioral:  Negative for confusion, decreased concentration and sleep disturbance. The patient is not nervous/anxious.          Objective   /78   Pulse 82   Temp 98.2 °F (36.8 °C)   Ht 5' 4\" (1.626 m)   Wt 78.8 kg (173 lb 12.8 oz)   SpO2 99%   BMI 29.83 kg/m²     Physical Exam  Vitals and nursing note reviewed.   Constitutional:       Appearance: Normal appearance. She is well-developed.   HENT:      Head: Normocephalic and atraumatic.      Right Ear: Hearing, tympanic membrane and external ear normal.      Left Ear: Hearing, tympanic membrane and external ear normal.   Eyes:      Extraocular Movements: Extraocular movements intact.      Conjunctiva/sclera: Conjunctivae normal.      Pupils: Pupils are equal, round, and reactive to light.   Neck:      Thyroid: No thyromegaly.   Cardiovascular:      Rate and Rhythm: Normal rate and regular rhythm.      Heart sounds: Normal heart sounds.   Pulmonary:      Effort: Pulmonary effort is normal.      Breath sounds: Normal breath sounds. No wheezing or rales.   Abdominal:      General: Bowel sounds are normal. There is no distension.      Palpations: Abdomen is soft.      Tenderness: There is no abdominal tenderness.   Musculoskeletal:         General: No tenderness.      Cervical back: Neck supple.   Lymphadenopathy:      Cervical: No cervical adenopathy.   Skin:     General: Skin is warm and dry.      Findings: No rash.   Neurological:      General: No focal deficit present.      Mental Status: She is alert and oriented to person, place, and time.      Cranial Nerves: No cranial nerve deficit.      Coordination: Coordination normal. "   Psychiatric:         Mood and Affect: Mood normal.         Behavior: Behavior normal.         Thought Content: Thought content normal.         Judgment: Judgment normal.

## 2024-12-13 NOTE — ASSESSMENT & PLAN NOTE
Reflux is stable on pepcid continue that and followup in 1 yr   Orders:    CBC and differential; Future

## 2024-12-24 ENCOUNTER — OFFICE VISIT (OUTPATIENT)
Dept: URGENT CARE | Facility: MEDICAL CENTER | Age: 44
End: 2024-12-24
Payer: COMMERCIAL

## 2024-12-24 VITALS
SYSTOLIC BLOOD PRESSURE: 134 MMHG | TEMPERATURE: 98.3 F | HEART RATE: 86 BPM | OXYGEN SATURATION: 98 % | DIASTOLIC BLOOD PRESSURE: 88 MMHG | WEIGHT: 172 LBS | RESPIRATION RATE: 18 BRPM | HEIGHT: 63 IN | BODY MASS INDEX: 30.48 KG/M2

## 2024-12-24 DIAGNOSIS — J40 BRONCHITIS: ICD-10-CM

## 2024-12-24 DIAGNOSIS — J01.00 ACUTE MAXILLARY SINUSITIS, RECURRENCE NOT SPECIFIED: Primary | ICD-10-CM

## 2024-12-24 PROCEDURE — S9083 URGENT CARE CENTER GLOBAL: HCPCS | Performed by: PHYSICIAN ASSISTANT

## 2024-12-24 PROCEDURE — G0382 LEV 3 HOSP TYPE B ED VISIT: HCPCS | Performed by: PHYSICIAN ASSISTANT

## 2024-12-24 RX ORDER — AZITHROMYCIN 250 MG/1
TABLET, FILM COATED ORAL
Qty: 6 TABLET | Refills: 0 | Status: SHIPPED | OUTPATIENT
Start: 2024-12-24 | End: 2024-12-28

## 2024-12-24 RX ORDER — FLUTICASONE PROPIONATE 50 MCG
1 SPRAY, SUSPENSION (ML) NASAL DAILY
Qty: 9.9 ML | Refills: 0 | Status: SHIPPED | OUTPATIENT
Start: 2024-12-24

## 2024-12-24 RX ORDER — BENZONATATE 100 MG/1
100 CAPSULE ORAL 3 TIMES DAILY PRN
Qty: 20 CAPSULE | Refills: 0 | Status: SHIPPED | OUTPATIENT
Start: 2024-12-24

## 2024-12-24 NOTE — PROGRESS NOTES
"[facility]        NAME: Shanice Hair is a 44 y.o. female  : 1980    MRN: [id number]  DATE: 2024  TIME: 12:34 PM    /88   Pulse 86   Temp 98.3 °F (36.8 °C) (Tympanic)   Resp 18   Ht 5' 3\" (1.6 m)   Wt 78 kg (172 lb)   SpO2 98%   BMI 30.47 kg/m²     Assessment and Plan   Acute maxillary sinusitis, recurrence not specified [J01.00]  1. Acute maxillary sinusitis, recurrence not specified  azithromycin (ZITHROMAX) 250 mg tablet    benzonatate (TESSALON PERLES) 100 mg capsule    fluticasone (FLONASE) 50 mcg/act nasal spray      2. Bronchitis  azithromycin (ZITHROMAX) 250 mg tablet    benzonatate (TESSALON PERLES) 100 mg capsule    fluticasone (FLONASE) 50 mcg/act nasal spray            Patient Instructions       Follow up with PCP in 3-5 days.  Proceed to  ER if symptoms worsen.    Chief Complaint     Chief Complaint   Patient presents with    Cold Like Symptoms     Eyes started being dry, red and irritated on Thursday. Friday had a sore throat. Yesterday started coughing and brining up phlegm. Nasal started on Saturday. Having a hard breathing through nose. Chest pain with coughing. Headaches and sinus pressure. Ears feel like they are under water.           History of Present Illness       Pt with sore throat nasal mild productive cough for 5 days         Review of Systems   Review of Systems   Constitutional: Negative.    HENT:  Positive for congestion, ear pain, sinus pressure, sinus pain and sore throat.    Eyes: Negative.    Respiratory:  Positive for cough.    Cardiovascular: Negative.    Gastrointestinal: Negative.    Endocrine: Negative.    Genitourinary: Negative.    Musculoskeletal: Negative.    Skin: Negative.    Allergic/Immunologic: Negative.    Neurological: Negative.    Hematological: Negative.    Psychiatric/Behavioral: Negative.     All other systems reviewed and are negative.        Current Medications       Current Outpatient Medications:     azithromycin " (ZITHROMAX) 250 mg tablet, Take 2 tablets today then 1 tablet daily x 4 days, Disp: 6 tablet, Rfl: 0    benzonatate (TESSALON PERLES) 100 mg capsule, Take 1 capsule (100 mg total) by mouth 3 (three) times a day as needed for cough, Disp: 20 capsule, Rfl: 0    docusate sodium (COLACE) 100 mg capsule, TAKE 1 CAPSULE BY MOUTH TWICE A DAY, Disp: 30 capsule, Rfl: 0    famotidine (PEPCID) 40 MG tablet, TAKE 1 TABLET BY MOUTH EVERY DAY, Disp: 90 tablet, Rfl: 1    fluticasone (FLONASE) 50 mcg/act nasal spray, 1 spray into each nostril daily, Disp: 9.9 mL, Rfl: 0    hydrocortisone (WESTCORT) 0.2 % cream, Apply topically 2 (two) times a day, Disp: 45 g, Rfl: 0    Multiple Vitamins-Minerals (MULTIVITAMIN GUMMIES ADULT PO), Take 2 tablets by mouth daily, Disp: , Rfl:     naproxen (NAPROSYN) 500 mg tablet, 1 TABLET TWICE DAILY FOR 10 DAYS THEN AS NEEDED, Disp: 60 tablet, Rfl: 1    norethindrone (Jencycla) 0.35 MG tablet, TAKE 1 TABLET BY MOUTH EVERY DAY, Disp: 84 tablet, Rfl: 0    valACYclovir (VALTREX) 1,000 mg tablet, Take 1 tablet (1,000 mg total) by mouth daily, Disp: 90 tablet, Rfl: 1    polymyxin b-trimethoprim (POLYTRIM) ophthalmic solution, Administer 1 drop into the left eye every 4 (four) hours (Patient not taking: Reported on 8/9/2024), Disp: 10 mL, Rfl: 0    Current Allergies     Allergies as of 12/24/2024 - Reviewed 12/24/2024   Allergen Reaction Noted    Cephalosporins Hives 12/19/2017            The following portions of the patient's history were reviewed and updated as appropriate: allergies, current medications, past family history, past medical history, past social history, past surgical history and problem list.     Past Medical History:   Diagnosis Date    Abnormal Pap smear of cervix     Acne     Anxiety     ASCUS with positive high risk HPV cervical     Resolved: 10/23/2017    BRBPR (bright red blood per rectum)     Cancer (HCC)     pre-cancerous, cervical     Cellulitis of right foot 2017    LEN III  "(cervical intraepithelial neoplasia grade III) with severe dysplasia 09/06/2017    Colon polyp     Constipation     \"significant\"    Diverticulosis     External hemorrhoids 11/15/2018    Family hx of colon cancer     Paternal uncle    GERD (gastroesophageal reflux disease)     Herpes     Hirsutism     HPV (human papilloma virus) infection     Hx of bleeding disorder     DUB    Psychiatric disorder     anxiety    S/P endometrial ablation 04/23/2019    Urinary tract infection        Past Surgical History:   Procedure Laterality Date    COLONOSCOPY      COLPOSCOPY      OVARIAN CYST REMOVAL      [place] COLONOSCOPY FLX DX W/COLLJ SPEC WHEN PFRMD N/A 01/22/2019    Procedure: COLONOSCOPY with polypectomy;  Surgeon: Jane Warren MD;  Location: [facility];  Service: General    [place] HYSTEROSCOPY DIAGNOSTIC SEPARATE PROCEDURE N/A 04/23/2019    Procedure: HYSTEROSCOPY;  Surgeon: Hermes Solano MD;  [facility];  Service: Gynecology    [place] HYSTEROSCOPY ENDOMETRIAL ABLATION N/A 04/23/2019    Procedure: ABLATION ENDOMETRIAL;  Surgeon: Hermes Solano MD;  Location[facility];  Service: Gynecology    TUBAL LIGATION      WISDOM TOOTH EXTRACTION         Family History   Problem Relation Age of Onset    Hyperlipidemia Mother     Hypertension Mother     Diabetes Mother     Coronary artery disease Father     No Known Problems Daughter     No Known Problems Daughter     No Known Problems Daughter     Breast cancer Paternal Grandmother 80    Postmenopausal breast cancer Paternal Grandmother     Alzheimer's disease Paternal Grandfather     No Known Problems Paternal Aunt     No Known Problems Paternal Aunt     Colon cancer Paternal Uncle     Brain cancer Paternal Uncle     No Known Problems Half-Sister     Autoimmune disease Half-Sister     Spinal muscular atrophy Half-Sister     No Known Problems Half-Sister     No Known Problems Half-Sister     No Known Problems Half-Brother     Skin cancer Half-Brother          Medications have been " "verified.        Objective   /88   Pulse 86   Temp 98.3 °F (36.8 °C) (Tympanic)   Resp 18   Ht 5' 3\" (1.6 m)   Wt 78 kg (172 lb)   SpO2 98%   BMI 30.47 kg/m²        Physical Exam     Physical Exam  Vitals and nursing note reviewed.   Constitutional:       Appearance: Normal appearance. She is normal weight.      Comments: Home covid test negative    HENT:      Head: Normocephalic and atraumatic.      Right Ear: Tympanic membrane, ear canal and external ear normal.      Left Ear: Tympanic membrane, ear canal and external ear normal.      Nose: Congestion and rhinorrhea present.      Comments: Boggy mucosa yellow d/c      Mouth/Throat:      Mouth: Mucous membranes are moist.      Pharynx: Oropharynx is clear. Posterior oropharyngeal erythema present.   Eyes:      Extraocular Movements: Extraocular movements intact.      Conjunctiva/sclera: Conjunctivae normal.      Pupils: Pupils are equal, round, and reactive to light.   Cardiovascular:      Rate and Rhythm: Normal rate and regular rhythm.      Pulses: Normal pulses.      Heart sounds: Normal heart sounds.   Pulmonary:      Effort: Pulmonary effort is normal.      Comments: Minor coarse sounds cleared with cough   Abdominal:      Palpations: Abdomen is soft.   Musculoskeletal:         General: Normal range of motion.      Cervical back: Normal range of motion and neck supple.   Skin:     General: Skin is warm.   Neurological:      Mental Status: She is alert and oriented to person, place, and time.   Psychiatric:         Behavior: Behavior normal.                     "

## 2024-12-24 NOTE — LETTER
December 24, 2024     Patient: Shanice Hair   YOB: 1980   Date of Visit: 12/24/2024       To Whom It May Concern:    It is my medical opinion that Shanice Hair may return to work on 12/26/2024 .    If you have any questions or concerns, please don't hesitate to call.         Sincerely,        Tucker Rodriguez Jr, KEKE    CC: No Recipients

## 2025-02-07 DIAGNOSIS — M54.41 ACUTE RIGHT-SIDED LOW BACK PAIN WITH RIGHT-SIDED SCIATICA: ICD-10-CM

## 2025-02-07 RX ORDER — NAPROXEN 500 MG/1
TABLET ORAL
Qty: 60 TABLET | Refills: 0 | Status: SHIPPED | OUTPATIENT
Start: 2025-02-07

## 2025-02-27 ENCOUNTER — TELEPHONE (OUTPATIENT)
Age: 45
End: 2025-02-27

## 2025-02-28 ENCOUNTER — HOSPITAL ENCOUNTER (OUTPATIENT)
Dept: ULTRASOUND IMAGING | Facility: CLINIC | Age: 45
Discharge: HOME | End: 2025-02-28
Payer: COMMERCIAL

## 2025-02-28 ENCOUNTER — APPOINTMENT (OUTPATIENT)
Dept: LAB | Facility: HOSPITAL | Age: 45
End: 2025-02-28
Payer: COMMERCIAL

## 2025-02-28 ENCOUNTER — RESULTS FOLLOW-UP (OUTPATIENT)
Dept: FAMILY MEDICINE CLINIC | Facility: CLINIC | Age: 45
End: 2025-02-28

## 2025-02-28 ENCOUNTER — HOSPITAL ENCOUNTER (OUTPATIENT)
Dept: MAMMOGRAPHY | Facility: CLINIC | Age: 45
Discharge: HOME | End: 2025-02-28
Payer: COMMERCIAL

## 2025-02-28 VITALS — WEIGHT: 172 LBS | BODY MASS INDEX: 30.48 KG/M2 | HEIGHT: 63 IN

## 2025-02-28 DIAGNOSIS — R92.1 BREAST CALCIFICATION, LEFT: ICD-10-CM

## 2025-02-28 DIAGNOSIS — K21.9 GASTROESOPHAGEAL REFLUX DISEASE WITHOUT ESOPHAGITIS: ICD-10-CM

## 2025-02-28 DIAGNOSIS — E78.5 DYSLIPIDEMIA: ICD-10-CM

## 2025-02-28 LAB
ALBUMIN SERPL BCG-MCNC: 4.3 G/DL (ref 3.5–5)
ALP SERPL-CCNC: 42 U/L (ref 34–104)
ALT SERPL W P-5'-P-CCNC: 16 U/L (ref 7–52)
ANION GAP SERPL CALCULATED.3IONS-SCNC: 2 MMOL/L (ref 4–13)
AST SERPL W P-5'-P-CCNC: 19 U/L (ref 13–39)
BASOPHILS # BLD AUTO: 0.05 THOUSANDS/ÂΜL (ref 0–0.1)
BASOPHILS NFR BLD AUTO: 1 % (ref 0–1)
BILIRUB SERPL-MCNC: 0.84 MG/DL (ref 0.2–1)
BUN SERPL-MCNC: 16 MG/DL (ref 5–25)
CALCIUM SERPL-MCNC: 9.7 MG/DL (ref 8.4–10.2)
CHLORIDE SERPL-SCNC: 109 MMOL/L (ref 96–108)
CHOLEST SERPL-MCNC: 180 MG/DL (ref ?–200)
CO2 SERPL-SCNC: 27 MMOL/L (ref 21–32)
CREAT SERPL-MCNC: 0.76 MG/DL (ref 0.6–1.3)
EOSINOPHIL # BLD AUTO: 0.18 THOUSAND/ÂΜL (ref 0–0.61)
EOSINOPHIL NFR BLD AUTO: 4 % (ref 0–6)
ERYTHROCYTE [DISTWIDTH] IN BLOOD BY AUTOMATED COUNT: 11.9 % (ref 11.6–15.1)
GFR SERPL CREATININE-BSD FRML MDRD: 97 ML/MIN/1.73SQ M
GLUCOSE P FAST SERPL-MCNC: 81 MG/DL (ref 65–99)
HCT VFR BLD AUTO: 43.7 % (ref 34.8–46.1)
HDLC SERPL-MCNC: 36 MG/DL
HGB BLD-MCNC: 14.7 G/DL (ref 11.5–15.4)
IMM GRANULOCYTES # BLD AUTO: 0.01 THOUSAND/UL (ref 0–0.2)
IMM GRANULOCYTES NFR BLD AUTO: 0 % (ref 0–2)
LDLC SERPL CALC-MCNC: 119 MG/DL (ref 0–100)
LYMPHOCYTES # BLD AUTO: 1.81 THOUSANDS/ÂΜL (ref 0.6–4.47)
LYMPHOCYTES NFR BLD AUTO: 36 % (ref 14–44)
MCH RBC QN AUTO: 33.5 PG (ref 26.8–34.3)
MCHC RBC AUTO-ENTMCNC: 33.6 G/DL (ref 31.4–37.4)
MCV RBC AUTO: 100 FL (ref 82–98)
MONOCYTES # BLD AUTO: 0.56 THOUSAND/ÂΜL (ref 0.17–1.22)
MONOCYTES NFR BLD AUTO: 11 % (ref 4–12)
NEUTROPHILS # BLD AUTO: 2.44 THOUSANDS/ÂΜL (ref 1.85–7.62)
NEUTS SEG NFR BLD AUTO: 48 % (ref 43–75)
NONHDLC SERPL-MCNC: 144 MG/DL
NRBC BLD AUTO-RTO: 0 /100 WBCS
PLATELET # BLD AUTO: 260 THOUSANDS/UL (ref 149–390)
PMV BLD AUTO: 10.6 FL (ref 8.9–12.7)
POTASSIUM SERPL-SCNC: 3.7 MMOL/L (ref 3.5–5.3)
PROT SERPL-MCNC: 6.9 G/DL (ref 6.4–8.4)
RBC # BLD AUTO: 4.39 MILLION/UL (ref 3.81–5.12)
SODIUM SERPL-SCNC: 138 MMOL/L (ref 135–147)
TRIGL SERPL-MCNC: 127 MG/DL (ref ?–150)
WBC # BLD AUTO: 5.05 THOUSAND/UL (ref 4.31–10.16)

## 2025-02-28 PROCEDURE — 36415 COLL VENOUS BLD VENIPUNCTURE: CPT

## 2025-02-28 PROCEDURE — 76642 ULTRASOUND BREAST LIMITED: CPT

## 2025-02-28 PROCEDURE — 77066 DX MAMMO INCL CAD BI: CPT

## 2025-02-28 PROCEDURE — 85025 COMPLETE CBC W/AUTO DIFF WBC: CPT

## 2025-02-28 PROCEDURE — 80053 COMPREHEN METABOLIC PANEL: CPT

## 2025-02-28 PROCEDURE — 80061 LIPID PANEL: CPT

## 2025-02-28 PROCEDURE — G0279 TOMOSYNTHESIS, MAMMO: HCPCS

## 2025-02-28 NOTE — TELEPHONE ENCOUNTER
----- Message from Shantal Smith DO sent at 2/28/2025  1:23 PM EST -----  Call patient cholesterol is improved continue current diet and exercise

## 2025-03-01 DIAGNOSIS — N92.0 MENORRHAGIA WITH REGULAR CYCLE: ICD-10-CM

## 2025-03-03 RX ORDER — NORETHINDRONE 0.35 MG/1
1 TABLET ORAL DAILY
Qty: 28 TABLET | Refills: 0 | Status: SHIPPED | OUTPATIENT
Start: 2025-03-03

## 2025-03-09 DIAGNOSIS — M54.41 ACUTE RIGHT-SIDED LOW BACK PAIN WITH RIGHT-SIDED SCIATICA: ICD-10-CM

## 2025-03-10 RX ORDER — NAPROXEN 500 MG/1
TABLET ORAL
Qty: 60 TABLET | Refills: 0 | Status: SHIPPED | OUTPATIENT
Start: 2025-03-10

## 2025-03-27 ENCOUNTER — ANNUAL EXAM (OUTPATIENT)
Dept: GYNECOLOGY | Facility: CLINIC | Age: 45
End: 2025-03-27
Payer: COMMERCIAL

## 2025-03-27 VITALS
DIASTOLIC BLOOD PRESSURE: 66 MMHG | WEIGHT: 174.2 LBS | BODY MASS INDEX: 30.87 KG/M2 | HEIGHT: 63 IN | SYSTOLIC BLOOD PRESSURE: 108 MMHG

## 2025-03-27 DIAGNOSIS — Z01.419 WOMEN'S ANNUAL ROUTINE GYNECOLOGICAL EXAMINATION: Primary | ICD-10-CM

## 2025-03-27 DIAGNOSIS — N92.0 MENORRHAGIA WITH REGULAR CYCLE: ICD-10-CM

## 2025-03-27 DIAGNOSIS — Z87.410 HISTORY OF CERVICAL DYSPLASIA: ICD-10-CM

## 2025-03-27 DIAGNOSIS — A60.04 HERPES SIMPLEX VULVOVAGINITIS: ICD-10-CM

## 2025-03-27 PROCEDURE — S0612 ANNUAL GYNECOLOGICAL EXAMINA: HCPCS | Performed by: OBSTETRICS & GYNECOLOGY

## 2025-03-27 PROCEDURE — G0145 SCR C/V CYTO,THINLAYER,RESCR: HCPCS | Performed by: OBSTETRICS & GYNECOLOGY

## 2025-03-27 RX ORDER — VALACYCLOVIR HYDROCHLORIDE 1 G/1
1000 TABLET, FILM COATED ORAL DAILY
Qty: 90 TABLET | Refills: 3 | Status: SHIPPED | OUTPATIENT
Start: 2025-03-27 | End: 2025-09-23

## 2025-03-27 RX ORDER — NORETHINDRONE 0.35 MG/1
1 TABLET ORAL DAILY
Qty: 84 TABLET | Refills: 1 | Status: SHIPPED | OUTPATIENT
Start: 2025-03-27

## 2025-03-27 NOTE — PROGRESS NOTES
Assessment & Plan   Diagnoses and all orders for this visit:    Women's annual routine gynecological examination    History of cervical dysplasia    Herpes simplex vulvovaginitis  -     valACYclovir (VALTREX) 1,000 mg tablet; Take 1 tablet (1,000 mg total) by mouth daily    1.  Yearly exam-Pap smear done with reflex HPV at patient request, self breast awareness reviewed, calcium/vitamin D recommendations discussed, mammogram request not given with follow-up to be determined pending upcoming breast biopsy results.  2. history of left breast fullness/breast calcifications/right breast cyst-was seen 3/8/24 with breast fullness.  Had bilateral diagnostic mammogram and left ultrasound at that time.  Has continued with follow-up diagnostic imaging and most recently had diagnostic mammogram and right breast ultrasound on 2/28/2025 with 2 areas of calcifications in the left breast at 4:00 and 6:00 for which biopsy was recommended and is scheduled 4/21/2025.  Right breast had multiple small cysts without suspicion and no intervention was recommended for these.  She will proceed with biopsy as noted and then we will proceed accordingly.  Breast exam was with fibrocystic changes bilaterally without focal findings.  3. STD-negative GC/chlamydia testing noted from 2/6/2024.  She denies need for testing at this time.  4. herpes-continues to have outbreaks about 3 noted since August 2024.  This is despite using Valtrex 1000 mg daily.  She does note that she will forget the pill from time to time.  Would suggest more regular use of Valtrex.  She will continue with 1000 mg dosing, #90 refill 3 sent to local pharmacy.  We did discuss Famvir and we could consider this going forward if more frequent outbreaks are noted.  5. history of menorrhagia-status post endometrial ablation with Carlita April 2019.  She noted rare spotting about 5 months ago and recently.  She is very happy.  6. contraception-tubal ligation  7.  History of  "cervical dysplasia-status post LEEP 2017 for LEN-2 to 3.  Pap with HPV was negative from 11/2/2023.  Patient requested Pap, done today with reflex HPV with disposition as per findings.  8.  Prior history UTI-denies complaints   9.  Family history of breast cancer-paternal grandmother.  To proceed with breast biopsy as above.  10.  Other-children are ages 24, 20, and 17.  She has a 2-year-old grandson who lives at her house as well.  Her house is busy.  11.  Intermittent perimenopausal symptoms-precautions reviewed.  Follow-up 1 year for yearly exam or as needed.        Subjective   Patient ID: Shanice Hair is a 44 y.o. female.    Vitals:    03/27/25 0821   BP: 108/66     Patient was seen today for yearly exam.  Please see assessment plan for details.        The following portions of the patient's history were reviewed and updated as appropriate: allergies, current medications, past family history, past medical history, past social history, past surgical history, and problem list.  Past Medical History:   Diagnosis Date    Abnormal Pap smear of cervix     Acne     Anxiety     ASCUS with positive high risk HPV cervical     Resolved: 10/23/2017    BRBPR (bright red blood per rectum)     Cancer (HCC)     pre-cancerous, cervical     Cellulitis of right foot 2017    LEN III (cervical intraepithelial neoplasia grade III) with severe dysplasia 09/06/2017    Colon polyp     Constipation     \"significant\"    Diverticulosis     External hemorrhoids 11/15/2018    Family hx of colon cancer     Paternal uncle    GERD (gastroesophageal reflux disease)     Herpes     Hirsutism     HPV (human papilloma virus) infection     Hx of bleeding disorder     DUB    Psychiatric disorder     anxiety    S/P endometrial ablation 04/23/2019    Urinary tract infection      Past Surgical History:   Procedure Laterality Date    COLONOSCOPY      COLPOSCOPY      OVARIAN CYST REMOVAL      NM COLONOSCOPY FLX DX W/COLLJ SPEC WHEN PFRMD N/A 01/22/2019 "    Procedure: COLONOSCOPY with polypectomy;  Surgeon: Jane Warren MD;  Location: [facility];  Service: General    OK HYSTEROSCOPY DIAGNOSTIC SEPARATE PROCEDURE N/A 2019    Procedure: HYSTEROSCOPY;  Surgeon: Hermes Solano MD;  Location: [facility];  Service: Gynecology    OK HYSTEROSCOPY ENDOMETRIAL ABLATION N/A 2019    Procedure: ABLATION ENDOMETRIAL;  Surgeon: Hermes Solano MD;  Location: [facility];  Service: Gynecology    TUBAL LIGATION      WISDOM TOOTH EXTRACTION       OB History    Para Term  AB Living   3 3 3   3   SAB IAB Ectopic Multiple Live Births       3      # Outcome Date GA Lbr Isai/2nd Weight Sex Type Anes PTL Lv   3 Term            2 Term            1 Term                Current Outpatient Medications:     docusate sodium (COLACE) 100 mg capsule, TAKE 1 CAPSULE BY MOUTH TWICE A DAY, Disp: 30 capsule, Rfl: 0    famotidine (PEPCID) 40 MG tablet, TAKE 1 TABLET BY MOUTH EVERY DAY, Disp: 90 tablet, Rfl: 1    hydrocortisone (WESTCORT) 0.2 % cream, Apply topically 2 (two) times a day, Disp: 45 g, Rfl: 0    Multiple Vitamins-Minerals (MULTIVITAMIN GUMMIES ADULT PO), Take 2 tablets by mouth daily, Disp: , Rfl:     naproxen (NAPROSYN) 500 mg tablet, 1 TABLET TWICE DAILY FOR 10 DAYS THEN AS NEEDED, Disp: 60 tablet, Rfl: 0    norethindrone (Jencycla) 0.35 MG tablet, TAKE 1 TABLET BY MOUTH EVERY DAY, Disp: 28 tablet, Rfl: 0    valACYclovir (VALTREX) 1,000 mg tablet, Take 1 tablet (1,000 mg total) by mouth daily, Disp: 90 tablet, Rfl: 3    benzonatate (TESSALON PERLES) 100 mg capsule, Take 1 capsule (100 mg total) by mouth 3 (three) times a day as needed for cough (Patient not taking: Reported on 3/27/2025), Disp: 20 capsule, Rfl: 0    fluticasone (FLONASE) 50 mcg/act nasal spray, 1 spray into each nostril daily (Patient not taking: Reported on 3/27/2025), Disp: 9.9 mL, Rfl: 0    polymyxin b-trimethoprim (POLYTRIM) ophthalmic solution, Administer 1 drop into the left eye every 4  (four) hours (Patient not taking: Reported on 3/27/2025), Disp: 10 mL, Rfl: 0  Allergies   Allergen Reactions    Cephalosporins Hives     Social History     Socioeconomic History    Marital status:      Spouse name: None    Number of children: 3    Years of education: None    Highest education level: None   Occupational History    None   Tobacco Use    Smoking status: Never    Smokeless tobacco: Never   Vaping Use    Vaping status: Never Used   Substance and Sexual Activity    Alcohol use: Yes     Comment: once every few months    Drug use: No    Sexual activity: Yes     Partners: Male     Birth control/protection: OCP   Other Topics Concern    None   Social History Narrative    Caffeine Use    Uses Safety Equipment-seatbelts     Social Drivers of Health     Financial Resource Strain: Low Risk  (11/8/2023)    Overall Financial Resource Strain (CARDIA)     Difficulty of Paying Living Expenses: Not very hard   Food Insecurity: Not on file   Transportation Needs: No Transportation Needs (11/8/2023)    PRAPARE - Transportation     Lack of Transportation (Medical): No     Lack of Transportation (Non-Medical): No   Physical Activity: Not on file   Stress: Not on file   Social Connections: Not on file   Intimate Partner Violence: Not on file   Housing Stability: Not on file     Family History   Problem Relation Age of Onset    Hyperlipidemia Mother     Hypertension Mother     Diabetes Mother     Coronary artery disease Father     No Known Problems Daughter     No Known Problems Daughter     No Known Problems Daughter     Breast cancer Paternal Grandmother 80    Postmenopausal breast cancer Paternal Grandmother     Alzheimer's disease Paternal Grandfather     No Known Problems Paternal Aunt     No Known Problems Paternal Aunt     Colon cancer Paternal Uncle     Brain cancer Paternal Uncle     No Known Problems Half-Sister     Autoimmune disease Half-Sister     Spinal muscular atrophy Half-Sister     No Known  "Problems Half-Sister     No Known Problems Half-Sister     No Known Problems Half-Brother     Skin cancer Half-Brother        Review of Systems   Constitutional:  Negative for chills, diaphoresis, fatigue and fever.   Respiratory:  Negative for apnea, cough, chest tightness, shortness of breath and wheezing.    Cardiovascular:  Negative for chest pain, palpitations and leg swelling.   Gastrointestinal:  Negative for abdominal distention, abdominal pain, anal bleeding, constipation, diarrhea, nausea, rectal pain and vomiting.   Genitourinary:  Negative for difficulty urinating, dyspareunia, dysuria, frequency, hematuria, menstrual problem, pelvic pain, urgency, vaginal bleeding, vaginal discharge and vaginal pain.   Musculoskeletal:  Negative for arthralgias, back pain and myalgias.   Skin:  Negative for color change and rash.   Neurological:  Negative for dizziness, syncope, light-headedness, numbness and headaches.   Hematological:  Negative for adenopathy. Does not bruise/bleed easily.   Psychiatric/Behavioral:  Negative for dysphoric mood and sleep disturbance. The patient is not nervous/anxious.        Objective   Physical Exam  OBGyn Exam     Objective      /66 (BP Location: Left arm, Patient Position: Sitting)   Ht 5' 3\" (1.6 m)   Wt 79 kg (174 lb 3.2 oz)   BMI 30.86 kg/m²     General:   alert and oriented, in no acute distress   Neck: normal to inspection and palpation   Breast: normal appearance, no masses or tenderness   Heart:    Lungs:    Abdomen: soft, non-tender, without masses or organomegaly   Vulva: normal   Vagina: Without erythema or lesions or discharge.  Normal   Cervix: Without lesions or discharge or cervicitis.  No Cervical motion tenderness cervix is well-healed.   Uterus: top normal size, anteverted, non-tender   Adnexa: no mass, fullness, tenderness   Rectum: negative    Psych:  Normal mood and affect   Skin:  Without obvious lesions   Eyes: symmetric, with normal movements and " reactivity   Musculoskeletal:  Normal muscle tone and movements appreciated

## 2025-03-29 DIAGNOSIS — K21.9 GASTROESOPHAGEAL REFLUX DISEASE WITHOUT ESOPHAGITIS: ICD-10-CM

## 2025-03-29 RX ORDER — FAMOTIDINE 40 MG/1
40 TABLET, FILM COATED ORAL DAILY
Qty: 90 TABLET | Refills: 1 | Status: SHIPPED | OUTPATIENT
Start: 2025-03-29

## 2025-03-31 LAB
LAB AP GYN PRIMARY INTERPRETATION: NORMAL
Lab: NORMAL

## 2025-04-01 ENCOUNTER — RESULTS FOLLOW-UP (OUTPATIENT)
Dept: GYNECOLOGY | Facility: CLINIC | Age: 45
End: 2025-04-01

## 2025-04-11 DIAGNOSIS — M54.41 ACUTE RIGHT-SIDED LOW BACK PAIN WITH RIGHT-SIDED SCIATICA: ICD-10-CM

## 2025-04-11 RX ORDER — NAPROXEN 500 MG/1
TABLET ORAL
Qty: 60 TABLET | Refills: 5 | Status: SHIPPED | OUTPATIENT
Start: 2025-04-11

## 2025-04-21 ENCOUNTER — HOSPITAL ENCOUNTER (OUTPATIENT)
Dept: MAMMOGRAPHY | Facility: CLINIC | Age: 45
Discharge: HOME | End: 2025-04-21
Payer: COMMERCIAL

## 2025-04-21 VITALS — SYSTOLIC BLOOD PRESSURE: 127 MMHG | DIASTOLIC BLOOD PRESSURE: 79 MMHG | HEART RATE: 76 BPM

## 2025-04-21 DIAGNOSIS — R92.8 ABNORMAL MAMMOGRAM: ICD-10-CM

## 2025-04-21 PROCEDURE — 19081 BX BREAST 1ST LESION STRTCTC: CPT

## 2025-04-21 PROCEDURE — 88342 IMHCHEM/IMCYTCHM 1ST ANTB: CPT | Performed by: PATHOLOGY

## 2025-04-21 PROCEDURE — 88305 TISSUE EXAM BY PATHOLOGIST: CPT | Performed by: PATHOLOGY

## 2025-04-21 PROCEDURE — 88341 IMHCHEM/IMCYTCHM EA ADD ANTB: CPT | Performed by: PATHOLOGY

## 2025-04-21 PROCEDURE — A4648 IMPLANTABLE TISSUE MARKER: HCPCS

## 2025-04-21 PROCEDURE — 19082 BX BREAST ADD LESION STRTCTC: CPT

## 2025-04-21 RX ORDER — LIDOCAINE HYDROCHLORIDE AND EPINEPHRINE BITARTRATE 20; .01 MG/ML; MG/ML
10 INJECTION, SOLUTION SUBCUTANEOUS ONCE
Status: COMPLETED | OUTPATIENT
Start: 2025-04-21 | End: 2025-04-21

## 2025-04-21 RX ORDER — LIDOCAINE HYDROCHLORIDE 10 MG/ML
5 INJECTION, SOLUTION EPIDURAL; INFILTRATION; INTRACAUDAL; PERINEURAL ONCE
Status: COMPLETED | OUTPATIENT
Start: 2025-04-21 | End: 2025-04-21

## 2025-04-21 RX ADMIN — LIDOCAINE HYDROCHLORIDE,EPINEPHRINE BITARTRATE 10 ML: 20; .01 INJECTION, SOLUTION INFILTRATION; PERINEURAL at 13:10

## 2025-04-21 RX ADMIN — LIDOCAINE HYDROCHLORIDE 5 ML: 10 INJECTION, SOLUTION EPIDURAL; INFILTRATION; INTRACAUDAL; PERINEURAL at 12:38

## 2025-04-21 RX ADMIN — LIDOCAINE HYDROCHLORIDE,EPINEPHRINE BITARTRATE 10 ML: 20; .01 INJECTION, SOLUTION INFILTRATION; PERINEURAL at 12:38

## 2025-04-21 RX ADMIN — LIDOCAINE HYDROCHLORIDE 5 ML: 10 INJECTION, SOLUTION EPIDURAL; INFILTRATION; INTRACAUDAL; PERINEURAL at 13:10

## 2025-04-21 NOTE — LETTER
[facility]  [address] [facility] [address]  Dept: [phone]    April 21, 2025     Patient: Shanice Hair   YOB: 1980   Date of Visit: 4/21/2025       To Whom it May Concern:    Shanice Hair is under our professional care. She was seen in the hospital on 4/21/2025. She may return back to work on 4/23/2025 without restrictions.    If you have any questions or concerns, please don't hesitate to call.         Sincerely,          KYLE ROSS MA

## 2025-04-23 ENCOUNTER — TELEPHONE (OUTPATIENT)
Dept: MAMMOGRAPHY | Facility: CLINIC | Age: 45
End: 2025-04-23

## 2025-04-23 PROCEDURE — 88305 TISSUE EXAM BY PATHOLOGIST: CPT | Performed by: PATHOLOGY

## 2025-04-23 PROCEDURE — 88342 IMHCHEM/IMCYTCHM 1ST ANTB: CPT | Performed by: PATHOLOGY

## 2025-04-23 PROCEDURE — 88341 IMHCHEM/IMCYTCHM EA ADD ANTB: CPT | Performed by: PATHOLOGY

## 2025-04-28 NOTE — PROGRESS NOTES
Ice pack given:    __X___yes _____no    Discharge instructions reviewed and given to patient:    __X___yes _____no    Discharged via:    __X___amulatory    _____wheelchair    _____stretcher    Biopsy site clean and dry with no bleeding on discharge:    __X___yes ____no  
Met with patient and Dr. Collado regarding recommendation for;    _____ RIGHT ___X___LEFT      _____Ultrasound guided  ___X___Stereotactic breast biopsy x2      __X___Verbalized understanding.    Reviewed clip placement with patient, pt states understanding: Yes: __X__ No: ____  Comments:    Blood thinners:  No: __X___ Yes: ______ What:          Biopsy teaching sheet given:  Yes: ___X___ No: ________    Pt given contact information and adv to call with any questions/needs    Patient advised to arrive at 1200 for a 1230 appointment  
Patient arrived via:    __X___ambulatory    _____wheelchair    _____stretcher      Domestic violence screen    ___X___negative______positive    Breast Implants:    _______yes ____X____no  
Patient called into the [facility] on the morning of 4/28/25 to report she has a hematoma that appears to be increasing in size with tenderness to the left breast. Offered for patient to come into the [facility] for a site check. Patient arrived around 9 am, left breast with ecchymosis noted to lower breast. Hematoma felt in the left lower breast and inner area. Steri strips still intact. Breasts appear symmetrical bilaterally, no drainage, redness or edema noted. Left breast soft. Advised patient to perform warm compresses to the left breast several times a day and to wet steri stips and carefully remove them as they do not need to be on any longer. Educated patient that a hematoma can take several weeks to go away. Advised patient to monitor and to call for any further concerns or issues. Patient has my name/#.   
Post procedure call completed    Bleeding: _____yes __X___no (pt denies)    Pain: _____yes ___X___no (pt reports some soreness, took Tylenol last night, none taken yet today)    Redness/Swelling: ______yes ___X___no (pt reports some redness at the biopsy sites. Denies fever, chills or drainage. Still has bandaids on, encouraged to monitor and to call for any signs of infections which were reviewed or any concerns)    Band aid removed: _____yes ___X__no (discussed removing when she showers)    Steri-Strips intact: ___X___yes _____no (discussed with patient to remove steri strips on Saturday if they have not come off on their own)    Pt with no questions at this time, adv will call when results available, adv to call with any questions or concerns, has name/# for contact  
Procedure type: Site # 1    _____ultrasound guided __x___stereotactic    Breast:    ___x__Left _____Right    Location: 4 o'clock    Needle: 10G    # of passes:6 cores Total ( 4 cores with calcs and 2 cores without calcs)    Clip: [place] Tie      Procedure type: Site # 2    _____ultrasound guided ___x__stereotactic    Breast:    __x___Left _____Right    Location: 6 o'clock    Needle: 10g    # of passes:6 cores Total (3 cores with calcs and 3 cores without calcs)    Clip: U Shape      Performed by: Dr. White    Pressure held for 7 minutes on each site by: Marlin Mathews Strips:    ___X__yes _____no    Band aid:( pressure dressing applied with 4x4 guaze and paper tape)    __X___yes_____no    Tolerated procedure:    __X___yes _____no   
Yes

## 2025-07-15 ENCOUNTER — OFFICE VISIT (OUTPATIENT)
Dept: FAMILY MEDICINE CLINIC | Facility: CLINIC | Age: 45
End: 2025-07-15
Payer: COMMERCIAL

## 2025-07-15 VITALS
DIASTOLIC BLOOD PRESSURE: 86 MMHG | TEMPERATURE: 98.9 F | BODY MASS INDEX: 30.3 KG/M2 | WEIGHT: 171 LBS | HEART RATE: 91 BPM | OXYGEN SATURATION: 97 % | SYSTOLIC BLOOD PRESSURE: 128 MMHG | HEIGHT: 63 IN

## 2025-07-15 DIAGNOSIS — K21.9 GASTROESOPHAGEAL REFLUX DISEASE WITHOUT ESOPHAGITIS: ICD-10-CM

## 2025-07-15 DIAGNOSIS — E66.9 OBESITY (BMI 30-39.9): ICD-10-CM

## 2025-07-15 DIAGNOSIS — R05.1 ACUTE COUGH: ICD-10-CM

## 2025-07-15 DIAGNOSIS — J02.9 SORE THROAT: Primary | ICD-10-CM

## 2025-07-15 LAB
S PYO AG THROAT QL: NEGATIVE
SARS-COV-2 AG UPPER RESP QL IA: NEGATIVE
VALID CONTROL: NORMAL

## 2025-07-15 PROCEDURE — 87811 SARS-COV-2 COVID19 W/OPTIC: CPT | Performed by: FAMILY MEDICINE

## 2025-07-15 PROCEDURE — 87880 STREP A ASSAY W/OPTIC: CPT | Performed by: FAMILY MEDICINE

## 2025-07-15 PROCEDURE — 99214 OFFICE O/P EST MOD 30 MIN: CPT | Performed by: FAMILY MEDICINE

## 2025-07-15 RX ORDER — AZITHROMYCIN 250 MG/1
TABLET, FILM COATED ORAL
Qty: 6 TABLET | Refills: 0 | Status: SHIPPED | OUTPATIENT
Start: 2025-07-15 | End: 2025-07-20

## 2025-07-28 ENCOUNTER — PATIENT MESSAGE (OUTPATIENT)
Dept: GYNECOLOGY | Facility: CLINIC | Age: 45
End: 2025-07-28

## 2025-07-28 DIAGNOSIS — Z12.31 ENCOUNTER FOR SCREENING MAMMOGRAM FOR MALIGNANT NEOPLASM OF BREAST: Primary | ICD-10-CM

## 2025-07-28 DIAGNOSIS — R92.30 DENSE BREAST TISSUE ON MAMMOGRAM, UNSPECIFIED TYPE: ICD-10-CM

## 2025-08-19 ENCOUNTER — NURSE TRIAGE (OUTPATIENT)
Age: 45
End: 2025-08-19

## 2025-08-19 ENCOUNTER — OFFICE VISIT (OUTPATIENT)
Dept: URGENT CARE | Facility: MEDICAL CENTER | Age: 45
End: 2025-08-19
Payer: COMMERCIAL

## 2025-08-19 VITALS
RESPIRATION RATE: 18 BRPM | HEART RATE: 83 BPM | DIASTOLIC BLOOD PRESSURE: 86 MMHG | TEMPERATURE: 99.1 F | OXYGEN SATURATION: 99 % | SYSTOLIC BLOOD PRESSURE: 138 MMHG

## 2025-08-19 DIAGNOSIS — J06.9 ACUTE URI: Primary | ICD-10-CM

## 2025-08-19 PROCEDURE — S9083 URGENT CARE CENTER GLOBAL: HCPCS

## 2025-08-19 PROCEDURE — G0382 LEV 3 HOSP TYPE B ED VISIT: HCPCS

## 2025-08-19 RX ORDER — BUTALB/ACETAMINOPHEN/CAFFEINE 50-325-40
1 TABLET ORAL 2 TIMES DAILY
COMMUNITY

## 2025-08-21 ENCOUNTER — OFFICE VISIT (OUTPATIENT)
Dept: FAMILY MEDICINE CLINIC | Facility: CLINIC | Age: 45
End: 2025-08-21
Payer: COMMERCIAL

## 2025-08-21 VITALS
HEIGHT: 63 IN | WEIGHT: 169 LBS | BODY MASS INDEX: 29.95 KG/M2 | HEART RATE: 76 BPM | OXYGEN SATURATION: 99 % | DIASTOLIC BLOOD PRESSURE: 100 MMHG | SYSTOLIC BLOOD PRESSURE: 130 MMHG

## 2025-08-21 DIAGNOSIS — J20.8 ACUTE BRONCHITIS DUE TO OTHER SPECIFIED ORGANISMS: ICD-10-CM

## 2025-08-21 DIAGNOSIS — R05.9 COUGH, UNSPECIFIED TYPE: Primary | ICD-10-CM

## 2025-08-21 LAB
SARS-COV-2 AG UPPER RESP QL IA: NEGATIVE
SL AMB POCT RAPID FLU A: NEGATIVE
SL AMB POCT RAPID FLU B: NEGATIVE
VALID CONTROL: NORMAL

## 2025-08-21 PROCEDURE — 87811 SARS-COV-2 COVID19 W/OPTIC: CPT | Performed by: FAMILY MEDICINE

## 2025-08-21 PROCEDURE — 87804 INFLUENZA ASSAY W/OPTIC: CPT | Performed by: FAMILY MEDICINE

## 2025-08-21 PROCEDURE — 99213 OFFICE O/P EST LOW 20 MIN: CPT | Performed by: FAMILY MEDICINE

## 2025-08-21 RX ORDER — AZITHROMYCIN 250 MG/1
TABLET, FILM COATED ORAL
Qty: 6 TABLET | Refills: 0 | Status: SHIPPED | OUTPATIENT
Start: 2025-08-21 | End: 2025-08-25

## 2025-08-21 RX ORDER — METHYLPREDNISOLONE 4 MG/1
TABLET ORAL
Qty: 21 EACH | Refills: 0 | Status: SHIPPED | OUTPATIENT
Start: 2025-08-21

## (undated) DEVICE — CYSTO TUBING SINGLE IRRIGATION

## (undated) DEVICE — SINGLE-USE BIOPSY FORCEPS: Brand: RADIAL JAW 4

## (undated) DEVICE — SCD SEQUENTIAL COMPRESSION COMFORT SLEEVE MEDIUM KNEE LENGTH: Brand: KENDALL SCD

## (undated) DEVICE — 2000CC GUARDIAN II: Brand: GUARDIAN

## (undated) DEVICE — GLOVE PI ULTRA TOUCH SZ.7.0

## (undated) DEVICE — STERILE MINERVA DISPOSABLE HANDPIECECONTENTS:(1) ONE SINGLE USE STERILE MINERVA ES DISPOSABLE HANDPIECE (1) ONE SINGLE USE STERILE SYRINGE(1) ONE SINGLE USE STERILE 8MM HEGAR DILATOR(1) ONE SINGLE USE NON-STERILE DESICCANT(1) ONE NON-STERILE HANDPIECE INSTRUCTIONS FOR USE(1)  ONE NON-STERILE DILATOR INSTRUCTIONS FOR USE: Brand: MINERVA SINGLE STERILE DISPOSABLE HANDPIECE

## (undated) DEVICE — GLOVE PI ULTRA TOUCH SZ.7.5

## (undated) DEVICE — GLOVE PI ULTRA TOUCH SZ.6.5

## (undated) DEVICE — SPONGE 4 X 4 XRAY 16 PLY STRL LF RFD

## (undated) DEVICE — STRL ALLENTOWN HYSTEROSCOPY PK: Brand: CARDINAL HEALTH

## (undated) DEVICE — GLOVE INDICATOR PI UNDERGLOVE SZ 7 BLUE

## (undated) DEVICE — IV EXTENSION TUBING 33 IN

## (undated) DEVICE — PREMIUM DRY TRAY LF: Brand: MEDLINE INDUSTRIES, INC.

## (undated) DEVICE — PVC URETHRAL CATHETER: Brand: DOVER

## (undated) DEVICE — GLOVE INDICATOR PI UNDERGLOVE SZ 7.5 BLUE